# Patient Record
Sex: FEMALE | Race: WHITE | NOT HISPANIC OR LATINO | Employment: FULL TIME | URBAN - METROPOLITAN AREA
[De-identification: names, ages, dates, MRNs, and addresses within clinical notes are randomized per-mention and may not be internally consistent; named-entity substitution may affect disease eponyms.]

---

## 2017-07-18 ENCOUNTER — OFFICE VISIT - HEALTHEAST (OUTPATIENT)
Dept: FAMILY MEDICINE | Facility: CLINIC | Age: 37
End: 2017-07-18

## 2017-07-18 DIAGNOSIS — N20.1 URETERAL STONE: ICD-10-CM

## 2017-07-18 ASSESSMENT — MIFFLIN-ST. JEOR: SCORE: 1534.04

## 2017-08-09 ENCOUNTER — RECORDS - HEALTHEAST (OUTPATIENT)
Dept: ADMINISTRATIVE | Facility: OTHER | Age: 37
End: 2017-08-09

## 2017-09-05 ENCOUNTER — RECORDS - HEALTHEAST (OUTPATIENT)
Dept: ADMINISTRATIVE | Facility: OTHER | Age: 37
End: 2017-09-05

## 2017-11-06 ENCOUNTER — RECORDS - HEALTHEAST (OUTPATIENT)
Dept: ADMINISTRATIVE | Facility: OTHER | Age: 37
End: 2017-11-06

## 2018-10-03 ENCOUNTER — OFFICE VISIT - HEALTHEAST (OUTPATIENT)
Dept: FAMILY MEDICINE | Facility: CLINIC | Age: 38
End: 2018-10-03

## 2018-10-03 DIAGNOSIS — Z00.00 ROUTINE MEDICAL EXAM: ICD-10-CM

## 2018-10-03 LAB
HCG UR QL: NEGATIVE
SP GR UR STRIP: 1.01 (ref 1–1.03)

## 2018-10-03 ASSESSMENT — MIFFLIN-ST. JEOR: SCORE: 1522.71

## 2018-10-04 LAB
HPV SOURCE: NORMAL
HUMAN PAPILLOMA VIRUS 16 DNA: NEGATIVE
HUMAN PAPILLOMA VIRUS 18 DNA: NEGATIVE
HUMAN PAPILLOMA VIRUS FINAL DIAGNOSIS: NORMAL
HUMAN PAPILLOMA VIRUS OTHER HR: NEGATIVE
SPECIMEN DESCRIPTION: NORMAL

## 2018-10-11 LAB
BKR LAB AP ABNORMAL BLEEDING: YES
BKR LAB AP BIRTH CONTROL/HORMONES: NORMAL
BKR LAB AP CERVICAL APPEARANCE: NORMAL
BKR LAB AP GYN ADEQUACY: NORMAL
BKR LAB AP GYN INTERPRETATION: NORMAL
BKR LAB AP HPV REFLEX: NORMAL
BKR LAB AP LMP: NORMAL
BKR LAB AP PATIENT STATUS: NORMAL
BKR LAB AP PREVIOUS ABNORMAL: NORMAL
BKR LAB AP PREVIOUS NORMAL: NORMAL
HIGH RISK?: YES
PATH REPORT.COMMENTS IMP SPEC: NORMAL
RESULT FLAG (HE HISTORICAL CONVERSION): NORMAL

## 2020-10-13 ENCOUNTER — OFFICE VISIT (OUTPATIENT)
Dept: INTERNAL MEDICINE | Facility: CLINIC | Age: 40
End: 2020-10-13
Payer: COMMERCIAL

## 2020-10-13 ENCOUNTER — ANCILLARY PROCEDURE (OUTPATIENT)
Dept: GENERAL RADIOLOGY | Facility: CLINIC | Age: 40
End: 2020-10-13
Attending: NURSE PRACTITIONER
Payer: COMMERCIAL

## 2020-10-13 VITALS
OXYGEN SATURATION: 98 % | SYSTOLIC BLOOD PRESSURE: 130 MMHG | DIASTOLIC BLOOD PRESSURE: 80 MMHG | WEIGHT: 203.9 LBS | BODY MASS INDEX: 29.68 KG/M2 | TEMPERATURE: 98.4 F | HEART RATE: 101 BPM

## 2020-10-13 DIAGNOSIS — G89.29 CHRONIC RIGHT SHOULDER PAIN: Primary | ICD-10-CM

## 2020-10-13 DIAGNOSIS — G89.29 CHRONIC RIGHT SHOULDER PAIN: ICD-10-CM

## 2020-10-13 DIAGNOSIS — M25.511 CHRONIC RIGHT SHOULDER PAIN: Primary | ICD-10-CM

## 2020-10-13 DIAGNOSIS — K21.9 GASTROESOPHAGEAL REFLUX DISEASE WITHOUT ESOPHAGITIS: ICD-10-CM

## 2020-10-13 DIAGNOSIS — M25.511 CHRONIC RIGHT SHOULDER PAIN: ICD-10-CM

## 2020-10-13 PROCEDURE — 73030 X-RAY EXAM OF SHOULDER: CPT | Mod: RT | Performed by: RADIOLOGY

## 2020-10-13 PROCEDURE — 99204 OFFICE O/P NEW MOD 45 MIN: CPT | Performed by: NURSE PRACTITIONER

## 2020-10-13 RX ORDER — CYCLOBENZAPRINE HCL 10 MG
10 TABLET ORAL 2 TIMES DAILY PRN
Qty: 30 TABLET | Refills: 0 | Status: SHIPPED | OUTPATIENT
Start: 2020-10-13 | End: 2022-02-07

## 2020-10-13 RX ORDER — LANSOPRAZOLE 30 MG/1
30 CAPSULE, DELAYED RELEASE ORAL DAILY
Qty: 30 CAPSULE | Refills: 1 | Status: SHIPPED | OUTPATIENT
Start: 2020-10-13 | End: 2020-12-21

## 2020-10-13 NOTE — PROGRESS NOTES
Subjective     Anahi Paul is a 40 year old female who presents to clinic today for the following health issues:    HPI         Musculoskeletal problem/pain  Onset/Duration: Started six months ago on and off, it has gotten worse the last month or two.  Description  Location: Shoulder - right  Joint Swelling: no  Redness: no  Pain: YES  Warmth: no  Intensity:  mild, 3/10 when sitting   Progression of Symptoms:  same and depend on the day  Accompanying signs and symptoms:   Fevers: no  Numbness/tingling/weakness: YES  History  Trauma to the area: YES  Recent illness:  YES  Previous similar problem: YES  Previous evaluation:  no  Precipitating or alleviating factors:  Aggravating factors include: lifting, exercise, walking, and overuse  Therapies tried and outcome: heat, ice, immobilization and support wrap, no improvement      See above.  New patient to the clinic and provider. Reviewed PMH, medications, etc.    Biggest concern is right shoulder pain for 6 months and not getting any better.  No recent fall or injury. Taking Ibuprofen from time to time without relief.  Difficulty brushing hair and putting on bra. Unable to sleep on that shoulder.  No x-ray or PT in past.  Asking for Rx for something different than OTC Prilosec (it does not work well).    Review of Systems   Constitutional, HEENT, cardiovascular, pulmonary, gi and gu systems are negative, except as otherwise noted.      Objective    /80 (BP Location: Right arm, Patient Position: Sitting, Cuff Size: Adult Large)   Pulse 101   Temp 98.4  F (36.9  C) (Oral)   Wt 92.5 kg (203 lb 14.4 oz)   LMP 10/08/2020 (Exact Date)   SpO2 98%   BMI 29.68 kg/m    Body mass index is 29.68 kg/m .     Physical Exam   GENERAL: alert and no distress  RESP: lungs clear to auscultation - no rales, rhonchi or wheezes  CV: regular rate and rhythm, normal S1 S2, no S3 or S4, no murmur, click or rub, no peripheral edema and peripheral pulses strong  ABDOMEN: soft,  nontender, no hepatosplenomegaly, no masses and bowel sounds normal  MS/Shoulder: left shoulder with good ROM while right has very limited ROM in all rotator cuff muscles; no AC shoulder joint tenderness but mostly tenderness along Trapezius muscle and down mid back  SKIN: no suspicious lesions or rashes        Assessment & Plan     Chronic right shoulder pain  - XR Shoulder Right 2 Views; Future  - cyclobenzaprine (FLEXERIL) 10 MG tablet; Take 1 tablet (10 mg) by mouth 2 times daily as needed for muscle spasms; take mostly at night to help with muscle pain and sleep  - PHYSICAL THERAPY REFERRAL; Future  - if no resolve, will consider MRI and referral to Orthopedics    Gastroesophageal reflux disease without esophagitis  - in place of Omeprazole (does not work) will trial):    LANsoprazole (PREVACID) 30 MG DR capsule; Take 1 capsule (30 mg) by mouth daily        Patient Instructions   Go to radiology for shoulder x-ray    Will treat shoulder muscle pain with Flexeril 10 mg q hs as needed for sleep and pain    Suggested over the counter Salonpas patch and/or thera gel topical analgesics.    Refill on Prevacid 30 mg daily    Referral to PT    Follow up for physical in 1-2 months and for fasting labs.    Declines flu shot at this time.    Return in about 6 weeks (around 11/24/2020) for Routine preventive, with me, in person; with fasting labs.    WILMER Wilson [facility]

## 2020-10-13 NOTE — PATIENT INSTRUCTIONS
Go to radiology for shoulder x-ray    Will treat shoulder muscle pain with Flexeril 10 mg q hs as needed for sleep and pain    Suggested over the counter Salonpas patch and/or thera gel topical analgesics.    Refill on Prevacid 30 mg daily    Referral to PT    Follow up for physical in 1-2 months and for fasting labs.

## 2020-10-14 ENCOUNTER — NURSE TRIAGE (OUTPATIENT)
Dept: NURSING | Facility: CLINIC | Age: 40
End: 2020-10-14

## 2020-10-15 NOTE — TELEPHONE ENCOUNTER
Pt calling in saying she missed a call from ? Clinic    Wants to know if I can see her shoulder x-ray results    Looking in chart I see >  XR SHOULDER 2 VIEW RIGHT  10/13/2020 3:50 PM     IMPRESSION: No acute fracture or malalignment. There is normal  glenohumeral joint spacing. Mild acromioclavicular joint degenerative  Changes.    The above information given to pt   As well advised to call clinic when open tomorrow and discuss    Protocol and care advice reviewed  Caller states understanding of the recommended disposition  Advised to call back if further questions or concerns    Taco Virgen , RN / [facility]    Reason for Disposition    Caller requesting lab results    Additional Information    Lab result questions    Protocols used: PCP CALL - NO TRIAGE-A-AH, INFORMATION ONLY CALL-A-AH

## 2020-11-07 ENCOUNTER — THERAPY VISIT (OUTPATIENT)
Dept: PHYSICAL THERAPY | Facility: CLINIC | Age: 40
End: 2020-11-07
Attending: NURSE PRACTITIONER
Payer: COMMERCIAL

## 2020-11-07 DIAGNOSIS — M25.511 CHRONIC RIGHT SHOULDER PAIN: ICD-10-CM

## 2020-11-07 DIAGNOSIS — G89.29 CHRONIC RIGHT SHOULDER PAIN: ICD-10-CM

## 2020-11-07 PROCEDURE — 97110 THERAPEUTIC EXERCISES: CPT | Mod: GP | Performed by: PHYSICAL THERAPIST

## 2020-11-07 PROCEDURE — 97161 PT EVAL LOW COMPLEX 20 MIN: CPT | Mod: GP | Performed by: PHYSICAL THERAPIST

## 2020-11-07 NOTE — PROGRESS NOTES
[facility] Initial Evaluation  Subjective:  The history is provided by the patient. No  was used.   Patient Health History  Anahi Paul being seen for PT for shoulder.     Problem began: 7/1/2020.   Problem occurred: Unknown   Pain is reported as 3/10 and 4/10 on pain scale.  General health as reported by patient is good.  Pertinent medical history includes: none.     Medical allergies: other. Other medical allergies details: See med list.   Surgeries include:  Other. Other surgery history details: Oral, congenital hip, cryro,.    Current medications:  Muscle relaxants and pain medication.    Current occupation is Student, , and lead.   Primary job tasks include:  Computer work, lifting/carrying, prolonged standing, pushing/pulling and repetitive tasks.                  Therapist Generated HPI Evaluation         Type of problem:  Right shoulder.    This is a chronic condition.        Pain is described as aching and sharp and is intermittent.  Pain is worse in the P.M..  Since onset symptoms are unchanged.  Associated symptoms:  Loss of motion/stiffness and loss of strength. Symptoms are exacerbated by lifting, carrying, using arm at shoulder level, using arm behind back, lying on extremity and using arm overhead (dressing upper body)  and relieved by ice, rest and analgesics.  Special tests included:  X-ray.  Previous treatment includes chiropractic (7-8 years ago). There was significant improvement following previous treatment.  Restrictions due to condition include:  Working in normal job without restrictions.      Therapist Generated HPI Evaluation         Type of problem:  Right shoulder.    This is a chronic condition.  Condition occurred with:  Unknown cause.  Where condition occurred: for unknown reasons.  Patient reports pain:  Anterior and posterior.  Pain is described as sharp and is intermittent.  Radiates to: biceps, pectoralis muscle. Pain is  worse in the P.M..  Since onset symptoms are gradually worsening.  Associated symptoms:  Loss of motion/stiffness and loss of strength. Symptoms are exacerbated by carrying, lifting, lying on extremity, using arm behind back, using arm at shoulder level and using arm overhead (dressing)  and relieved by rest, ice and analgesics.  Special tests included:  X-ray (see report).  Previous treatment includes chiropractic (right shoulder 7 years ago). There was significant improvement following previous treatment.  Restrictions due to condition include:  Working in normal job without restrictions.  Barriers include:  None as reported by patient.                        Objective:  Standing Alignment:      Shoulder/upper extremity deviations alignment: forward shoulder posture.                                       Shoulder Evaluation:  ROM:  AROM:    Flexion:  Right:  90  Extension: Right: 40  Abduction:  Right:  98      External Rotation:  Right:  60                PROM:    Flexion:  Right: 102    Extension:  Right:  50  Abduction:  Right:  120    Internal Rotation:  Right:  70  External Rotation:  Right:  70                    Strength:    Flexion: Right: 4/5  Weak/painful     Pain:   Extension:  Right: 5/5    Pain:  Abduction:  Right: 4/5   Weak/painful    Pain:  Adduction:  Right: 5/5     Pain:  Internal Rotation:  Right: 5/5     Pain:  External Rotation:   Right:4/5   Weak/painful    Pain:              Special Tests:      Right shoulder positive for the following special tests:Impingement  Palpation:      Right shoulder tenderness present at: Biceps and Supraspinatus                                     General     ROS    Assessment/Plan:    Patient is a 40 year old female with right side shoulder complaints.    Patient has the following significant findings with corresponding treatment plan.                Diagnosis 1:  Right rotator cuff impingement  Pain -  hot/cold therapy, manual therapy, self management, education  and home program  Decreased ROM/flexibility - manual therapy, therapeutic exercise, therapeutic activity and home program  Decreased strength - therapeutic exercise, therapeutic activities and home program    Therapy Evaluation Codes:   1) History comprised of:   Personal factors that impact the plan of care:      None.    Comorbidity factors that impact the plan of care are:      Migraines/headaches, Osteoarthritis and Weakness.     Medications impacting care: Muscle relaxant and Pain.  2) Examination of Body Systems comprised of:   Body structures and functions that impact the plan of care:      Shoulder.   Activity limitations that impact the plan of care are:      Bathing, Dressing, Lifting, Sleeping and reaching.  3) Clinical presentation characteristics are:   Stable/Uncomplicated.  4) Decision-Making    Low complexity using standardized patient assessment instrument and/or measureable assessment of functional outcome.  Cumulative Therapy Evaluation is: Low complexity.    Previous and current functional limitations:  (See Goal Flow Sheet for this information)    Short term and Long term goals: (See Goal Flow Sheet for this information)     Communication ability:  Patient appears to be able to clearly communicate and understand verbal and written communication and follow directions correctly.  Treatment Explanation - The following has been discussed with the patient:   RX ordered/plan of care  Anticipated outcomes  Possible risks and side effects  This patient would benefit from PT intervention to resume normal activities.   Rehab potential is good.    Frequency:  1 X week, once daily  Duration:  for 6 weeks  Discharge Plan:  Achieve all LTG.  Independent in home treatment program.  Reach maximal therapeutic benefit.    Please refer to the daily flowsheet for treatment today, total treatment time and time spent performing 1:1 timed codes.

## 2020-11-14 ENCOUNTER — THERAPY VISIT (OUTPATIENT)
Dept: PHYSICAL THERAPY | Facility: CLINIC | Age: 40
End: 2020-11-14
Payer: COMMERCIAL

## 2020-11-14 DIAGNOSIS — G89.29 CHRONIC RIGHT SHOULDER PAIN: ICD-10-CM

## 2020-11-14 DIAGNOSIS — M25.511 CHRONIC RIGHT SHOULDER PAIN: ICD-10-CM

## 2020-11-14 PROCEDURE — 97010 HOT OR COLD PACKS THERAPY: CPT | Mod: GP | Performed by: PHYSICAL THERAPIST

## 2020-11-14 PROCEDURE — 97110 THERAPEUTIC EXERCISES: CPT | Mod: GP | Performed by: PHYSICAL THERAPIST

## 2020-11-21 ENCOUNTER — THERAPY VISIT (OUTPATIENT)
Dept: PHYSICAL THERAPY | Facility: CLINIC | Age: 40
End: 2020-11-21
Payer: COMMERCIAL

## 2020-11-21 DIAGNOSIS — G89.29 CHRONIC RIGHT SHOULDER PAIN: ICD-10-CM

## 2020-11-21 DIAGNOSIS — M25.511 CHRONIC RIGHT SHOULDER PAIN: ICD-10-CM

## 2020-11-21 PROCEDURE — 97140 MANUAL THERAPY 1/> REGIONS: CPT | Mod: GP | Performed by: PHYSICAL THERAPIST

## 2020-11-21 PROCEDURE — 97110 THERAPEUTIC EXERCISES: CPT | Mod: GP | Performed by: PHYSICAL THERAPIST

## 2020-12-04 ENCOUNTER — THERAPY VISIT (OUTPATIENT)
Dept: PHYSICAL THERAPY | Facility: CLINIC | Age: 40
End: 2020-12-04
Payer: COMMERCIAL

## 2020-12-04 DIAGNOSIS — G89.29 CHRONIC RIGHT SHOULDER PAIN: ICD-10-CM

## 2020-12-04 DIAGNOSIS — M25.511 CHRONIC RIGHT SHOULDER PAIN: ICD-10-CM

## 2020-12-04 PROCEDURE — 97112 NEUROMUSCULAR REEDUCATION: CPT | Mod: GP | Performed by: PHYSICAL THERAPY ASSISTANT

## 2020-12-04 PROCEDURE — 97140 MANUAL THERAPY 1/> REGIONS: CPT | Mod: GP | Performed by: PHYSICAL THERAPY ASSISTANT

## 2020-12-04 PROCEDURE — 97110 THERAPEUTIC EXERCISES: CPT | Mod: GP | Performed by: PHYSICAL THERAPY ASSISTANT

## 2020-12-11 ENCOUNTER — THERAPY VISIT (OUTPATIENT)
Dept: PHYSICAL THERAPY | Facility: CLINIC | Age: 40
End: 2020-12-11
Payer: COMMERCIAL

## 2020-12-11 DIAGNOSIS — G89.29 CHRONIC RIGHT SHOULDER PAIN: ICD-10-CM

## 2020-12-11 DIAGNOSIS — M25.511 CHRONIC RIGHT SHOULDER PAIN: ICD-10-CM

## 2020-12-11 PROCEDURE — 97110 THERAPEUTIC EXERCISES: CPT | Mod: GP | Performed by: PHYSICAL THERAPY ASSISTANT

## 2020-12-11 PROCEDURE — 97140 MANUAL THERAPY 1/> REGIONS: CPT | Mod: GP | Performed by: PHYSICAL THERAPY ASSISTANT

## 2020-12-17 ENCOUNTER — THERAPY VISIT (OUTPATIENT)
Dept: PHYSICAL THERAPY | Facility: CLINIC | Age: 40
End: 2020-12-17
Payer: COMMERCIAL

## 2020-12-17 DIAGNOSIS — G89.29 CHRONIC RIGHT SHOULDER PAIN: ICD-10-CM

## 2020-12-17 DIAGNOSIS — M25.511 CHRONIC RIGHT SHOULDER PAIN: ICD-10-CM

## 2020-12-17 PROCEDURE — 97110 THERAPEUTIC EXERCISES: CPT | Mod: GP | Performed by: PHYSICAL THERAPY ASSISTANT

## 2020-12-17 PROCEDURE — 97112 NEUROMUSCULAR REEDUCATION: CPT | Mod: GP | Performed by: PHYSICAL THERAPY ASSISTANT

## 2020-12-18 DIAGNOSIS — K21.9 GASTROESOPHAGEAL REFLUX DISEASE WITHOUT ESOPHAGITIS: ICD-10-CM

## 2020-12-20 ENCOUNTER — HEALTH MAINTENANCE LETTER (OUTPATIENT)
Age: 40
End: 2020-12-20

## 2020-12-21 RX ORDER — LANSOPRAZOLE 30 MG/1
30 CAPSULE, DELAYED RELEASE ORAL DAILY
Qty: 30 CAPSULE | Refills: 3 | Status: SHIPPED | OUTPATIENT
Start: 2020-12-21 | End: 2021-03-29

## 2020-12-31 ENCOUNTER — THERAPY VISIT (OUTPATIENT)
Dept: PHYSICAL THERAPY | Facility: CLINIC | Age: 40
End: 2020-12-31
Payer: COMMERCIAL

## 2020-12-31 DIAGNOSIS — M25.511 CHRONIC RIGHT SHOULDER PAIN: ICD-10-CM

## 2020-12-31 DIAGNOSIS — G89.29 CHRONIC RIGHT SHOULDER PAIN: ICD-10-CM

## 2020-12-31 PROCEDURE — 97112 NEUROMUSCULAR REEDUCATION: CPT | Mod: GP | Performed by: PHYSICAL THERAPY ASSISTANT

## 2020-12-31 PROCEDURE — 97110 THERAPEUTIC EXERCISES: CPT | Mod: GP | Performed by: PHYSICAL THERAPY ASSISTANT

## 2020-12-31 NOTE — PROGRESS NOTES
Subjective:  HPI  Physical Exam                    Objective:  System                   Shoulder Evaluation:  ROM:  AROM:    Flexion:  Right:  154    Abduction:  Right:  155                Flexion/External Rotation:  Right:  T4  Extension/Internal Rotation:  Right:  T7          Strength:    Flexion: Right: 5-/5     Pain:     Abduction:  Right: 5-/5     Pain:    Internal Rotation:  Right: 5-/5     Pain:  External Rotation:   Right:4+/5     Pain:                                                     General     ROS    Assessment/Plan:    DISCHARGE REPORT    Progress reporting period is from 11/7/20 to 12/31/20.      SUBJECTIVE  Subjective changes noted by patient:   Random pain on occasion in the right shoulder.  The pain is intermittent and is 3-4/10 and lasts for 1-4 mins. This happens 4-5 times a day.   Current pain level is  0/10    Previous pain level was:   Initial Pain level: 5/10   Changes in function:  Yes (See Goal flowsheet attached for changes in current functional level)     Adverse reaction to treatment or activity: None     OBJECTIVE  Changes noted in objective findings:  Yes, patient has a good HEP and reviewed and perform.  Patient needs to continue to progress with her strengthening exercises and correct posture.  Patient was instructed to continue her HEP for 3 more months.

## 2021-01-18 ENCOUNTER — OFFICE VISIT (OUTPATIENT)
Dept: INTERNAL MEDICINE | Facility: CLINIC | Age: 41
End: 2021-01-18
Payer: COMMERCIAL

## 2021-01-18 VITALS
HEIGHT: 70 IN | SYSTOLIC BLOOD PRESSURE: 110 MMHG | BODY MASS INDEX: 29.35 KG/M2 | OXYGEN SATURATION: 98 % | HEART RATE: 90 BPM | RESPIRATION RATE: 20 BRPM | DIASTOLIC BLOOD PRESSURE: 72 MMHG | WEIGHT: 205 LBS | TEMPERATURE: 98.4 F

## 2021-01-18 DIAGNOSIS — Z12.31 ENCOUNTER FOR SCREENING MAMMOGRAM FOR BREAST CANCER: Primary | ICD-10-CM

## 2021-01-18 DIAGNOSIS — Z11.59 ENCOUNTER FOR HEPATITIS C SCREENING TEST FOR LOW RISK PATIENT: ICD-10-CM

## 2021-01-18 DIAGNOSIS — Z13.6 ENCOUNTER FOR SCREENING FOR CARDIOVASCULAR DISORDERS: ICD-10-CM

## 2021-01-18 DIAGNOSIS — Z00.00 ROUTINE HISTORY AND PHYSICAL EXAMINATION OF ADULT: ICD-10-CM

## 2021-01-18 DIAGNOSIS — Z83.3 FAMILY HISTORY OF DIABETES MELLITUS: ICD-10-CM

## 2021-01-18 LAB
ALBUMIN SERPL-MCNC: 3.4 G/DL (ref 3.4–5)
ALP SERPL-CCNC: 56 U/L (ref 40–150)
ALT SERPL W P-5'-P-CCNC: 19 U/L (ref 0–50)
ANION GAP SERPL CALCULATED.3IONS-SCNC: 3 MMOL/L (ref 3–14)
AST SERPL W P-5'-P-CCNC: 16 U/L (ref 0–45)
BILIRUB SERPL-MCNC: 0.3 MG/DL (ref 0.2–1.3)
BUN SERPL-MCNC: 10 MG/DL (ref 7–30)
CALCIUM SERPL-MCNC: 9.1 MG/DL (ref 8.5–10.1)
CHLORIDE SERPL-SCNC: 107 MMOL/L (ref 94–109)
CHOLEST SERPL-MCNC: 168 MG/DL
CO2 SERPL-SCNC: 27 MMOL/L (ref 20–32)
CREAT SERPL-MCNC: 0.68 MG/DL (ref 0.52–1.04)
GFR SERPL CREATININE-BSD FRML MDRD: >90 ML/MIN/{1.73_M2}
GLUCOSE SERPL-MCNC: 95 MG/DL (ref 70–99)
HDLC SERPL-MCNC: 54 MG/DL
LDLC SERPL CALC-MCNC: 87 MG/DL
NONHDLC SERPL-MCNC: 114 MG/DL
POTASSIUM SERPL-SCNC: 4.4 MMOL/L (ref 3.4–5.3)
PROT SERPL-MCNC: 7.6 G/DL (ref 6.8–8.8)
SODIUM SERPL-SCNC: 137 MMOL/L (ref 133–144)
TRIGL SERPL-MCNC: 134 MG/DL

## 2021-01-18 PROCEDURE — 99396 PREV VISIT EST AGE 40-64: CPT | Performed by: NURSE PRACTITIONER

## 2021-01-18 PROCEDURE — 80061 LIPID PANEL: CPT | Performed by: NURSE PRACTITIONER

## 2021-01-18 PROCEDURE — 80053 COMPREHEN METABOLIC PANEL: CPT | Performed by: NURSE PRACTITIONER

## 2021-01-18 PROCEDURE — 36415 COLL VENOUS BLD VENIPUNCTURE: CPT | Performed by: NURSE PRACTITIONER

## 2021-01-18 PROCEDURE — 86803 HEPATITIS C AB TEST: CPT | Performed by: NURSE PRACTITIONER

## 2021-01-18 ASSESSMENT — ENCOUNTER SYMPTOMS
CONSTIPATION: 0
NAUSEA: 0
CHILLS: 0
FREQUENCY: 0
SHORTNESS OF BREATH: 0
NERVOUS/ANXIOUS: 0
DIZZINESS: 0
HEADACHES: 1
DYSURIA: 0
DIARRHEA: 0
ARTHRALGIAS: 0
HEMATOCHEZIA: 0
JOINT SWELLING: 0
PALPITATIONS: 0
EYE PAIN: 0
COUGH: 0
MYALGIAS: 0
WEAKNESS: 0
ABDOMINAL PAIN: 0
HEMATURIA: 0
SORE THROAT: 0
PARESTHESIAS: 1
HEARTBURN: 0
FEVER: 0

## 2021-01-18 ASSESSMENT — MIFFLIN-ST. JEOR: SCORE: 1662.18
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
## 2021-01-18 NOTE — NURSING NOTE
"Chief Complaint   Patient presents with     Physical     fasting     initial /72   Pulse 90   Temp 98.4  F (36.9  C) (Oral)   Resp 20   Ht 1.765 m (5' 9.5\")   Wt 93 kg (205 lb)   LMP 12/25/2020 (Exact Date)   SpO2 98%   Breastfeeding No   BMI 29.84 kg/m   Estimated body mass index is 29.84 kg/m  as calculated from the following:    Height as of this encounter: 1.765 m (5' 9.5\").    Weight as of this encounter: 93 kg (205 lb)..  bp completed using cuff size large  BRUNO CLEVELAND LPN  "

## 2021-01-18 NOTE — PATIENT INSTRUCTIONS
Go to [address] for labs    Referral for mammogram    After at least 2 weeks of Covid shot, make a nurse visit to get the flu shot

## 2021-01-19 LAB — HCV AB SERPL QL IA: NONREACTIVE

## 2021-02-05 PROBLEM — G89.29 CHRONIC RIGHT SHOULDER PAIN: Status: RESOLVED | Noted: 2020-11-07 | Resolved: 2021-02-05

## 2021-02-05 PROBLEM — M25.511 CHRONIC RIGHT SHOULDER PAIN: Status: RESOLVED | Noted: 2020-11-07 | Resolved: 2021-02-05

## 2021-02-05 NOTE — PROGRESS NOTES
Subjective:  HPI  Physical Exam                    Objective:  System    Physical Exam    General     ROS    Assessment/Plan:    ASSESSMENT/PLAN  Updated problem list and treatment plan: Diagnosis 1:  Right shoulder pain  Pain -  hot/cold therapy, self management, education and home program  Decreased ROM/flexibility - therapeutic exercise, therapeutic activity and home program  Decreased strength - therapeutic exercise, therapeutic activities and home program  Progress toward STG/LTGs have been made:  Yes,   Assessment of Progress: The patient's condition is improving.  Self Management Plans:  Patient has been instructed in a home treatment program.  I have re-evaluated this patient and find that the nature, scope, duration and intensity of the therapy is appropriate for the medical condition of the patient.  Anahi continues to require the following intervention to meet STG and LT's:  PT intervention is no longer required to meet STG/LTG.    Recommendations:  This patient is ready to be discharged from therapy and continue their home treatment program.    Please refer to the daily flowsheet for treatment today, total treatment time and time spent performing 1:1 timed codes.

## 2021-02-08 ENCOUNTER — HOSPITAL ENCOUNTER (OUTPATIENT)
Dept: MAMMOGRAPHY | Facility: CLINIC | Age: 41
Discharge: HOME | End: 2021-02-08
Attending: NURSE PRACTITIONER | Admitting: NURSE PRACTITIONER
Payer: COMMERCIAL

## 2021-02-08 DIAGNOSIS — Z12.31 ENCOUNTER FOR SCREENING MAMMOGRAM FOR BREAST CANCER: ICD-10-CM

## 2021-02-08 PROCEDURE — 77063 BREAST TOMOSYNTHESIS BI: CPT

## 2021-03-27 DIAGNOSIS — K21.9 GASTROESOPHAGEAL REFLUX DISEASE WITHOUT ESOPHAGITIS: ICD-10-CM

## 2021-03-29 RX ORDER — LANSOPRAZOLE 30 MG/1
CAPSULE, DELAYED RELEASE ORAL
Qty: 90 CAPSULE | Refills: 2 | Status: SHIPPED | OUTPATIENT
Start: 2021-03-29 | End: 2022-01-10

## 2021-03-29 NOTE — TELEPHONE ENCOUNTER
Pending Prescriptions:                       Disp   Refills    LANsoprazole (PREVACID) 30 MG DR capsule *90 cap*2            Sig: TAKE 1 CAPSULE BY MOUTH EVERY DAY    Prescription approved per [facility] Refill Protocol.

## 2021-05-31 VITALS — WEIGHT: 175 LBS | BODY MASS INDEX: 25.05 KG/M2 | HEIGHT: 70 IN

## 2021-06-02 VITALS — HEIGHT: 69 IN | BODY MASS INDEX: 26.07 KG/M2 | WEIGHT: 176 LBS

## 2021-06-11 NOTE — PROGRESS NOTES
Assessment/Plan:       1. Ureteral stone  Plan follow-up with urology as already scheduled.  UA below does not show any changes when compared to previous urinalysis done in the hospital.  Continues have a trace amount of blood however there was large amount of blood at her last UA.  I did give her a refill of her Flomax for 1 more week and anticipate that she will pass the stone within this week.  I encouraged her to continue to strain her urine and it keep the stone for sampling.  Pain management discussed the patient as well as continued encouragement to increase her water intake and eating a balanced diet.  Plan follow-up if symptoms are not improving or worsening significantly in the next several weeks otherwise follow-up as needed.  - tamsulosin (FLOMAX) 0.4 mg Cp24; Take 1 capsule (0.4 mg total) by mouth daily.  Dispense: 7 capsule; Refill: 0  - Urinalysis-UC if Indicated        Subjective:      Anahi Paul is a 36 y.o. female who presents for hospital follow up for kidney stone.  She was admitted overnight to [facility] for evaluation of significant CVA tenderness and positive ureteral kidney stone on the left side.  She was given fluid boluses as well as started on IV antibiotics to treat a possible infection.  Upon discharge she was given Flomax to take on a daily basis to help assist with passing the kidney stone.  She is straining her urine with each void however has not seen a kidney stone passed yet.  She has a follow-up appoint with urology scheduled for 8/2/17.  She reports that overall her pain management has been going well.  She has not utilized the Percocet that was prescribed.  She has been utilizing Tylenol and ibuprofen as needed as well as drinking plenty of water.  She will occasionally have sharp stabbing pains in her left low back however these quickly resolved.  She denies any specific chest pain, shortness of breath, heart palpitations, nausea, vomiting, or diarrhea.  She does  report that she has a significant history for constipation which she will utilize stool softeners for.  She does find that she is more constipated now since having the kidney stone than she typically is.  She denies any rectal bleeding or hemorrhoids at this time.      CT scan results as below.  IMPRESSION:   CONCLUSION:  1.  There is a punctate left ureteral calculus at the level of the midportion of L3 resulting in moderate hydronephrosis and mild perinephric stranding. That is the extent of the stone burden.    The following portions of the patient's history were reviewed and updated as appropriate: allergies, current medications, past family history, past medical history, past social history, past surgical history and problem list.    History reviewed. No pertinent past medical history.  Past Surgical History:   Procedure Laterality Date     GYNECOLOGIC CRYOSURGERY       HIP SURGERY      Congenital hip dysplasia     WISDOM TOOTH EXTRACTION       Social History     Social History     Marital status: Single     Spouse name: N/A     Number of children: N/A     Years of education: N/A     Occupational History     Not on file.     Social History Main Topics     Smoking status: Never Smoker     Smokeless tobacco: Never Used     Alcohol use No     Drug use: No     Sexual activity: Not Currently     Partners: Female, Male     Other Topics Concern     Not on file     Social History Narrative     Current Outpatient Prescriptions   Medication Sig     biotin 5,000 mcg TbDL Take 1 tablet by mouth daily.     lansoprazole (PREVACID) 15 MG capsule Take 15 mg by mouth daily.     oxyCODONE-acetaminophen (PERCOCET) 5-325 mg per tablet Take 1-2 tablets by mouth every 6 (six) hours as needed for pain.     phenylephrine (SUDAFED PE) 10 MG Tab Take 10 mg by mouth daily as needed.     senna-docusate (PERICOLACE) 8.6-50 mg tablet Take 1 tablet by mouth 2 (two) times a day.     tamsulosin (FLOMAX) 0.4 mg Cp24 Take 1 capsule (0.4 mg  "total) by mouth daily.     Family History   Problem Relation Age of Onset     Diabetes Mother      Hypertension Mother      Hyperlipidemia Mother      Arthritis Father      Depression Maternal Grandmother      Heart disease Maternal Grandmother      Hypertension Maternal Grandmother      Hyperlipidemia Maternal Grandmother      Depression Maternal Grandfather      Heart disease Maternal Grandfather      Hypertension Maternal Grandfather      Hyperlipidemia Maternal Grandfather      Cancer Paternal Grandfather      Breast cancer Neg Hx      Colon cancer Neg Hx      Stroke Neg Hx        Review of Systems   Pertinent items are noted in HPI.      Objective:      /80 (Patient Site: Right Arm, Patient Position: Sitting, Cuff Size: Adult Regular)  Pulse 80  Resp 16  Ht 5' 10\" (1.778 m)  Wt 175 lb (79.4 kg)  BMI 25.11 kg/m2    General appearance: alert, appears stated age and cooperative  Head: Normocephalic, without obvious abnormality, atraumatic  Back: Mild left-sided CVA tenderness is present.   Lungs: clear to auscultation bilaterally  Heart: regular rate and rhythm, S1, S2 normal, no murmur, click, rub or gallop  Neurologic: Grossly normal     Results for orders placed or performed in visit on 07/18/17   Urinalysis-UC if Indicated   Result Value Ref Range    Color, UA Yellow Colorless, Yellow, Straw, Light Yellow    Clarity, UA Clear Clear    Glucose, UA Negative Negative    Bilirubin, UA Negative Negative    Ketones, UA Negative Negative    Specific Gravity, UA 1.010 1.005 - 1.030    Blood, UA Trace (!) Negative    pH, UA 7.0 5.0 - 8.0    Protein, UA Negative Negative mg/dL    Urobilinogen, UA 0.2 E.U./dL 0.2 E.U./dL, 1.0 E.U./dL    Nitrite, UA Negative Negative    Leukocytes, UA Negative Negative    Bacteria, UA Few (!) None Seen hpf    RBC, UA 0-2 None Seen, 0-2 hpf    WBC, UA 0-5 None Seen, 0-5 hpf    Squam Epithel, UA 0-5 None Seen, 0-5 lpf       "

## 2021-06-20 NOTE — PROGRESS NOTES
Assessment:     1. Routine medical exam  Gynecologic Cytology (PAP Smear)    Pregnancy (Beta-hCG, Qual), Urine        Plan:      I recommended she eat a healthy diet and exercise on a regular basis.  We discussed weight loss goals given her family history of type 2 diabetes in her mother.  She is going to work on more regular exercise.  Pap smear and HPV testing obtained today.  We discussed occasional missed menses.  UPT today.  She will return for follow-up and further testing if she continues to miss 2 more menses despite negative UPT's.      Subjective:     Anahi Paul is a 37 y.o. female who presents for an annual exam.  Reports to be otherwise healthy without any chronic medical conditions.  She has had no change in her past medical history or family history.  Her one question today as she is recently missed one menses.  She took a pregnancy test at home in early September and it was negative.  She is sexually active with one new male partner and states it would be okay if she became pregnant.  She denies any other pregnancy symptoms of nausea or breast tenderness.  She reports previously she always had regular periods and denies any previous missed periods.  She has had approximately 5-15 pounds of weight gain over the past couple of years since her foot injury but reports she is trying to get back into regular exercise and healthy diet.  She is declining any contraception today.  She also reports she had an abnormal Pap smear showing precancerous cells of the proximal me 5-6 years ago requiring cryotherapy.  She had multiple normal Pap smears in the interim most recently 2 years ago.    The patient reports that there is not domestic violence in her life.     Healthy Habits:   Regular Exercise: Yes  Sunscreen Use: Yes  Healthy Diet: Yes  Dental Visits Regularly: Yes  Sexually active: Yes  Colonoscopy: N/A  Prevention of Osteoporosis: Yes  Last Dexa: N/A      There is no immunization history on file for this  patient.  Immunization status: up to date and documented.    Gynecologic History  Patient's last menstrual period was 08/08/2018.  Contraception: none  Last Pap: 2 years ago. Results were: normal per pt report      OB History   No data available       Current Outpatient Prescriptions   Medication Sig Dispense Refill     biotin 5,000 mcg TbDL Take 1 tablet by mouth daily.       lansoprazole (PREVACID) 15 MG capsule Take 15 mg by mouth daily.       phenylephrine (SUDAFED PE) 10 MG Tab Take 10 mg by mouth daily as needed.       No current facility-administered medications for this visit.      No past medical history on file.  Past Surgical History:   Procedure Laterality Date     GYNECOLOGIC CRYOSURGERY       HIP SURGERY      Congenital hip dysplasia     WISDOM TOOTH EXTRACTION       Dilaudid [hydromorphone]; Amoxicillin; Bacitracin-polymyxin b; Hydrocortisone; and Other drug allergy (see comments)  Family History   Problem Relation Age of Onset     Diabetes Mother      Hypertension Mother      Hyperlipidemia Mother      Arthritis Father      Depression Maternal Grandmother      Heart disease Maternal Grandmother      Hypertension Maternal Grandmother      Hyperlipidemia Maternal Grandmother      Depression Maternal Grandfather      Heart disease Maternal Grandfather      Hypertension Maternal Grandfather      Hyperlipidemia Maternal Grandfather      Cancer Paternal Grandfather      Breast cancer Neg Hx      Colon cancer Neg Hx      Stroke Neg Hx      Social History     Social History     Marital status: Single     Spouse name: N/A     Number of children: N/A     Years of education: N/A     Occupational History     Not on file.     Social History Main Topics     Smoking status: Never Smoker     Smokeless tobacco: Never Used     Alcohol use No     Drug use: No     Sexual activity: Yes     Partners: Female, Male     Other Topics Concern     Not on file     Social History Narrative       Review of Systems  General:   "Negative except as noted above  Eyes: Negative except as noted above  Ears/Nose/Throat: Negative except as noted above  Cardiovascular: Negative except as noted above  Respiratory:  Negative except as noted above  Gastrointestinal:  Negative except as noted above  Musculoskeletal:  Negative except as noted above  Skin: Negative except as noted above  Neurologic: Negative except as noted above  Psychiatric: Negative except as noted above  Endocrine: Negative except as noted above  Heme/Lymphatic: Negative except as noted above   Allergic/Immunologic: Negative except as noted above      Objective:      /82 (Patient Site: Left Arm, Patient Position: Sitting)  Pulse 72  Resp 16  Ht 5' 9\" (1.753 m)  Wt 176 lb (79.8 kg)  LMP 08/08/2018  BMI 25.99 kg/m2    Physical Exam:  General Appearance: Alert, cooperative, no distress.  Head: Normocephalic, without obvious abnormality, atraumatic  Eyes: PERRL, conjunctiva/corneas clear, EOM's intact  Ears: Normal TM's and external ear canals, both ears  Nose: Nares normal, septum midline,mucosa normal, no drainage  Throat: Lips, mucosa, and tongue normal  Neck: Supple, symmetrical, trachea midline, no adenopathy;  thyroid: not enlarged, symmetric, no tenderness/mass/nodules  Back: Symmetric, no curvature, ROM normal, no CVA tenderness  Lungs: Clear to auscultation bilaterally, respirations unlabored  Breasts: No breast masses, tenderness, asymmetry, or nipple discharge.  Heart: Regular rate and rhythm, S1 and S2 normal, no murmur, rub, or gallop, Abdomen: Soft, non-tender, bowel sounds active all four quadrants,  no masses, no organomegaly  : Normal external genitalia.  Speculum exam reveals a normal cervix with no abnormal discharge seen from cervical os.  Normal appearing vaginal mucosa.  Bimanual exam reveals a freely mobile uterus in the midline without any adnexal masses or tenderness.  Pap smear and HPV testing obtained today.  Extremities: Extremities normal, " atraumatic, no cyanosis or edema  Skin: Skin color, texture, turgor normal, no rashes or lesions  Lymph nodes: Cervical, supraclavicular, and axillary nodes normal  Neurologic: Normal  Psych: Normal affect.  Does not appear anxious or depressed. '    UPT= negative

## 2021-12-19 ENCOUNTER — HOSPITAL ENCOUNTER (EMERGENCY)
Facility: CLINIC | Age: 41
Discharge: HOME | End: 2021-12-19
Attending: EMERGENCY MEDICINE | Admitting: EMERGENCY MEDICINE
Payer: COMMERCIAL

## 2021-12-19 ENCOUNTER — APPOINTMENT (OUTPATIENT)
Dept: CT IMAGING | Facility: CLINIC | Age: 41
End: 2021-12-19
Attending: EMERGENCY MEDICINE
Payer: COMMERCIAL

## 2021-12-19 VITALS
OXYGEN SATURATION: 97 % | RESPIRATION RATE: 16 BRPM | DIASTOLIC BLOOD PRESSURE: 81 MMHG | TEMPERATURE: 98.4 F | WEIGHT: 210 LBS | HEART RATE: 72 BPM | BODY MASS INDEX: 30.57 KG/M2 | SYSTOLIC BLOOD PRESSURE: 115 MMHG

## 2021-12-19 DIAGNOSIS — R07.89 CHEST WALL PAIN: ICD-10-CM

## 2021-12-19 LAB
ANION GAP SERPL CALCULATED.3IONS-SCNC: 8 MMOL/L (ref 3–14)
BASOPHILS # BLD AUTO: 0.1 10E3/UL (ref 0–0.2)
BASOPHILS NFR BLD AUTO: 1 %
BUN SERPL-MCNC: 9 MG/DL (ref 7–30)
CALCIUM SERPL-MCNC: 9.3 MG/DL (ref 8.5–10.1)
CHLORIDE BLD-SCNC: 105 MMOL/L (ref 94–109)
CO2 SERPL-SCNC: 26 MMOL/L (ref 20–32)
CREAT SERPL-MCNC: 0.65 MG/DL (ref 0.52–1.04)
D DIMER PPP FEU-MCNC: 0.51 UG/ML FEU (ref 0–0.5)
EOSINOPHIL # BLD AUTO: 0.3 10E3/UL (ref 0–0.7)
EOSINOPHIL NFR BLD AUTO: 4 %
ERYTHROCYTE [DISTWIDTH] IN BLOOD BY AUTOMATED COUNT: 13.3 % (ref 10–15)
GFR SERPL CREATININE-BSD FRML MDRD: >90 ML/MIN/1.73M2
GLUCOSE BLD-MCNC: 103 MG/DL (ref 70–99)
HCG SERPL QL: NEGATIVE
HCT VFR BLD AUTO: 44.8 % (ref 35–47)
HGB BLD-MCNC: 14.4 G/DL (ref 11.7–15.7)
HOLD SPECIMEN: NORMAL
IMM GRANULOCYTES # BLD: 0 10E3/UL
IMM GRANULOCYTES NFR BLD: 0 %
LYMPHOCYTES # BLD AUTO: 2.9 10E3/UL (ref 0.8–5.3)
LYMPHOCYTES NFR BLD AUTO: 30 %
MCH RBC QN AUTO: 29.3 PG (ref 26.5–33)
MCHC RBC AUTO-ENTMCNC: 32.1 G/DL (ref 31.5–36.5)
MCV RBC AUTO: 91 FL (ref 78–100)
MONOCYTES # BLD AUTO: 0.5 10E3/UL (ref 0–1.3)
MONOCYTES NFR BLD AUTO: 5 %
NEUTROPHILS # BLD AUTO: 5.7 10E3/UL (ref 1.6–8.3)
NEUTROPHILS NFR BLD AUTO: 60 %
NRBC # BLD AUTO: 0 10E3/UL
NRBC BLD AUTO-RTO: 0 /100
PLATELET # BLD AUTO: 360 10E3/UL (ref 150–450)
POTASSIUM BLD-SCNC: 3.7 MMOL/L (ref 3.4–5.3)
RBC # BLD AUTO: 4.92 10E6/UL (ref 3.8–5.2)
SODIUM SERPL-SCNC: 139 MMOL/L (ref 133–144)
TROPONIN I SERPL HS-MCNC: 4 NG/L
WBC # BLD AUTO: 9.6 10E3/UL (ref 4–11)

## 2021-12-19 PROCEDURE — 84703 CHORIONIC GONADOTROPIN ASSAY: CPT | Performed by: EMERGENCY MEDICINE

## 2021-12-19 PROCEDURE — 85025 COMPLETE CBC W/AUTO DIFF WBC: CPT | Performed by: EMERGENCY MEDICINE

## 2021-12-19 PROCEDURE — 99285 EMERGENCY DEPT VISIT HI MDM: CPT | Mod: 25

## 2021-12-19 PROCEDURE — 85379 FIBRIN DEGRADATION QUANT: CPT | Performed by: EMERGENCY MEDICINE

## 2021-12-19 PROCEDURE — 250N000013 HC RX MED GY IP 250 OP 250 PS 637: Performed by: EMERGENCY MEDICINE

## 2021-12-19 PROCEDURE — 84484 ASSAY OF TROPONIN QUANT: CPT | Performed by: EMERGENCY MEDICINE

## 2021-12-19 PROCEDURE — 80048 BASIC METABOLIC PNL TOTAL CA: CPT | Performed by: EMERGENCY MEDICINE

## 2021-12-19 PROCEDURE — 93005 ELECTROCARDIOGRAM TRACING: CPT

## 2021-12-19 PROCEDURE — 250N000011 HC RX IP 250 OP 636: Performed by: EMERGENCY MEDICINE

## 2021-12-19 PROCEDURE — 71275 CT ANGIOGRAPHY CHEST: CPT

## 2021-12-19 PROCEDURE — 36415 COLL VENOUS BLD VENIPUNCTURE: CPT | Performed by: EMERGENCY MEDICINE

## 2021-12-19 RX ORDER — ACETAMINOPHEN 500 MG
1000 TABLET ORAL ONCE
Status: COMPLETED | OUTPATIENT
Start: 2021-12-19 | End: 2021-12-19

## 2021-12-19 RX ORDER — IOPAMIDOL 755 MG/ML
70 INJECTION, SOLUTION INTRAVASCULAR ONCE
Status: COMPLETED | OUTPATIENT
Start: 2021-12-19 | End: 2021-12-19

## 2021-12-19 RX ADMIN — IOPAMIDOL 70 ML: 755 INJECTION, SOLUTION INTRAVENOUS at 10:11

## 2021-12-19 RX ADMIN — ACETAMINOPHEN 1000 MG: 500 TABLET, FILM COATED ORAL at 09:10

## 2021-12-19 ASSESSMENT — ENCOUNTER SYMPTOMS
NAUSEA: 0
DIARRHEA: 0
VOMITING: 0

## 2021-12-19 NOTE — ED TRIAGE NOTES
A&O x4, ABCs intact. Pt presents with pain under left breast that radiates into the back, SOB and cough that she has had since 0430 this morning. Pt was seen at urgent care and sent to ED. COVID test is pending. Pt reports being vaccinated for COVID.

## 2021-12-19 NOTE — ED PROVIDER NOTES
History   Chief Complaint:  Chest Pain       The history is provided by the patient.      Anahi Hannah is a 41 year old female who presents with left sided chest pain. Symptoms began yesterday and she took a COVID-19 test yesterday, but results are pending. Earlier today, she was seen at Urgent Care. She denies nausea, vomiting, and diarrhea. Symptoms are exacerbated by coughing, breathing, and laughing. Pain radiates to her left arm. She has not had her period in 8 months, but she typically does not have regular periods.  Denies known heart or lungs problems or history of blood clots. She works at a memory care unit.      Review of Systems   Cardiovascular: Positive for chest pain.   Gastrointestinal: Negative for diarrhea, nausea and vomiting.   All other systems reviewed and are negative.        Allergies:  Fluconazole  Hydromorphone  Amoxicillin  Bacitracin-Polymyxin B  Cephalosporins  Hydrocortisone  Neomycin Sulfate  Pcn [Penicillins]  Polymyxin B  Polymyxin B Sulfate  Dilaudid Cough  Neomycin    Medications:  Biotin   Lansoprazole  Phenylephrine     Past Medical History:     Crushing injury of ankle and foot  Egg Yolk allergy   Gastroesophageal reflux disease  without esophagitis  Pneumonia of right lower lung due to infectious organism   Kidney stone   Depression   Cancer  Chronic kidney disease  Migraines     Past Surgical History:    Cervical biopsy   Hip surgery   [place] teeth extraction   [place] ST guide wire  Gynecological cryosurgery     Family History:    Mother: diabetes mellitus, heart disease, hypertension, hyperlipidemia   Father: arthritis     Social History:  Escorted to ER by spouse.   PCP: Louann Gurrola  She works at a memory care unit.    Physical Exam     Patient Vitals for the past 24 hrs:   BP Temp Pulse Resp SpO2 Weight   12/19/21 1114 -- -- -- 16 -- --   12/19/21 1000 115/81 -- 72 10 97 % --   12/19/21 0950 126/75 -- 85 12 97 % --   12/19/21 0940 -- -- 78 8 98 % --   12/19/21  0930 128/72 -- 94 20 92 % --   12/19/21 0920 129/64 -- -- -- 97 % --   12/19/21 0910 -- -- -- -- 100 % --   12/19/21 0900 (!) 144/104 -- 90 -- 99 % --   12/19/21 0851 (!) 141/102 98.4  F (36.9  C) 90 24 98 % 95.3 kg (210 lb)       Physical Exam    HENT:  mmm, no rhinorrhea  Eyes: periorbital tissues and sclera normal   Neck: supple, no abnormal swelling  Lungs:  CTAB,  no resp distress, TTP L anterior lower chestwall  CV: rrr, no m/r/g, ppi  Abd: soft, nontender, nondistended, no rebound/masses/guarding/hsm  Ext: no peripheral edema  Skin: warm, dry, well perfused, no rashes/bruising/lesions on exposed skin  Neuro: alert, MAEE, no gross motor or sensory deficits, gait stable  Psych: Normal mood, normal affect      Emergency Department Course   ECG:  ECG taken at 0925, ECG read at 0931  Normal sinus rhythm  Normal ECG  Rate 80 bpm. ND interval 164 ms. QRS duration 72 ms. QT/QTc 392/452 ms. P-R-T axes 5 -17 12.      Imaging:  CT Chest Pulmonary Embolism w Contrast   Final Result   IMPRESSION:   1.  No acute abnormality demonstrated in the chest. Specifically, there is no evidence of pulmonary embolism.           Report per radiology    Laboratory:  Labs Ordered and Resulted from Time of ED Arrival to Time of ED Departure   D DIMER QUANTITATIVE - Abnormal       Result Value    D-Dimer Quantitative 0.51 (*)    BASIC METABOLIC PANEL - Abnormal    Sodium 139      Potassium 3.7      Chloride 105      Carbon Dioxide (CO2) 26      Anion Gap 8      Urea Nitrogen 9      Creatinine 0.65      Calcium 9.3      Glucose 103 (*)     GFR Estimate >90     TROPONIN I - Normal    Troponin I High Sensitivity 4     HCG QUALITATIVE PREGNANCY - Normal    hCG Serum Qualitative Negative     CBC WITH PLATELETS AND DIFFERENTIAL    WBC Count 9.6      RBC Count 4.92      Hemoglobin 14.4      Hematocrit 44.8      MCV 91      MCH 29.3      MCHC 32.1      RDW 13.3      Platelet Count 360      % Neutrophils 60      % Lymphocytes 30      % Monocytes 5       % Eosinophils 4      % Basophils 1      % Immature Granulocytes 0      NRBCs per 100 WBC 0      Absolute Neutrophils 5.7      Absolute Lymphocytes 2.9      Absolute Monocytes 0.5      Absolute Eosinophils 0.3      Absolute Basophils 0.1      Absolute Immature Granulocytes 0.0      Absolute NRBCs 0.0          Emergency Department Course:  Reviewed:  I reviewed nursing notes, vitals, past medical history and Care Everywhere    Assessments:  0900 I obtained history and examined the patient as noted above.   1103 I rechecked the patient and explained findings.     Interventions:  Medications   acetaminophen (TYLENOL) tablet 1,000 mg (1,000 mg Oral Given 21 0910)   iopamidol (ISOVUE-370) solution 70 mL (70 mLs Intravenous Given 21 1011)   sodium chloride (PF) 0.9% PF flush 70 mL (70 mLs Intravenous Given 21 1012)       Disposition:  The patient was discharged to home.     Impression & Plan         Medical Decision Makin-year-old female here with pleuritic anterior left chest pain work-up here shows no acute cardiopulmonary etiology.  ECG without evidence of ischemia, CT negative for PE pneumothorax lobar pneumonia or other concerning etiology.  Troponin not elevated and in combination with ECG and presenting symptoms I think it is quite unlikely he has an occlusive coronary process.  We discussed what is known and unknown this may well be musculoskeletal or pleurisy and treatment is supportive.  She is comfortable agree with the plan return with new or worsening symptoms.      Diagnosis:    ICD-10-CM    1. Chest wall pain  R07.89        Scribe Disclosure:  I, Rajni Augustin, am serving as a scribe at 8:54 AM on 2021 to document services personally performed by Jerzy Day MD based on my observations and the provider's statements to me.            Jerzy Day MD  21 [id number]

## 2021-12-20 LAB
ATRIAL RATE - MUSE: 80 BPM
DIASTOLIC BLOOD PRESSURE - MUSE: NORMAL MMHG
INTERPRETATION ECG - MUSE: NORMAL
P AXIS - MUSE: 5 DEGREES
PR INTERVAL - MUSE: 164 MS
QRS DURATION - MUSE: 72 MS
QT - MUSE: 392 MS
QTC - MUSE: 452 MS
R AXIS - MUSE: -17 DEGREES
SYSTOLIC BLOOD PRESSURE - MUSE: NORMAL MMHG
T AXIS - MUSE: 12 DEGREES
VENTRICULAR RATE- MUSE: 80 BPM

## 2022-01-10 DIAGNOSIS — K21.9 GASTROESOPHAGEAL REFLUX DISEASE WITHOUT ESOPHAGITIS: ICD-10-CM

## 2022-01-10 RX ORDER — LANSOPRAZOLE 30 MG/1
CAPSULE, DELAYED RELEASE ORAL
Qty: 90 CAPSULE | Refills: 0 | Status: SHIPPED | OUTPATIENT
Start: 2022-01-10 | End: 2022-04-01

## 2022-01-11 NOTE — TELEPHONE ENCOUNTER
Prescription approved per [facility] Refill Protocol.  Jordana ALMARAZ RN, BSN

## 2022-01-17 ASSESSMENT — ENCOUNTER SYMPTOMS
DIARRHEA: 0
PARESTHESIAS: 0
FEVER: 0
HEMATOCHEZIA: 0
HEADACHES: 1
DYSURIA: 0
HEARTBURN: 1
PALPITATIONS: 0
CHILLS: 0
SORE THROAT: 0
HEMATURIA: 0
DIZZINESS: 0
WEAKNESS: 0
FREQUENCY: 0
BREAST MASS: 0
NAUSEA: 0
JOINT SWELLING: 0
ARTHRALGIAS: 1
EYE PAIN: 0
ABDOMINAL PAIN: 0
SHORTNESS OF BREATH: 0
NERVOUS/ANXIOUS: 0
COUGH: 0
MYALGIAS: 0
CONSTIPATION: 0

## 2022-01-19 ENCOUNTER — OFFICE VISIT (OUTPATIENT)
Dept: INTERNAL MEDICINE | Facility: CLINIC | Age: 42
End: 2022-01-19
Payer: COMMERCIAL

## 2022-01-19 VITALS
TEMPERATURE: 97.2 F | BODY MASS INDEX: 31.1 KG/M2 | HEART RATE: 101 BPM | SYSTOLIC BLOOD PRESSURE: 122 MMHG | DIASTOLIC BLOOD PRESSURE: 80 MMHG | RESPIRATION RATE: 14 BRPM | OXYGEN SATURATION: 97 % | HEIGHT: 69 IN | WEIGHT: 210 LBS

## 2022-01-19 DIAGNOSIS — Z00.00 HEALTHCARE MAINTENANCE: Primary | ICD-10-CM

## 2022-01-19 DIAGNOSIS — D25.9 UTERINE LEIOMYOMA, UNSPECIFIED LOCATION: ICD-10-CM

## 2022-01-19 LAB
CHOLEST SERPL-MCNC: 188 MG/DL
FASTING STATUS PATIENT QL REPORTED: YES
FASTING STATUS PATIENT QL REPORTED: YES
GLUCOSE BLD-MCNC: 100 MG/DL (ref 70–99)
HDLC SERPL-MCNC: 48 MG/DL
LDLC SERPL CALC-MCNC: 106 MG/DL
NONHDLC SERPL-MCNC: 140 MG/DL
TRIGL SERPL-MCNC: 168 MG/DL

## 2022-01-19 PROCEDURE — 36415 COLL VENOUS BLD VENIPUNCTURE: CPT | Performed by: NURSE PRACTITIONER

## 2022-01-19 PROCEDURE — 80061 LIPID PANEL: CPT | Performed by: NURSE PRACTITIONER

## 2022-01-19 PROCEDURE — 99396 PREV VISIT EST AGE 40-64: CPT | Performed by: NURSE PRACTITIONER

## 2022-01-19 PROCEDURE — 82947 ASSAY GLUCOSE BLOOD QUANT: CPT | Performed by: NURSE PRACTITIONER

## 2022-01-19 RX ORDER — AZITHROMYCIN 250 MG/1
TABLET, FILM COATED ORAL
COMMUNITY
Start: 2022-01-17 | End: 2022-02-07

## 2022-01-19 ASSESSMENT — ENCOUNTER SYMPTOMS
FREQUENCY: 0
NERVOUS/ANXIOUS: 0
HEARTBURN: 1
BREAST MASS: 0
HEMATURIA: 0
ARTHRALGIAS: 1
EYE PAIN: 0
DIARRHEA: 0
FEVER: 0
SHORTNESS OF BREATH: 0
HEMATOCHEZIA: 0
NAUSEA: 0
COUGH: 0
CHILLS: 0
PALPITATIONS: 0
PARESTHESIAS: 0
DYSURIA: 0
DIZZINESS: 0
SORE THROAT: 0
CONSTIPATION: 0
JOINT SWELLING: 0
WEAKNESS: 0
ABDOMINAL PAIN: 0
MYALGIAS: 0
HEADACHES: 1

## 2022-01-19 ASSESSMENT — MIFFLIN-ST. JEOR: SCORE: 1671.93

## 2022-01-19 NOTE — PROGRESS NOTES
SUBJECTIVE:   CC: Anahi Hannah is an 41 year old woman who presents for preventive health visit.       Patient has been advised of split billing requirements and indicates understanding: Yes  Healthy Habits:     Getting at least 3 servings of Calcium per day:  Yes    Bi-annual eye exam:  Yes    Dental care twice a year:  Yes    Sleep apnea or symptoms of sleep apnea:  Excessive snoring    Diet:  Regular (no restrictions)    Frequency of exercise:  None    Taking medications regularly:  Yes    Medication side effects:  Lightheadedness and Other    PHQ-2 Total Score: 0    Additional concerns today:  Yes              H/o uterine fibroids, no f/u due to insurance coverage  Amenorrhea x 1 year     Today's PHQ-2 Score:   PHQ-2 ( 1999 Pfizer) 1/17/2022   Q1: Little interest or pleasure in doing things 0   Q2: Feeling down, depressed or hopeless 0   PHQ-2 Score 0   PHQ-2 Total Score (12-17 Years)- Positive if 3 or more points; Administer PHQ-A if positive -   Q1: Little interest or pleasure in doing things Not at all   Q2: Feeling down, depressed or hopeless Not at all   PHQ-2 Score 0       Abuse: Current or Past (Physical, Sexual or Emotional) - No  Do you feel safe in your environment? Yes    Have you ever done Advance Care Planning? (For example, a Health Directive, POLST, or a discussion with a medical provider or your loved ones about your wishes): No, advance care planning information given to patient to review.  Patient plans to discuss their wishes with loved ones or provider.      Social History     Tobacco Use     Smoking status: Never Smoker     Smokeless tobacco: Never Used   Substance Use Topics     Alcohol use: No     If you drink alcohol do you typically have >3 drinks per day or >7 drinks per week? No    Alcohol Use 1/17/2022   Prescreen: >3 drinks/day or >7 drinks/week? No       Reviewed orders with patient.  Reviewed health maintenance and updated orders accordingly - Yes  BP Readings from Last 3  Encounters:   01/19/22 122/80   12/19/21 115/81   01/18/21 110/72    Wt Readings from Last 3 Encounters:   01/19/22 95.3 kg (210 lb)   12/19/21 95.3 kg (210 lb)   01/18/21 93 kg (205 lb)                  Patient Active Problem List   Diagnosis     Gastroesophageal reflux disease without esophagitis     Past Surgical History:   Procedure Laterality Date     GYNECOLOGIC CRYOSURGERY       HIP SURGERY      Congenital hip dysplasia     WISDOM ST GUIDEWIRE       WISDOM TOOTH EXTRACTION         Social History     Tobacco Use     Smoking status: Never Smoker     Smokeless tobacco: Never Used   Substance Use Topics     Alcohol use: No     Family History   Problem Relation Age of Onset     Diabetes Mother      Heart Disease Mother      Hypertension Mother      Hyperlipidemia Mother      Arthritis Father      Depression Maternal Grandmother      Heart Disease Maternal Grandmother      Hypertension Maternal Grandmother      Hyperlipidemia Maternal Grandmother      Depression Maternal Grandfather      Heart Disease Maternal Grandfather      Hypertension Maternal Grandfather      Hyperlipidemia Maternal Grandfather      Cancer Paternal Grandfather      Breast Cancer No family hx of      Colon Cancer No family hx of      Cerebrovascular Disease No family hx of          Current Outpatient Medications   Medication Sig Dispense Refill     aspirin-acetaminophen-caffeine (EXCEDRIN MIGRAINE) 250-250-65 MG per tablet Take 1 tablet by mouth every 6 hours as needed for headaches 80 tablet      azithromycin (ZITHROMAX) 250 MG tablet        Biotin (BIOTIN FORTE) 3 MG TABS        LANsoprazole (PREVACID) 30 MG DR capsule TAKE 1 CAPSULE BY MOUTH EVERY DAY 90 capsule 0     phenylephrine HCl 10 MG TABS  84 tablet      cyclobenzaprine (FLEXERIL) 10 MG tablet Take 1 tablet (10 mg) by mouth 2 times daily as needed for muscle spasms 30 tablet 0       Breast Cancer Screening:    FHS-7:   Breast CA Risk Assessment (FHS-7) 1/15/2022 1/17/2022   Did any  of your first-degree relatives have breast or ovarian cancer? No No   Did any of your relatives have bilateral breast cancer? Unknown Unknown   Did any man in your family have breast cancer? No No   Did any woman in your family have breast and ovarian cancer? Yes Yes   Did any woman in your family have breast cancer before age 50 y? No No   Do you have 2 or more relatives with breast and/or ovarian cancer? Unknown Unknown   Do you have 2 or more relatives with breast and/or bowel cancer? Yes Yes       Mammogram Screening - Offered annual screening and updated Health Maintenance for [facility] plan based on risk factor consideration    Pertinent mammograms are reviewed under the imaging tab.    History of abnormal Pap smear: NO - age 30- 65 PAP every 3 years recommended  PAP / HPV Latest Ref Rng & Units 10/3/2018   PAP - Negative for squamous intraepithelial lesion or malignancy  Electronically signed by Kim Baum CT (ASCP) on 10/11/2018 at  2:48 PM     HPV16 NEG Negative   HPV18 NEG Negative   HRHPV NEG Negative     Reviewed and updated as needed this visit by clinical staff  Tobacco  Allergies  Meds   Med Hx  Surg Hx  Fam Hx  Soc Hx       Reviewed and updated as needed this visit by Provider                   Review of Systems   Constitutional: Negative for chills and fever.   HENT: Negative for congestion, ear pain, hearing loss and sore throat.    Eyes: Negative for pain and visual disturbance.   Respiratory: Negative for cough and shortness of breath.    Cardiovascular: Positive for peripheral edema. Negative for chest pain and palpitations.   Gastrointestinal: Positive for heartburn. Negative for abdominal pain, constipation, diarrhea, hematochezia and nausea.   Breasts:  Positive for tenderness. Negative for breast mass and discharge.   Genitourinary: Positive for pelvic pain and vaginal discharge. Negative for dysuria, frequency, genital sores, hematuria, urgency and vaginal bleeding.  "  Musculoskeletal: Positive for arthralgias. Negative for joint swelling and myalgias.   Skin: Negative for rash.   Neurological: Positive for headaches. Negative for dizziness, weakness and paresthesias.   Psychiatric/Behavioral: Negative for mood changes. The patient is not nervous/anxious.           OBJECTIVE:   /80   Pulse 101   Temp 97.2  F (36.2  C) (Tympanic)   Resp 14   Ht 1.753 m (5' 9\")   Wt 95.3 kg (210 lb)   LMP 03/08/2021 (Exact Date)   SpO2 97%   Breastfeeding No   BMI 31.01 kg/m    Physical Exam  GENERAL: alert, no distress and obese  EYES: Eyes grossly normal to inspection, PERRL and conjunctivae and sclerae normal  NECK: no adenopathy, no asymmetry, masses, or scars and thyroid normal to palpation  RESP: lungs clear to auscultation - no rales, rhonchi or wheezes  CV: regular rate and rhythm, normal S1 S2, no S3 or S4, no murmur, click or rub, no peripheral edema and peripheral pulses strong  ABDOMEN: soft, nontender, no hepatosplenomegaly, no masses and bowel sounds normal  SKIN: multiple flesh collored round papules upper back, dark brown oval sub keratosis L upper back  PSYCH: mentation appears normal and anxious        ASSESSMENT/PLAN:       ICD-10-CM    1. Healthcare maintenance  Z00.00    2. Uterine leiomyoma, unspecified location  D25.9 Ob/Gyn Referral       Patient has been advised of split billing requirements and indicates understanding: Yes    COUNSELING:  Reviewed preventive health counseling, as reflected in patient instructions    Estimated body mass index is 31.01 kg/m  as calculated from the following:    Height as of this encounter: 1.753 m (5' 9\").    Weight as of this encounter: 95.3 kg (210 lb).    Weight management plan: Discussed healthy diet and exercise guidelines    She reports that she has never smoked. She has never used smokeless tobacco.      Counseling Resources:  ATP IV Guidelines  Pooled Cohorts Equation Calculator  Breast Cancer Risk " Calculator  BRCA-Related Cancer Risk Assessment: FHS-7 Tool  FRAX Risk Assessment  ICSI Preventive Guidelines  Dietary Guidelines for Americans, 2010  USDA's MyPlate  ASA Prophylaxis  Lung CA Screening    Misti Rodas NP  [facility]

## 2022-02-07 ENCOUNTER — OFFICE VISIT (OUTPATIENT)
Dept: OBGYN | Facility: CLINIC | Age: 42
End: 2022-02-07
Payer: COMMERCIAL

## 2022-02-07 VITALS
SYSTOLIC BLOOD PRESSURE: 118 MMHG | DIASTOLIC BLOOD PRESSURE: 70 MMHG | BODY MASS INDEX: 31.55 KG/M2 | WEIGHT: 213 LBS | HEIGHT: 69 IN

## 2022-02-07 DIAGNOSIS — N97.9 FEMALE INFERTILITY: Primary | ICD-10-CM

## 2022-02-07 DIAGNOSIS — D25.9 UTERINE LEIOMYOMA, UNSPECIFIED LOCATION: ICD-10-CM

## 2022-02-07 LAB
ESTRADIOL SERPL-MCNC: 474 PG/ML
FSH SERPL-ACNC: 1.4 IU/L
PROLACTIN SERPL-MCNC: 16 UG/L (ref 3–27)
SHBG SERPL-SCNC: 175 NMOL/L (ref 30–135)

## 2022-02-07 PROCEDURE — 82670 ASSAY OF TOTAL ESTRADIOL: CPT | Performed by: OBSTETRICS & GYNECOLOGY

## 2022-02-07 PROCEDURE — 84403 ASSAY OF TOTAL TESTOSTERONE: CPT | Performed by: OBSTETRICS & GYNECOLOGY

## 2022-02-07 PROCEDURE — 84146 ASSAY OF PROLACTIN: CPT | Performed by: OBSTETRICS & GYNECOLOGY

## 2022-02-07 PROCEDURE — 83001 ASSAY OF GONADOTROPIN (FSH): CPT | Performed by: OBSTETRICS & GYNECOLOGY

## 2022-02-07 PROCEDURE — 36415 COLL VENOUS BLD VENIPUNCTURE: CPT | Performed by: OBSTETRICS & GYNECOLOGY

## 2022-02-07 PROCEDURE — 84270 ASSAY OF SEX HORMONE GLOBUL: CPT | Performed by: OBSTETRICS & GYNECOLOGY

## 2022-02-07 PROCEDURE — 82627 DEHYDROEPIANDROSTERONE: CPT | Performed by: OBSTETRICS & GYNECOLOGY

## 2022-02-07 PROCEDURE — 84443 ASSAY THYROID STIM HORMONE: CPT | Performed by: OBSTETRICS & GYNECOLOGY

## 2022-02-07 PROCEDURE — 99204 OFFICE O/P NEW MOD 45 MIN: CPT | Performed by: OBSTETRICS & GYNECOLOGY

## 2022-02-07 PROCEDURE — 83520 IMMUNOASSAY QUANT NOS NONAB: CPT | Mod: 90 | Performed by: OBSTETRICS & GYNECOLOGY

## 2022-02-07 PROCEDURE — 99000 SPECIMEN HANDLING OFFICE-LAB: CPT | Performed by: OBSTETRICS & GYNECOLOGY

## 2022-02-07 ASSESSMENT — MIFFLIN-ST. JEOR: SCORE: 1685.54

## 2022-02-07 NOTE — PROGRESS NOTES
"  SUBJECTIVE:                                                   CC:  Patient presents with:  Fertility: trying to conceive 2 yrs, no period in a year      HPI:  Anahi Hannah is a 41 year old  who presents for primary infertility of 2 years duration.    She got  7 months ago.  They had been having unprotected sex for about 2 years total.  She did have a work up for infertility two years ago, was found to have fibroids, one blocked tube (left side blocked, right side patent), and now hasn't had a period since 2021.      Periods are irregular - she went through a time where she didn't have a cycle for 6  Months, then had regular monthly periods for awhile.  LMP was 2021.  She doesn't have hot flashes per se, but she is generally much warmer than before (didn't even wear a coat today and the temp is 3 degrees).  +crown thinning.  Doesn't remember that she ever had any labs diagnosing PCOS or menopause.      is Khurram Hannah,  73 [birth year] 74.  No children.  Has never done a SA.      She is noted to have left tube blocked, probably by a fibroid.   No recent uterus imaging done.     On a women's one a day vitamin.     Gyn History:  Patient's last menstrual period was 2021 (approximate).       Using none for contraception.    Last 3 Pap and HPV Results:   PAP / HPV Latest Ref Rng & Units 10/3/2018   PAP - Negative for squamous intraepithelial lesion or malignancy  Electronically signed by Kim Baum CT (ASCP) on 10/11/2018 at  2:48 PM     HPV16 NEG Negative   HPV18 NEG Negative   HRHPV NEG Negative       PMH, PSH, Soc Hx, Fam Hx, Meds, and allergies reviewed in Epic.    OBJECTIVE:     /70 (BP Location: Left arm, Patient Position: Chair, Cuff Size: Adult Large)   Ht 1.753 m (5' 9\")   Wt 96.6 kg (213 lb)   LMP 2021 (Approximate)   Breastfeeding No   BMI 31.45 kg/m      Gen: Healthy appearing female, no acute distress, " comfortable  Skin: +crown thinning, no acne noted  Psych: mentation appears normal and affect bright, tearful and appropriate to the situation    Test Results:    Pelvic US 10/2/19:  COMPARISON:  None.     TECHNIQUE:  Transabdominal and transvaginal imaging was performed.     FINDINGS:     Uterus: Measures 13.7 x 9.8 x 11.0 cm. Marked heterogeneity with a 9 cm left mid uterine fibroid with additional smaller fibroids suspected.     Endometrium: Not well-visualized. What was felt to reflect the endometrial stripe at time of imaging measures 0.9 cm in thickness although static images are somewhat equivocal.       Right Ovary: Measures 2.3 x 1.1 x 2.1 cm and appears unremarkable.     Right Ovary Blood Flow: Present.     Left Ovary: Measures 6.1 x 3.9 x 5.1 cm and contains a 4.7 cm hypoechoic lesion with sonographic appearance most compatible with hemorrhagic cyst.     Left Ovary Blood Flow: Present.     Free Fluid: no significant free fluid.     IMPRESSION:   1. Enlarged leiomyomatous uterus. Largest fibroid measures at least 9 cm.     2. Endometrial stripe is not well-visualized. What was felt to be the endometrial stripe at time of scanning measures 0.9 cm in thickness. No definite intracavitary fibroids.     3. 4.7 cm left ovarian hemorrhagic cyst.    HSG 1/9/2020  FINDINGS: Prior ultrasound demonstrated an enlarged myomatous uterus with distortion and limited visualization of the endometrial stripe.     Filling of the uterine cavity was observed fluoroscopically. The uterine cavity showed irregularity on early partially filled images but smoothed out with increased distention. There is no clear evidence of an intracavitary fibroid. The right fallopian tube filled and was normal in caliber. There is spillage into the peritoneal cavity from the right adnexa. There is filling of the left uterine cornua, but no filled portion of the left fallopian tube was demonstrated.       IMPRESSION:       1. The right fallopian  tube appears patent.   2. No filling of the left fallopian tube was demonstrated.   3. The initial irregularity of the uterine cavity is likely related to known underlying fibroids. No clear evidence of an intracavitary fibroid with this modality.      ASSESSMENT/PLAN:                                                      1. Female infertility.  History of fibroids, blocked left fallopian tube, and concern for menopause.   Extensive discussion of physiology of pregnancy and different factors for infertility.  She is high risk of ovulation factor, high risk male factor, high risk tubal factor, moderate risk uterine factor.  The work up for each of these was outlined in detail, including the possibilities to do step-wise testing starting with more likely factors, moving to the more invasive testing that is lower yield and possibly not covered by insurance.  Discussed that infertility probably will need to be treated with IVF and possibly even IVF with a donor egg, and I did discuss referral to infertility specialist.  She elects for the below testing today, will do pelvic US and SA and follow-up as needed.    - US Pelvic Transabdominal and Transvaginal; Future  - Follicle stimulating hormone  - Mullerian Hormone Antibody  - TSH with free T4 reflex  - DHEA sulfate  - Estradiol  - Prolactin  - Testosterone Bioavailable  - semen analysis for her partner    Autumn Boyd MD, MPH  Obstetrics and Gynecology      Total time spent was 45 minutes; this includes pre-work time, intra-service time, and post-work time including time spent on documentation which occurred on the date of service.

## 2022-02-07 NOTE — NURSING NOTE
"Chief Complaint   Patient presents with     Fertility     trying to conceive 2 yrs, no period in a year       Initial /70 (BP Location: Left arm, Patient Position: Chair, Cuff Size: Adult Large)   Ht 1.753 m (5' 9\")   Wt 96.6 kg (213 lb)   LMP 2021 (Approximate)   Breastfeeding No   BMI 31.45 kg/m   Estimated body mass index is 31.45 kg/m  as calculated from the following:    Height as of this encounter: 1.753 m (5' 9\").    Weight as of this encounter: 96.6 kg (213 lb).  BP completed using cuff size: large    Questioned patient about current smoking habits.  Pt. has never smoked.          The following HM Due: NONE    Abigail Mcbride CMA    "

## 2022-02-08 LAB
DHEA-S SERPL-MCNC: 28 UG/DL (ref 35–430)
TSH SERPL DL<=0.005 MIU/L-ACNC: 3.48 MU/L (ref 0.4–4)

## 2022-02-09 LAB
TESTOST FREE SERPL-MCNC: 0.19 NG/DL
TESTOST SERPL-MCNC: 37 NG/DL (ref 8–60)

## 2022-02-10 ENCOUNTER — HOSPITAL ENCOUNTER (OUTPATIENT)
Dept: MAMMOGRAPHY | Facility: CLINIC | Age: 42
Discharge: HOME | End: 2022-02-10
Attending: NURSE PRACTITIONER | Admitting: NURSE PRACTITIONER
Payer: COMMERCIAL

## 2022-02-10 DIAGNOSIS — Z12.31 VISIT FOR SCREENING MAMMOGRAM: ICD-10-CM

## 2022-02-10 LAB — MIS SERPL-MCNC: <0.003 NG/ML

## 2022-02-10 PROCEDURE — 77067 SCR MAMMO BI INCL CAD: CPT

## 2022-02-11 ENCOUNTER — ANCILLARY PROCEDURE (OUTPATIENT)
Dept: ULTRASOUND IMAGING | Facility: CLINIC | Age: 42
End: 2022-02-11
Attending: OBSTETRICS & GYNECOLOGY
Payer: COMMERCIAL

## 2022-02-11 DIAGNOSIS — N97.9 FEMALE INFERTILITY: ICD-10-CM

## 2022-02-11 PROCEDURE — 76830 TRANSVAGINAL US NON-OB: CPT | Performed by: OBSTETRICS & GYNECOLOGY

## 2022-02-11 PROCEDURE — 76856 US EXAM PELVIC COMPLETE: CPT | Performed by: OBSTETRICS & GYNECOLOGY

## 2022-03-22 ENCOUNTER — OFFICE VISIT (OUTPATIENT)
Dept: OBGYN | Facility: CLINIC | Age: 42
End: 2022-03-22
Payer: COMMERCIAL

## 2022-03-22 VITALS
HEIGHT: 69 IN | SYSTOLIC BLOOD PRESSURE: 118 MMHG | WEIGHT: 210 LBS | BODY MASS INDEX: 31.1 KG/M2 | DIASTOLIC BLOOD PRESSURE: 78 MMHG

## 2022-03-22 DIAGNOSIS — R93.89 THICKENED ENDOMETRIUM: Primary | ICD-10-CM

## 2022-03-22 PROCEDURE — 88305 TISSUE EXAM BY PATHOLOGIST: CPT | Performed by: PATHOLOGY

## 2022-03-22 PROCEDURE — 58100 BIOPSY OF UTERUS LINING: CPT | Performed by: OBSTETRICS & GYNECOLOGY

## 2022-03-22 NOTE — PROGRESS NOTES
Endometrial biopsy procedure note  3/22/2022     INDICATIONS:                                                    Is a pregnancy test required: No. Just did a UPT and was negative this AM.  Was a consent obtained?  Yes    Having endometrial biopsy for thickened endometrium    PROCEDURE;                                                      A speculum was placed in the vagina and cervix prepped with betadine. A tenaculum was attached to the cervix. A small plastic 5 mm Pipelle syringe curette was inserted into the cervical canal. The uterus was sounded to 7.5 cm's. A vigorous four quadrant biopsy was performed, removing amount moderate of tissue. The speculum was removed. This tissue was placed in Formalin and sent to pathology.    The patient tolerated the procedure  fairly well and she reported there was  cramping.      POST PROCEDURE;                                                      There  was no cramping at the time of discharge. She  tolerated the procedure well. There were no complications. Patient was discharged in stable condition.    Patient advised to call the clinic if severe pelvic pain, fever or heavy bleeding.    Autumn Boyd MD      1. Thickened endometrium  - ENDOMETRIAL BIOPSY W/O CERVICAL DILATION   - surg path    Autumn Boyd MD, MPH  [facility]

## 2022-03-22 NOTE — NURSING NOTE
"Chief Complaint   Patient presents with     EMB       Initial /78 (BP Location: Left arm, Patient Position: Chair, Cuff Size: Adult Regular)   Ht 1.753 m (5' 9\")   Wt 95.3 kg (210 lb)   LMP 2021 (Exact Date)   Breastfeeding No   BMI 31.01 kg/m   Estimated body mass index is 31.01 kg/m  as calculated from the following:    Height as of this encounter: 1.753 m (5' 9\").    Weight as of this encounter: 95.3 kg (210 lb).  BP completed using cuff size: regular    Questioned patient about current smoking habits.  Pt. has never smoked.          The following HM Due: NONE    Abigail Mcbride CMA    "

## 2022-03-25 ENCOUNTER — DOCUMENTATION ONLY (OUTPATIENT)
Dept: OTHER | Facility: CLINIC | Age: 42
End: 2022-03-25
Payer: COMMERCIAL

## 2022-03-25 LAB
PATH REPORT.COMMENTS IMP SPEC: NORMAL
PATH REPORT.COMMENTS IMP SPEC: NORMAL
PATH REPORT.FINAL DX SPEC: NORMAL
PATH REPORT.GROSS SPEC: NORMAL
PATH REPORT.MICROSCOPIC SPEC OTHER STN: NORMAL
PATH REPORT.RELEVANT HX SPEC: NORMAL
PHOTO IMAGE: NORMAL

## 2022-03-31 DIAGNOSIS — K21.9 GASTROESOPHAGEAL REFLUX DISEASE WITHOUT ESOPHAGITIS: ICD-10-CM

## 2022-04-01 NOTE — TELEPHONE ENCOUNTER
Lansoprazole 30 mg  Routing refill request to provider for review/approval because:  Routed to last primary provider to see patient. Previous prescribed by Katlyn Gurrola.

## 2022-04-04 ENCOUNTER — DOCUMENTATION ONLY (OUTPATIENT)
Dept: OTHER | Facility: CLINIC | Age: 42
End: 2022-04-04
Payer: COMMERCIAL

## 2022-04-04 RX ORDER — LANSOPRAZOLE 30 MG/1
CAPSULE, DELAYED RELEASE ORAL
Qty: 90 CAPSULE | Refills: 2 | Status: SHIPPED | OUTPATIENT
Start: 2022-04-04 | End: 2022-12-26

## 2022-04-18 ENCOUNTER — TELEPHONE (OUTPATIENT)
Dept: OBGYN | Facility: CLINIC | Age: 42
End: 2022-04-18
Payer: COMMERCIAL

## 2022-04-18 NOTE — TELEPHONE ENCOUNTER
Patient's spouse came by to drop his results from [facility] to our office. I handed these to Dr. Boyd herself to take a look at.

## 2022-04-21 ENCOUNTER — MYC MEDICAL ADVICE (OUTPATIENT)
Dept: OBGYN | Facility: CLINIC | Age: 42
End: 2022-04-21
Payer: COMMERCIAL

## 2022-07-13 ENCOUNTER — TELEPHONE (OUTPATIENT)
Dept: INTERNAL MEDICINE | Facility: CLINIC | Age: 42
End: 2022-07-13

## 2022-07-13 NOTE — TELEPHONE ENCOUNTER
Onesimo, from [facility], calling in to discuss medication Lansoprazole 30 mg. Basically patient was prescribed this back in 04/04/22 and tried getting a refill of this with her new insurance but medication is not covered. Patient will need another medication alternative that will be covered.     Case Reference#:   [id number]     Onesimo's Direct Line:   [phone]    [facility] Customer Line:   [phone]

## 2022-08-20 ENCOUNTER — E-VISIT (OUTPATIENT)
Dept: URGENT CARE | Facility: CLINIC | Age: 42
End: 2022-08-20
Payer: COMMERCIAL

## 2022-08-20 DIAGNOSIS — N39.0 ACUTE UTI (URINARY TRACT INFECTION): Primary | ICD-10-CM

## 2022-08-20 PROCEDURE — 99421 OL DIG E/M SVC 5-10 MIN: CPT | Performed by: NURSE PRACTITIONER

## 2022-08-20 RX ORDER — NITROFURANTOIN 25; 75 MG/1; MG/1
100 CAPSULE ORAL 2 TIMES DAILY
Qty: 10 CAPSULE | Refills: 0 | Status: SHIPPED | OUTPATIENT
Start: 2022-08-20 | End: 2022-08-25

## 2022-08-20 NOTE — PATIENT INSTRUCTIONS
Dear Anahi Hannah    After reviewing your responses, I've been able to diagnose you with a urinary tract infection, which is a common infection of the bladder with bacteria.  This is not a sexually transmitted infection, though urinating immediately after intercourse can help prevent infections.  Drinking lots of fluids is also helpful to clear your current infection and prevent the next one.      I have sent a prescription for antibiotics to your pharmacy to treat this infection.    It is important that you take all of your prescribed medication even if your symptoms are improving after a few doses.  Taking all of your medicine helps prevent the symptoms from returning.     If your symptoms worsen, you develop pain in your back or stomach, develop fevers, or are not improving in 5 days, please contact your primary care provider for an appointment or visit any of our convenient Walk-in or Urgent Care Centers to be seen, which can be found on our website here.    Thanks again for choosing us as your health care partner,    BING Cabrera CNP    Urinary Tract Infections in Women  Urinary tract infections (UTIs) are most often caused by bacteria. These bacteria enter the urinary tract. The bacteria may come from inside the body. Or they may travel from the skin outside the rectum or vagina into the urethra. Female anatomy makes it easy for bacteria from the bowel to enter a woman s urinary tract, which is the most common source of UTI. This means women develop UTIs more often than men. Pain in or around the urinary tract is a common UTI symptom. But the only way to know for sure if you have a UTI for the healthcare provider to test your urine. The two tests that may be done are the urinalysis and urine culture.     Types of UTIs    Cystitis. A bladder infection (cystitis) is the most common UTI in women. You may have urgent or frequent need to pee. You may also have pain, burning when you pee, and  bloody urine.    Urethritis. This is an inflamed urethra, which is the tube that carries urine from the bladder to outside the body. You may have lower stomach or back pain. You may also have urgent or frequent need to pee.    Pyelonephritis. This is a kidney infection. If not treated, it can be serious and damage your kidneys. In severe cases, you may need to stay in the hospital. You may have a fever and lower back pain.    Medicines to treat a UTI  Most UTIs are treated with antibiotics. These kill the bacteria. The length of time you need to take them depends on the type of infection. It may be as short as 3 days. If you have repeated UTIs, you may need a low-dose antibiotic for several months. Take antibiotics exactly as directed. Don t stop taking them until all of the medicine is gone. If you stop taking the antibiotic too soon, the infection may not go away. You may also develop a resistance to the antibiotic. This can make it much harder to treat.   Lifestyle changes to treat and prevent UTIs   The lifestyle changes below will help get rid of your UTI. They may also help prevent future UTIs.     Drink plenty of fluids. This includes water, juice, or other caffeine-free drinks. Fluids help flush bacteria out of your body.    Empty your bladder. Always empty your bladder when you feel the urge to pee. And always pee before going to sleep. Urine that stays in your bladder can lead to infection. Try to pee before and after sex as well.    Practice good personal hygiene. Wipe yourself from front to back after using the toilet. This helps keep bacteria from getting into the urethra.    Use condoms during sex. These help prevent UTIs caused by sexually transmitted bacteria. Also don't use spermicides during sex. These can increase the risk for UTIs. Choose other forms of birth control instead. For women who tend to get UTIs after sex, a low-dose of a preventive antibiotic may be used. Be sure to discuss this  option with your healthcare provider.    Follow up with your healthcare provider as directed. He or she may test to make sure the infection has cleared. If needed, more treatment may be started.  [employer] last reviewed this educational content on 7/1/2019 2000-2021 [employer]. All rights reserved. This information is not intended as a substitute for professional medical care. Always follow your healthcare professional's instructions.

## 2022-08-24 ENCOUNTER — OFFICE VISIT (OUTPATIENT)
Dept: URGENT CARE | Facility: URGENT CARE | Age: 42
End: 2022-08-24
Payer: COMMERCIAL

## 2022-08-24 VITALS
TEMPERATURE: 96.4 F | DIASTOLIC BLOOD PRESSURE: 83 MMHG | HEART RATE: 75 BPM | OXYGEN SATURATION: 98 % | SYSTOLIC BLOOD PRESSURE: 137 MMHG

## 2022-08-24 DIAGNOSIS — B37.31 CANDIDIASIS OF VAGINA: Primary | ICD-10-CM

## 2022-08-24 DIAGNOSIS — N89.8 VAGINAL ITCHING: ICD-10-CM

## 2022-08-24 DIAGNOSIS — R30.0 DYSURIA: ICD-10-CM

## 2022-08-24 LAB
ALBUMIN UR-MCNC: NEGATIVE MG/DL
APPEARANCE UR: CLEAR
BILIRUB UR QL STRIP: NEGATIVE
CLUE CELLS: ABNORMAL
COLOR UR AUTO: YELLOW
GLUCOSE UR STRIP-MCNC: NEGATIVE MG/DL
HGB UR QL STRIP: ABNORMAL
KETONES UR STRIP-MCNC: NEGATIVE MG/DL
LEUKOCYTE ESTERASE UR QL STRIP: ABNORMAL
NITRATE UR QL: NEGATIVE
PH UR STRIP: 5.5 [PH] (ref 5–7)
RBC #/AREA URNS AUTO: ABNORMAL /HPF
SP GR UR STRIP: <=1.005 (ref 1–1.03)
SQUAMOUS #/AREA URNS AUTO: ABNORMAL /LPF
TRICHOMONAS, WET PREP: ABNORMAL
UROBILINOGEN UR STRIP-ACNC: 0.2 E.U./DL
WBC #/AREA URNS AUTO: ABNORMAL /HPF
WBC'S/HIGH POWER FIELD, WET PREP: ABNORMAL
YEAST, WET PREP: PRESENT

## 2022-08-24 PROCEDURE — 87086 URINE CULTURE/COLONY COUNT: CPT | Performed by: PHYSICIAN ASSISTANT

## 2022-08-24 PROCEDURE — 81001 URINALYSIS AUTO W/SCOPE: CPT | Performed by: PHYSICIAN ASSISTANT

## 2022-08-24 PROCEDURE — 87210 SMEAR WET MOUNT SALINE/INK: CPT | Performed by: PHYSICIAN ASSISTANT

## 2022-08-24 PROCEDURE — 99213 OFFICE O/P EST LOW 20 MIN: CPT | Performed by: PHYSICIAN ASSISTANT

## 2022-08-24 RX ORDER — TERCONAZOLE 0.4 %
1 CREAM WITH APPLICATOR VAGINAL AT BEDTIME
Qty: 1 G | Refills: 0 | Status: SHIPPED | OUTPATIENT
Start: 2022-08-24 | End: 2022-08-31

## 2022-08-24 NOTE — PROGRESS NOTES
Patient presents with:  Urgent Care: Vaginal pain, itchy, and burning which started a week ago.      (B37.3) Candidiasis of vagina  (primary encounter diagnosis)  Comment:   Plan: terconazole (TERAZOL 7) 0.4 % vaginal cream            (N89.8) Vaginal itching  Comment:   Plan: Wet prep - Clinic Collect, UA macro with reflex        to Microscopic and Culture - Clinc Collect,         Urine Microscopic                  (R30.0) Dysuria  Comment:   Plan: Urine Culture Aerobic Bacterial - lab collect                        SUBJECTIVE:  Anahi Hannah is a 41 year old female who presents with vaginal discharge and itching.    Onset of symptoms 1 week(s) ago, gradually worsening since.     Pain:none.     Vaginal bleeding: No      Vaginal symptoms: vulvar itching  No LMP recorded. (Menstrual status: Irregular Periods).    Sexually active: yes,   Predisposing factors: recent antibiotic for UTI      Past Medical History:   Diagnosis Date     Crushing injury of ankle and foot      Gastroesophageal reflux disease without esophagitis      History of colposcopy with cervical biopsy     pos high risk HPV     Pneumonia of right lower lobe due to infectious organism      Tension headache      Current Outpatient Medications   Medication Sig Dispense Refill     Biotin 3 MG TABS        LANsoprazole (PREVACID) 30 MG DR capsule TAKE 1 CAPSULE BY MOUTH EVERY DAY 90 capsule 2     Multiple Vitamin (MULTIVITAMIN ADULT PO)        nitroFURantoin macrocrystal-monohydrate (MACROBID) 100 MG capsule Take 1 capsule (100 mg) by mouth 2 times daily for 5 days 10 capsule 0     phenylephrine HCl 10 MG TABS  84 tablet      terconazole (TERAZOL 7) 0.4 % vaginal cream Place 1 applicator vaginally At Bedtime for 7 days 1 g 0     aspirin-acetaminophen-caffeine (EXCEDRIN MIGRAINE) 250-250-65 MG per tablet Take 1 tablet by mouth every 6 hours as needed for headaches (Patient not taking: Reported on 2/7/2022) 80 tablet      Social History     Socioeconomic  History     Marital status:      Spouse name: Not on file     Number of children: Not on file     Years of education: Not on file     Highest education level: Not on file   Occupational History     Employer: [employer],[id number]     Employer: [employer]   Tobacco Use     Smoking status: Never Smoker     Smokeless tobacco: Never Used   Vaping Use     Vaping Use: Never used   Substance and Sexual Activity     Alcohol use: No     Drug use: No     Sexual activity: Yes     Partners: Male   Other Topics Concern     Parent/sibling w/ CABG, MI or angioplasty before 65F 55M? Not Asked   Social History Narrative     Not on file     Social Determinants of Health     Financial Resource Strain: Not on file   Food Insecurity: Not on file   Transportation Needs: Not on file   Physical Activity: Not on file   Stress: Not on file   Social Connections: Not on file   Intimate Partner Violence: Not on file   Housing Stability: Not on file       ROS:   CONSTITUTIONAL:NEGATIVE for fever, chills, change in weight  INTEGUMENTARY/SKIN: NEGATIVE for worrisome rashes, moles or lesions  : as per HPI    OBJECTIVE:  /83 (BP Location: Right arm, Patient Position: Sitting, Cuff Size: Adult Regular)   Pulse 75   Temp (!) 96.4  F (35.8  C) (Tympanic)   SpO2 98%   : external genitalia normal.  White discharge  GENERAL APPEARANCE: healthy, alert and no distress  BACK: No CVA tenderness

## 2022-08-25 LAB — BACTERIA UR CULT: NORMAL

## 2022-08-30 ENCOUNTER — TRANSFERRED RECORDS (OUTPATIENT)
Dept: HEALTH INFORMATION MANAGEMENT | Facility: CLINIC | Age: 42
End: 2022-08-30

## 2022-09-01 ENCOUNTER — E-VISIT (OUTPATIENT)
Dept: URGENT CARE | Facility: CLINIC | Age: 42
End: 2022-09-01
Payer: COMMERCIAL

## 2022-09-01 DIAGNOSIS — T78.40XA ALLERGIC REACTION, INITIAL ENCOUNTER: Primary | ICD-10-CM

## 2022-09-01 PROCEDURE — 99207 PR NON-BILLABLE SERV PER CHARTING: CPT | Performed by: PHYSICIAN ASSISTANT

## 2022-09-01 NOTE — PATIENT INSTRUCTIONS
Dear Anahi Hannah,    We are sorry you are not feeling well. Based on the responses you provided, it is recommended that you be seen in-person in urgent care so we can better evaluate your symptoms. Please click here to find the nearest urgent care location to you.   You will not be charged for this Visit. Thank you for trusting us with your care.    Fannie Maldonado PA-C

## 2022-09-02 ENCOUNTER — OFFICE VISIT (OUTPATIENT)
Dept: URGENT CARE | Facility: URGENT CARE | Age: 42
End: 2022-09-02
Payer: COMMERCIAL

## 2022-09-02 VITALS
SYSTOLIC BLOOD PRESSURE: 134 MMHG | OXYGEN SATURATION: 96 % | HEART RATE: 97 BPM | TEMPERATURE: 97.4 F | DIASTOLIC BLOOD PRESSURE: 84 MMHG

## 2022-09-02 DIAGNOSIS — R21 RASH AND NONSPECIFIC SKIN ERUPTION: Primary | ICD-10-CM

## 2022-09-02 DIAGNOSIS — F33.0 MILD RECURRENT MAJOR DEPRESSION (H): ICD-10-CM

## 2022-09-02 PROCEDURE — 99213 OFFICE O/P EST LOW 20 MIN: CPT | Performed by: FAMILY MEDICINE

## 2022-09-02 RX ORDER — CETIRIZINE HYDROCHLORIDE 10 MG/1
20 TABLET ORAL DAILY
Qty: 10 TABLET | Refills: 0 | Status: SHIPPED | OUTPATIENT
Start: 2022-09-02 | End: 2022-09-07

## 2022-09-02 RX ORDER — TRIAMCINOLONE ACETONIDE 1 MG/G
CREAM TOPICAL 2 TIMES DAILY
Qty: 15 G | Refills: 0 | Status: SHIPPED | OUTPATIENT
Start: 2022-09-02 | End: 2022-09-09

## 2022-09-02 ASSESSMENT — ENCOUNTER SYMPTOMS: FEVER: 0

## 2022-09-02 NOTE — PROGRESS NOTES
Assessment & Plan     Rash and nonspecific skin eruption  Likely urticaria, has some features of dermatographia based on her cell phone's picture.  Recommend starting Zyrtec 20 mg daily x5 days.  No evidence of angioedema.  Symptoms not improved, can also use topical Kenalog for pruritus, advised to avoid the face axilla and groin.  Referral to dermatology if not better.  Patient will message through PCP.  - cetirizine (ZYRTEC) 10 MG tablet  Dispense: 10 tablet; Refill: 0  - triamcinolone (KENALOG) 0.1 % external cream  Dispense: 15 g; Refill: 0    Mild recurrent major depression (H)  Recommended follow-up with PCP    Return in about 1 week (around 9/9/2022) for If symptoms do not improve or gets worse..    Rocco Aggarwal MD  [facility] [place] is a 41 year old, presenting for the following health issues:  Urgent Care (Allergic reaction/hives itchy hands which started Tuesday 8/30/22. Pt was taking Benadryl but has not improved the hives.)      HPI     41-year-old female presents to urgent care with concerns of a very itchy rash, has had this in the past.  She is quite sensitive to a lot of medications, has not seen an allergist or dermatologist.  No new exposures, insect bites, change of laundry detergents or soaps.  No travel.  Most recent rash started approximately 3 days ago, taking some Benadryl as she knows this will get worse.  No lip or tongue enlargement difficulty breathing or swallowing.    Review of Systems   Constitutional: Negative for fever.   Skin: Positive for rash.            Objective    /84 (BP Location: Right arm, Patient Position: Sitting, Cuff Size: Adult Regular)   Pulse 97   Temp 97.4  F (36.3  C) (Tympanic)   SpO2 96%   There is no height or weight on file to calculate BMI.  Physical Exam  Vitals reviewed.   Constitutional:       Appearance: She is not ill-appearing.   Cardiovascular:      Rate and Rhythm: Normal rate and regular rhythm.    Pulmonary:      Effort: Pulmonary effort is normal.      Breath sounds: Normal breath sounds.   Skin:     Comments: Erythematous wheals on the left antecubital fossa

## 2022-10-12 ENCOUNTER — LAB (OUTPATIENT)
Dept: LAB | Facility: CLINIC | Age: 42
End: 2022-10-12
Payer: COMMERCIAL

## 2022-10-12 DIAGNOSIS — D25.9 LEIOMYOMA OF UTERUS: Primary | ICD-10-CM

## 2022-10-12 LAB
BASOPHILS # BLD AUTO: 0 10E3/UL (ref 0–0.2)
BASOPHILS NFR BLD AUTO: 0 %
EOSINOPHIL # BLD AUTO: 0.3 10E3/UL (ref 0–0.7)
EOSINOPHIL NFR BLD AUTO: 3 %
ERYTHROCYTE [DISTWIDTH] IN BLOOD BY AUTOMATED COUNT: 13.3 % (ref 10–15)
HCT VFR BLD AUTO: 39.8 % (ref 35–47)
HGB BLD-MCNC: 13.1 G/DL (ref 11.7–15.7)
IMM GRANULOCYTES # BLD: 0 10E3/UL
IMM GRANULOCYTES NFR BLD: 0 %
LYMPHOCYTES # BLD AUTO: 2.8 10E3/UL (ref 0.8–5.3)
LYMPHOCYTES NFR BLD AUTO: 31 %
MCH RBC QN AUTO: 29.4 PG (ref 26.5–33)
MCHC RBC AUTO-ENTMCNC: 32.9 G/DL (ref 31.5–36.5)
MCV RBC AUTO: 89 FL (ref 78–100)
MONOCYTES # BLD AUTO: 0.5 10E3/UL (ref 0–1.3)
MONOCYTES NFR BLD AUTO: 5 %
NEUTROPHILS # BLD AUTO: 5.2 10E3/UL (ref 1.6–8.3)
NEUTROPHILS NFR BLD AUTO: 60 %
PLATELET # BLD AUTO: 336 10E3/UL (ref 150–450)
RBC # BLD AUTO: 4.46 10E6/UL (ref 3.8–5.2)
WBC # BLD AUTO: 8.8 10E3/UL (ref 4–11)

## 2022-10-12 PROCEDURE — 85025 COMPLETE CBC W/AUTO DIFF WBC: CPT

## 2022-10-12 PROCEDURE — 36415 COLL VENOUS BLD VENIPUNCTURE: CPT

## 2022-12-26 DIAGNOSIS — K21.9 GASTROESOPHAGEAL REFLUX DISEASE WITHOUT ESOPHAGITIS: ICD-10-CM

## 2022-12-29 RX ORDER — LANSOPRAZOLE 30 MG/1
30 CAPSULE, DELAYED RELEASE ORAL DAILY
Qty: 90 CAPSULE | Refills: 0 | Status: SHIPPED | OUTPATIENT
Start: 2022-12-29 | End: 2023-01-05

## 2023-01-05 RX ORDER — LANSOPRAZOLE 30 MG/1
30 CAPSULE, DELAYED RELEASE ORAL DAILY
Qty: 90 CAPSULE | Refills: 0 | Status: SHIPPED | OUTPATIENT
Start: 2023-01-05 | End: 2023-02-02

## 2023-02-02 ENCOUNTER — OFFICE VISIT (OUTPATIENT)
Dept: INTERNAL MEDICINE | Facility: CLINIC | Age: 43
End: 2023-02-02
Payer: COMMERCIAL

## 2023-02-02 VITALS
BODY MASS INDEX: 30.32 KG/M2 | TEMPERATURE: 97 F | DIASTOLIC BLOOD PRESSURE: 84 MMHG | OXYGEN SATURATION: 98 % | HEIGHT: 70 IN | WEIGHT: 211.8 LBS | SYSTOLIC BLOOD PRESSURE: 124 MMHG | RESPIRATION RATE: 15 BRPM | HEART RATE: 110 BPM

## 2023-02-02 DIAGNOSIS — M54.50 CHRONIC RIGHT-SIDED LOW BACK PAIN WITHOUT SCIATICA: ICD-10-CM

## 2023-02-02 DIAGNOSIS — K21.9 GASTROESOPHAGEAL REFLUX DISEASE WITHOUT ESOPHAGITIS: ICD-10-CM

## 2023-02-02 DIAGNOSIS — Z00.00 HEALTHCARE MAINTENANCE: Primary | ICD-10-CM

## 2023-02-02 DIAGNOSIS — Z11.4 SCREENING FOR HIV (HUMAN IMMUNODEFICIENCY VIRUS): ICD-10-CM

## 2023-02-02 DIAGNOSIS — G89.29 CHRONIC RIGHT-SIDED LOW BACK PAIN WITHOUT SCIATICA: ICD-10-CM

## 2023-02-02 LAB
ALBUMIN SERPL BCG-MCNC: 4.5 G/DL (ref 3.5–5.2)
ALP SERPL-CCNC: 72 U/L (ref 35–104)
ALT SERPL W P-5'-P-CCNC: 21 U/L (ref 10–35)
ANION GAP SERPL CALCULATED.3IONS-SCNC: 12 MMOL/L (ref 7–15)
AST SERPL W P-5'-P-CCNC: 21 U/L (ref 10–35)
BILIRUB SERPL-MCNC: 0.3 MG/DL
BUN SERPL-MCNC: 12.2 MG/DL (ref 6–20)
CALCIUM SERPL-MCNC: 10.2 MG/DL (ref 8.6–10)
CHLORIDE SERPL-SCNC: 104 MMOL/L (ref 98–107)
CHOLEST SERPL-MCNC: 212 MG/DL
CREAT SERPL-MCNC: 0.68 MG/DL (ref 0.51–0.95)
DEPRECATED HCO3 PLAS-SCNC: 27 MMOL/L (ref 22–29)
ERYTHROCYTE [DISTWIDTH] IN BLOOD BY AUTOMATED COUNT: 13.2 % (ref 10–15)
GFR SERPL CREATININE-BSD FRML MDRD: >90 ML/MIN/1.73M2
GLUCOSE SERPL-MCNC: 101 MG/DL (ref 70–99)
HCT VFR BLD AUTO: 43.3 % (ref 35–47)
HDLC SERPL-MCNC: 51 MG/DL
HGB BLD-MCNC: 14 G/DL (ref 11.7–15.7)
LDLC SERPL CALC-MCNC: 125 MG/DL
MCH RBC QN AUTO: 28.8 PG (ref 26.5–33)
MCHC RBC AUTO-ENTMCNC: 32.3 G/DL (ref 31.5–36.5)
MCV RBC AUTO: 89 FL (ref 78–100)
NONHDLC SERPL-MCNC: 161 MG/DL
PLATELET # BLD AUTO: 344 10E3/UL (ref 150–450)
POTASSIUM SERPL-SCNC: 5.1 MMOL/L (ref 3.4–5.3)
PROT SERPL-MCNC: 7.9 G/DL (ref 6.4–8.3)
RBC # BLD AUTO: 4.86 10E6/UL (ref 3.8–5.2)
SODIUM SERPL-SCNC: 143 MMOL/L (ref 136–145)
TRIGL SERPL-MCNC: 180 MG/DL
TSH SERPL DL<=0.005 MIU/L-ACNC: 3.77 UIU/ML (ref 0.3–4.2)
WBC # BLD AUTO: 7.6 10E3/UL (ref 4–11)

## 2023-02-02 PROCEDURE — 36415 COLL VENOUS BLD VENIPUNCTURE: CPT | Performed by: NURSE PRACTITIONER

## 2023-02-02 PROCEDURE — 99396 PREV VISIT EST AGE 40-64: CPT | Performed by: NURSE PRACTITIONER

## 2023-02-02 PROCEDURE — 80053 COMPREHEN METABOLIC PANEL: CPT | Performed by: NURSE PRACTITIONER

## 2023-02-02 PROCEDURE — 80061 LIPID PANEL: CPT | Performed by: NURSE PRACTITIONER

## 2023-02-02 PROCEDURE — 84443 ASSAY THYROID STIM HORMONE: CPT | Performed by: NURSE PRACTITIONER

## 2023-02-02 PROCEDURE — 85027 COMPLETE CBC AUTOMATED: CPT | Performed by: NURSE PRACTITIONER

## 2023-02-02 PROCEDURE — 87389 HIV-1 AG W/HIV-1&-2 AB AG IA: CPT | Performed by: NURSE PRACTITIONER

## 2023-02-02 RX ORDER — LANSOPRAZOLE 30 MG/1
30 CAPSULE, DELAYED RELEASE ORAL DAILY
Qty: 90 CAPSULE | Refills: 0 | Status: SHIPPED | OUTPATIENT
Start: 2023-02-02 | End: 2023-10-23

## 2023-02-02 ASSESSMENT — ENCOUNTER SYMPTOMS
ARTHRALGIAS: 1
MYALGIAS: 0
HEMATURIA: 0
HEADACHES: 1
CONSTIPATION: 1
NERVOUS/ANXIOUS: 0
FEVER: 0
DIZZINESS: 0
COUGH: 0
HEARTBURN: 1
BREAST MASS: 0
WEAKNESS: 0
NAUSEA: 0
SHORTNESS OF BREATH: 0
DYSURIA: 0
FREQUENCY: 0
ABDOMINAL PAIN: 0
HEMATOCHEZIA: 0
EYE PAIN: 0
PARESTHESIAS: 0
SORE THROAT: 0
DIARRHEA: 0
JOINT SWELLING: 0
PALPITATIONS: 0
CHILLS: 0

## 2023-02-02 ASSESSMENT — PAIN SCALES - GENERAL: PAINLEVEL: NO PAIN (0)

## 2023-02-02 ASSESSMENT — PATIENT HEALTH QUESTIONNAIRE - PHQ9
SUM OF ALL RESPONSES TO PHQ QUESTIONS 1-9: 3
SUM OF ALL RESPONSES TO PHQ QUESTIONS 1-9: 3
10. IF YOU CHECKED OFF ANY PROBLEMS, HOW DIFFICULT HAVE THESE PROBLEMS MADE IT FOR YOU TO DO YOUR WORK, TAKE CARE OF THINGS AT HOME, OR GET ALONG WITH OTHER PEOPLE: NOT DIFFICULT AT ALL

## 2023-02-02 NOTE — PROGRESS NOTES
SUBJECTIVE:   CC: Anahi is an 42 year old who presents for preventive health visit.   Patient has been advised of split billing requirements and indicates understanding: Yes  Healthy Habits:     Getting at least 3 servings of Calcium per day:  Yes    Bi-annual eye exam:  Yes    Dental care twice a year:  Yes    Sleep apnea or symptoms of sleep apnea:  None    Diet:  Regular (no restrictions)    Frequency of exercise:  None    Taking medications regularly:  Yes    Medication side effects:  Not applicable    PHQ-2 Total Score: 0    Additional concerns today:  Yes          5 y h/o R low back pain, requesting PT referral    Today's PHQ-2 Score:   PHQ-2 ( 1999 [employer]) 2/2/2023   Q1: Little interest or pleasure in doing things 0   Q2: Feeling down, depressed or hopeless 0   PHQ-2 Score 0   PHQ-2 Total Score (12-17 Years)- Positive if 3 or more points; Administer PHQ-A if positive -   Q1: Little interest or pleasure in doing things Not at all   Q2: Feeling down, depressed or hopeless Not at all   PHQ-2 Score 0           Social History     Tobacco Use     Smoking status: Never     Smokeless tobacco: Never   Substance Use Topics     Alcohol use: No     If you drink alcohol do you typically have >3 drinks per day or >7 drinks per week? No    Alcohol Use 2/2/2023   Prescreen: >3 drinks/day or >7 drinks/week? No   Prescreen: >3 drinks/day or >7 drinks/week? -       Reviewed orders with patient.  Reviewed health maintenance and updated orders accordingly - Yes  BP Readings from Last 3 Encounters:   02/02/23 124/84   09/02/22 134/84   08/24/22 137/83    Wt Readings from Last 3 Encounters:   02/02/23 96.1 kg (211 lb 12.8 oz)   03/22/22 95.3 kg (210 lb)   02/07/22 96.6 kg (213 lb)                  Patient Active Problem List   Diagnosis     Gastroesophageal reflux disease without esophagitis     Female infertility     Uterine leiomyoma, unspecified location     Mild recurrent major depression (H)     Past Surgical History:    Procedure Laterality Date     GYNECOLOGIC CRYOSURGERY       HIP SURGERY      Congenital hip dysplasia     WISDOM ST GUIDEWIRE       WISDOM TOOTH EXTRACTION         Social History     Tobacco Use     Smoking status: Never     Smokeless tobacco: Never   Substance Use Topics     Alcohol use: No     Family History   Problem Relation Age of Onset     Diabetes Mother      Heart Disease Mother      Hypertension Mother      Hyperlipidemia Mother      Arthritis Father      Depression Maternal Grandmother      Heart Disease Maternal Grandmother      Hypertension Maternal Grandmother      Hyperlipidemia Maternal Grandmother      Depression Maternal Grandfather      Heart Disease Maternal Grandfather      Hypertension Maternal Grandfather      Hyperlipidemia Maternal Grandfather      Cancer Paternal Grandfather      Breast Cancer No family hx of      Colon Cancer No family hx of      Cerebrovascular Disease No family hx of      Ovarian Cancer No family hx of          Current Outpatient Medications   Medication Sig Dispense Refill     Biotin 3 MG TABS        LANsoprazole (PREVACID) 30 MG DR capsule Take 1 capsule (30 mg) by mouth daily 90 capsule 0     Multiple Vitamin (MULTIVITAMIN ADULT PO)        phenylephrine HCl 10 MG TABS  84 tablet      aspirin-acetaminophen-caffeine (EXCEDRIN MIGRAINE) 250-250-65 MG per tablet Take 1 tablet by mouth every 6 hours as needed for headaches (Patient not taking: Reported on 2/2/2023) 80 tablet        Breast Cancer Screening:    Breast CA Risk Assessment (FHS-7) 1/15/2022 1/17/2022 2/10/2022   Do you have a family history of breast, colon, or ovarian cancer? Yes Yes No / Unknown         Mammogram Screening - Offered annual screening and updated Health Maintenance for mutual plan based on risk factor consideration    Pertinent mammograms are reviewed under the imaging tab.    History of abnormal Pap smear: NO - age 30-65 PAP every 5 years with negative HPV co-testing recommended  PAP / HPV  "Latest Ref Rng & Units 10/3/2018   PAP - Negative for squamous intraepithelial lesion or malignancy  Electronically signed by Kim Baum CT (ASCP) on 10/11/2018 at  2:48 PM     HPV16 NEG Negative   HPV18 NEG Negative   HRHPV NEG Negative     Reviewed and updated as needed this visit by clinical staff   Tobacco  Allergies  Meds              Reviewed and updated as needed this visit by Provider                     Review of Systems   Constitutional: Negative for chills and fever.   HENT: Negative for congestion, ear pain, hearing loss and sore throat.    Eyes: Positive for visual disturbance. Negative for pain.   Respiratory: Negative for cough and shortness of breath.    Cardiovascular: Negative for chest pain, palpitations and peripheral edema.   Gastrointestinal: Positive for constipation and heartburn. Negative for abdominal pain, diarrhea, hematochezia and nausea.   Breasts:  Negative for tenderness, breast mass and discharge.   Genitourinary: Negative for dysuria, frequency, genital sores, hematuria, pelvic pain, urgency, vaginal bleeding and vaginal discharge.   Musculoskeletal: Positive for arthralgias. Negative for joint swelling and myalgias.   Skin: Negative for rash.   Neurological: Positive for headaches. Negative for dizziness, weakness and paresthesias.   Psychiatric/Behavioral: Negative for mood changes. The patient is not nervous/anxious.           OBJECTIVE:   /84 (BP Location: Right arm, Patient Position: Sitting, Cuff Size: Adult Regular)   Pulse 110   Temp 97  F (36.1  C) (Tympanic)   Resp 15   Ht 1.765 m (5' 9.5\")   Wt 96.1 kg (211 lb 12.8 oz)   LMP  (LMP Unknown)   SpO2 98%   BMI 30.83 kg/m    Physical Exam  GENERAL: alert, no distress and obese  EYES: Eyes grossly normal to inspection, PERRL and conjunctivae and sclerae normal  NECK: no adenopathy, no asymmetry, masses, or scars and thyroid normal to palpation  RESP: lungs clear to auscultation - no rales, rhonchi or " "wheezes  CV: regular rate and rhythm, normal S1 S2, no S3 or S4, no murmur, click or rub, no peripheral edema and peripheral pulses strong  ABDOMEN: soft, nontender, no hepatosplenomegaly, no masses and bowel sounds normal  MS: no gross musculoskeletal defects noted, no edema  NEURO: Normal strength and tone, mentation intact and speech normal  PSYCH: mentation appears normal, affect normal/bright        ASSESSMENT/PLAN:       ICD-10-CM    1. Healthcare maintenance  Z00.00 CBC with platelets     Comprehensive metabolic panel (BMP + Alb, Alk Phos, ALT, AST, Total. Bili, TP)     Lipid panel reflex to direct LDL Fasting     TSH with free T4 reflex     CBC with platelets     Comprehensive metabolic panel (BMP + Alb, Alk Phos, ALT, AST, Total. Bili, TP)     Lipid panel reflex to direct LDL Fasting     TSH with free T4 reflex      2. Gastroesophageal reflux disease without esophagitis  K21.9 LANsoprazole (PREVACID) 30 MG DR capsule      3. Screening for HIV (human immunodeficiency virus)  Z11.4 HIV Antigen Antibody Combo     HIV Antigen Antibody Combo      4. Chronic right-sided low back pain without sciatica  M54.50 Physical Therapy Referral    G89.29           Patient has been advised of split billing requirements and indicates understanding: Yes      COUNSELING:  Reviewed preventive health counseling, as reflected in patient instructions      BMI:   Estimated body mass index is 30.83 kg/m  as calculated from the following:    Height as of this encounter: 1.765 m (5' 9.5\").    Weight as of this encounter: 96.1 kg (211 lb 12.8 oz).   Weight management plan: Discussed healthy diet and exercise guidelines      She reports that she has never smoked. She has never used smokeless tobacco.      Misti Rodas NP  [facility]  Answers for HPI/ROS submitted by the patient on 2/2/2023  If you checked off any problems, how difficult have these problems made it for you to do your work, take care of " things at home, or get along with other people?: Not difficult at all  PHQ9 TOTAL SCORE: 3

## 2023-02-03 LAB — HIV 1+2 AB+HIV1 P24 AG SERPL QL IA: NONREACTIVE

## 2023-02-07 ENCOUNTER — THERAPY VISIT (OUTPATIENT)
Dept: PHYSICAL THERAPY | Facility: CLINIC | Age: 43
End: 2023-02-07
Attending: NURSE PRACTITIONER
Payer: COMMERCIAL

## 2023-02-07 DIAGNOSIS — M53.3 SACROILIAC JOINT PAIN: ICD-10-CM

## 2023-02-07 DIAGNOSIS — M54.50 CHRONIC RIGHT-SIDED LOW BACK PAIN WITHOUT SCIATICA: ICD-10-CM

## 2023-02-07 DIAGNOSIS — G89.29 CHRONIC RIGHT-SIDED LOW BACK PAIN WITHOUT SCIATICA: ICD-10-CM

## 2023-02-07 PROCEDURE — 97110 THERAPEUTIC EXERCISES: CPT | Mod: GP | Performed by: PHYSICAL THERAPIST

## 2023-02-07 PROCEDURE — 97161 PT EVAL LOW COMPLEX 20 MIN: CPT | Mod: GP | Performed by: PHYSICAL THERAPIST

## 2023-02-07 PROCEDURE — 97530 THERAPEUTIC ACTIVITIES: CPT | Mod: GP | Performed by: PHYSICAL THERAPIST

## 2023-02-07 NOTE — PROGRESS NOTES
Physical Therapy Initial Evaluation  Subjective:  The history is provided by the patient.   Therapist Generated HPI Evaluation  Problem details: Pt reports 5 year history of LBP, hoping to get this taken care of prior to trial of IVF this summer. Localized LBP on R side, near SIJ area. Pt generally denies radiation into leg. Generally irritating factors are lifting, vacuuming, bending, twisting.    Pain ebbs and flows depending on the day.   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   Of note, pt with congential hip dysplasia (had surgery as a child). Also is getting uterine fibroid removed 2/20/23 and has activity restriction for 6 wks afterward..         Type of problem:  Lumbar and sacroiliac.    This is a chronic condition.  Condition occurred with:  Insidious onset.    Patient reports pain:  SI joint right.  Pain is described as aching, sharp and stabbing and is intermittent.  Pain radiates to:  No radiation.   Since onset symptoms are unchanged.  Symptoms are exacerbated by bending, lifting and twisting  and relieved by bracing/immobilizing, analgesics, heat, ice and rest (has used braces in the past ).    Previous treatment includes chiropractic. There was moderate (in past, not recently ) improvement following previous treatment.  Restrictions due to condition include:  Working in normal job without restrictions.  Barriers include:  Lives alone.    Patient Health History  Anahi Hannah being seen for low back pain .     Problem began: 2/2/2023 (MD order).      Pain is reported as 2/10 on pain scale.  General health as reported by patient is good.  Pertinent medical history includes: cancer, menopausal, migraines/headaches and overweight. Other medical history details: fibroid tumor planning for removal 2/20/23.   Red flags:  Severe headaches.     Surgeries include:  Cancer surgery and orthopedic surgery. Other surgery history details: cervix, congenital  hip .        Current occupation is .   Primary job tasks include:  Computer work, prolonged sitting and repetitive tasks.                                    Objective:  System         Lumbar/SI Evaluation  ROM:    AROM Lumbar:   Flexion:            WFL, pain as returns to standing  Ext:                    Pain at R side    Side Bend:        Left:  WFL    Right:  Painful   Rotation:           Left:  WFL    Right:  WFL  Side Glide:        Left:     Right:           Lumbar Myotomes:    T12-L3 (Hip Flex):  Left: 4+    Right: 4-  L2-4 (Quads):  Left:  5-    Right:  5-  L4 (Ankle DF):  Left:  5    Right:  5              Lumbar Palpation:      Tenderness present at Right: PSIS and Gluteus Medius  Functional Tests:  Core strength and proprioception lumbar: trendelenburg with SLS.                                              Hip Evaluation  Hip PROM:  Hip PROM:  Left Hip:    Normal  Right Hip:  Normal (some pain with mobility )                          Hip Strength:          Adduction:  Right: 4-/5   +  Pain:                               General     ROS    Assessment/Plan:    Patient is a 42 year old female with sacral complaints.    Patient has the following significant findings with corresponding treatment plan.                Diagnosis 1:  Low back pain   Pain -  self management, education and home program  Decreased strength - therapeutic exercise, therapeutic activities and home program  Impaired muscle performance - neuro re-education and home program  Decreased function - therapeutic activities and home program  Impaired posture - neuro re-education and therapeutic activities    Cumulative Therapy Evaluation is: Low complexity.    Previous and current functional limitations:  (See Goal Flow Sheet for this information)    Short term and Long term goals: (See Goal Flow Sheet for this information)     Communication ability:  Patient appears to be able to clearly communicate and understand verbal and written  communication and follow directions correctly.  Treatment Explanation - The following has been discussed with the patient:   RX ordered/plan of care  Anticipated outcomes  Possible risks and side effects  This patient would benefit from PT intervention to resume normal activities.   Rehab potential is good.    Frequency:  1 X week, once daily  Duration:  for 8 weeks  Discharge Plan:  Achieve all LTG.  Independent in home treatment program.  Reach maximal therapeutic benefit.    Please refer to the daily flowsheet for treatment today, total treatment time and time spent performing 1:1 timed codes.

## 2023-02-14 ENCOUNTER — THERAPY VISIT (OUTPATIENT)
Dept: PHYSICAL THERAPY | Facility: CLINIC | Age: 43
End: 2023-02-14
Attending: NURSE PRACTITIONER
Payer: COMMERCIAL

## 2023-02-14 DIAGNOSIS — M53.3 SACROILIAC JOINT PAIN: Primary | ICD-10-CM

## 2023-02-14 PROCEDURE — 97110 THERAPEUTIC EXERCISES: CPT | Mod: GP | Performed by: PHYSICAL THERAPIST

## 2023-03-03 ENCOUNTER — HOSPITAL ENCOUNTER (OUTPATIENT)
Dept: MAMMOGRAPHY | Facility: CLINIC | Age: 43
Discharge: HOME | End: 2023-03-03
Admitting: OBSTETRICS & GYNECOLOGY
Payer: COMMERCIAL

## 2023-03-03 DIAGNOSIS — Z12.31 VISIT FOR SCREENING MAMMOGRAM: ICD-10-CM

## 2023-03-03 PROCEDURE — 77067 SCR MAMMO BI INCL CAD: CPT

## 2023-04-05 ENCOUNTER — THERAPY VISIT (OUTPATIENT)
Dept: PHYSICAL THERAPY | Facility: CLINIC | Age: 43
End: 2023-04-05
Payer: COMMERCIAL

## 2023-04-05 DIAGNOSIS — M53.3 SACROILIAC JOINT PAIN: Primary | ICD-10-CM

## 2023-04-05 PROCEDURE — 97110 THERAPEUTIC EXERCISES: CPT | Mod: GP | Performed by: PHYSICAL THERAPIST

## 2023-05-12 PROBLEM — M53.3 SACROILIAC JOINT PAIN: Status: RESOLVED | Noted: 2023-02-07 | Resolved: 2023-05-12

## 2023-05-12 NOTE — PROGRESS NOTES
DISCHARGE SUMMARY    Anahi Hannah was seen 3 times for evaluation and treatment.  Patient did not return for further treatment and current status is unknown.  Due to short treatment duration, no objective or functional changes were made.  Please see goal flow sheet from episode noted date below and initial evaluation for further information.  Patient is discharged from therapy and therapy episode is resolved as of 05/12/23.      Linked Episodes   Type: Episode: Status: Noted: Resolved: Last update: Updated by:   PHYSICAL THERAPY R LBP 2/7/23 Active 2/7/2023 4/5/2023  7:49 AM Polly Ornelas, PT      Comments:

## 2023-05-15 ENCOUNTER — THERAPY VISIT (OUTPATIENT)
Dept: PHYSICAL THERAPY | Facility: CLINIC | Age: 43
End: 2023-05-15
Payer: COMMERCIAL

## 2023-05-15 DIAGNOSIS — M53.3 SACROILIAC JOINT PAIN: Primary | ICD-10-CM

## 2023-05-15 DIAGNOSIS — G89.29 CHRONIC RIGHT-SIDED LOW BACK PAIN WITHOUT SCIATICA: ICD-10-CM

## 2023-05-15 DIAGNOSIS — M54.50 CHRONIC RIGHT-SIDED LOW BACK PAIN WITHOUT SCIATICA: ICD-10-CM

## 2023-05-15 PROCEDURE — 97110 THERAPEUTIC EXERCISES: CPT | Mod: GP | Performed by: PHYSICAL THERAPIST

## 2023-05-15 NOTE — PROGRESS NOTES
PROGRESS  REPORT    Progress reporting period is from eval to 5/15/23.       SUBJECTIVE  Subjective changes noted by patient:  .  Subjective: Pt returns to therapy in c/o left LBP.  Pt report having intermittent sx's for the past 5 years.  She is unable to discern what positions increase her sx's today.  She feels like HEP is helpful.     Current pain level is  Current Pain level: 2/10.     Previous pain level was   Initial Pain level: 2/10.   Changes in function:  Yes (See Goal flowsheet attached for changes in current functional level)  Adverse reaction to treatment or activity: None    OBJECTIVE  Changes noted in objective findings:  Yes,   Objective: LROM: flexion to toes with out sx's , Ext 100%, R and L SBing 100% wihtout sx's     ASSESSMENT/PLAN  Updated problem list and treatment plan: Diagnosis 1:  LBP  Pain -  self management, education, directional preference exercise and home program  Decreased strength - therapeutic exercise and therapeutic activities  Impaired muscle performance - neuro re-education  STG/LTGs have been met or progress has been made towards goals:  Yes (See Goal flow sheet completed today.)  Assessment of Progress: The patient's condition has potential to improve.  Self Management Plans:  Patient has been instructed in a home treatment program.  Patient is independent in a home treatment program.  I have re-evaluated this patient and find that the nature, scope, duration and intensity of the therapy is appropriate for the medical condition of the patient.      Recommendations:      Please refer to the daily flowsheet for treatment today, total treatment time and time spent performing 1:1 timed codes.

## 2023-06-12 ENCOUNTER — OFFICE VISIT (OUTPATIENT)
Dept: OBGYN | Facility: CLINIC | Age: 43
End: 2023-06-12
Payer: COMMERCIAL

## 2023-06-12 VITALS
DIASTOLIC BLOOD PRESSURE: 70 MMHG | HEIGHT: 60 IN | BODY MASS INDEX: 41.23 KG/M2 | SYSTOLIC BLOOD PRESSURE: 110 MMHG | WEIGHT: 210 LBS

## 2023-06-12 DIAGNOSIS — N95.2 ATROPHIC VAGINITIS: ICD-10-CM

## 2023-06-12 DIAGNOSIS — Z12.4 SCREENING FOR CERVICAL CANCER: Primary | ICD-10-CM

## 2023-06-12 DIAGNOSIS — M62.89 HIGH-TONE PELVIC FLOOR DYSFUNCTION IN FEMALE: ICD-10-CM

## 2023-06-12 PROCEDURE — G0145 SCR C/V CYTO,THINLAYER,RESCR: HCPCS | Performed by: OBSTETRICS & GYNECOLOGY

## 2023-06-12 PROCEDURE — 99213 OFFICE O/P EST LOW 20 MIN: CPT | Mod: 25 | Performed by: OBSTETRICS & GYNECOLOGY

## 2023-06-12 PROCEDURE — 87624 HPV HI-RISK TYP POOLED RSLT: CPT | Performed by: OBSTETRICS & GYNECOLOGY

## 2023-06-12 PROCEDURE — 99396 PREV VISIT EST AGE 40-64: CPT | Performed by: OBSTETRICS & GYNECOLOGY

## 2023-06-12 RX ORDER — ESTRADIOL 0.1 MG/G
1 CREAM VAGINAL
Qty: 42.5 G | Refills: 1 | Status: SHIPPED | OUTPATIENT
Start: 2023-06-12 | End: 2024-07-11

## 2023-06-12 NOTE — PROGRESS NOTES
Anahi is a 42 year old  female who presents for annual exam.     Menses . Postmenopausal     She is currently considering pregnancy. IVF  Besides routine health maintenance,  she would like to discuss menopause and Painful IC.  Currently undergoing the IVF process, she is looking into it with donor eggs.  She has not had menstrual cycle in several years.  Cos Cob is painful, they have not been able to have penetrative vaginal intercourse for about 8 months.  She describes it as a inside the body feeling not necessarily immediately upon penetration, but she does have a difficult time describing the feeling.  They do use a lot of lubrication with intercourse denies abnormal discharge or other vaginal dryness or symptoms.  GYNECOLOGIC HISTORY:  Anahi is sexually active with 1 male partner(s) and is currently in monogamous relationship with .    History sexually transmitted infections:No STD history  STI testing offered?  Declined  History of abnormal Pap smear: NO - age 30-65 PAP every 5 years with negative HPV co-testing recommended  Family history of breast CA: No  Family history of uterine/ovarian CA: No      HEALTH MAINTENANCE:  Mammo: 3/2023 Negaative   History of abnormal Mammo: No.  TSH:   TSH   Date Value Ref Range Status   2023 3.77 0.30 - 4.20 uIU/mL Final   2022 3.48 0.40 - 4.00 mU/L Final      Pap; 2018 Nil, Neg HPV  PHQ2:       2023    11:54 AM 2023     7:46 AM   PHQ-2 (  [employer])   Q1: Little interest or pleasure in doing things 0 0   Q2: Feeling down, depressed or hopeless 0 0   PHQ-2 Score 0 0   Q1: Little interest or pleasure in doing things  Not at all   Q2: Feeling down, depressed or hopeless  Not at all   PHQ-2 Score  0         HISTORY:  OB History    Para Term  AB Living   0 0 0 0 0 0   SAB IAB Ectopic Multiple Live Births   0 0 0 0 0     Past Medical History:   Diagnosis Date     Crushing injury of ankle and foot      Gastroesophageal reflux  disease without esophagitis      History of colposcopy with cervical biopsy     pos high risk HPV     Pneumonia of right lower lobe due to infectious organism      Tension headache      Past Surgical History:   Procedure Laterality Date     GYNECOLOGIC CRYOSURGERY       HIP SURGERY      Congenital hip dysplasia     WISDOM ST GUIDEWIRE       WISDOM TOOTH EXTRACTION       Family History   Problem Relation Age of Onset     Diabetes Mother      Heart Disease Mother      Hypertension Mother      Hyperlipidemia Mother      Arthritis Father      Depression Maternal Grandmother      Heart Disease Maternal Grandmother      Hypertension Maternal Grandmother      Hyperlipidemia Maternal Grandmother      Depression Maternal Grandfather      Heart Disease Maternal Grandfather      Hypertension Maternal Grandfather      Hyperlipidemia Maternal Grandfather      Cancer Paternal Grandfather      Breast Cancer No family hx of      Colon Cancer No family hx of      Cerebrovascular Disease No family hx of      Ovarian Cancer No family hx of      Social History     Socioeconomic History     Marital status:    Occupational History     Employer: [employer],[address]     Employer: [employer]   Tobacco Use     Smoking status: Never     Smokeless tobacco: Never   Vaping Use     Vaping status: Never Used   Substance and Sexual Activity     Alcohol use: No     Drug use: No     Sexual activity: Yes     Partners: Male       Current Outpatient Medications:      Biotin 3 MG TABS, , Disp: , Rfl:      LANsoprazole (PREVACID) 30 MG DR capsule, Take 1 capsule (30 mg) by mouth daily, Disp: 90 capsule, Rfl: 0     Multiple Vitamin (MULTIVITAMIN ADULT PO), , Disp: , Rfl:      phenylephrine HCl 10 MG TABS, , Disp: 84 tablet, Rfl:      aspirin-acetaminophen-caffeine (EXCEDRIN MIGRAINE) 250-250-65 MG per tablet, Take 1 tablet by mouth every 6 hours as needed for headaches (Patient not taking: Reported on 2/2/2023), Disp: 80 tablet, Rfl:       Allergies   Allergen Reactions     Fluconazole Hives     Hydromorphone Other (See Comments)     Other reaction(s): Hallucinations  Auditory Hallucinations       Egg Yolk Diarrhea     Amoxicillin      Bacitracin-Polymyxin B Hives     Cephalosporins      Pt thinks she might be  allergic to cephalosporins- but not certain?     Hydrocortisone Unknown     Other reaction(s): Edema     Neomycin Sulfate      Pcn [Penicillins]      Polymyxin B      Polymyxin B Sulfate      Dilaudid Cough Anxiety, Dizziness, Nausea and Vomiting and Visual Disturbance       Past medical, surgical, social and family history were reviewed and updated in EPIC.    EXAM:  /70 (BP Location: Right arm, Patient Position: Chair, Cuff Size: Adult Large)   Ht 1.524 m (5')   Wt 95.3 kg (210 lb)   LMP  (LMP Unknown)   Breastfeeding No   BMI 41.01 kg/m     BMI: Body mass index is 41.01 kg/m .  Constitutional: healthy, alert and no distress  Breast: No nodularity, asymmetry or nipple discharge bilaterally.  Gastrointestinal: Abdomen soft, non-tender, non-distended. No masses, organomegaly.  :  Vulva:  No external lesions, normal female hair distribution, no inguinal adenopathy.    Urethra:  Midline, non-tender, well supported, no discharge  Vagina:  Moist, pink, no abnormal discharge, no lesions  Uterus:  Normal size , non-tender, freely mobile  Pelvic floor muscles increased in tone but not overly painful to palpation  Urethra nontender  Mild CMT/feels pressure or discomfort when palpating the cervix  I was not able to replicate her pain which ocurs during intercourse  +atrophic skin of vagina, no ecchymoses, ulcers, or excoriatoins noted  Ovaries:  No masses appreciated, non-tender, mobile  Rectal Exam: deferred  Musculoskeletal: extremities normal  Skin: no suspicious lesions or rashes  Psychiatric: Affect appropriate, cooperative,mentation appears normal/anxious.     COUNSELING:   Reviewed preventive health counseling, as reflected in  patient instructions       Contraception       Family planning   reports that she has never smoked. She has never used smokeless tobacco.    Body mass index is 41.01 kg/m .  Weight management plan: Patient referred to endocrine and/or weight management specialty Patient was referred to their PCP to discuss a diet and exercise plan.  FRAX Risk Assessment    ASSESSMENT:  42 year old female with satisfactory annual exam    1. Screening for cervical cancer  - Pap imaged thin layer screen with HPV - recommended age 30 - 65  - HPV Hold (Lab Only)    2. High-tone pelvic floor dysfunction in female  Reviewed etiologies of dyspareunia.  No specific pain elicited today on exam but she does exhibit high tone pelvic floor muscles.  Recommend pelvic floor PT, this will also be helpful for pregnancy.   - Physical Therapy Referral; Future    3. Atrophic vaginitis  Reviewed estrace cream as another way of alleviating dyspareunia in postmenopausal patients, in case this is contributing.  When she is undergoing IVF and using hormones can likely stop this medication.   - estradiol (ESTRACE) 0.1 MG/GM vaginal cream; Place 1 g vaginally twice a week Can use daily for the first two weeks  Dispense: 42.5 g; Refill: 1     In addition to normal pap/pelvic/breast exam, an additional time was spent discussing dyspareunia, high tone pelvic floor dysfunction, preconception discussion, infertility review, and atrophic vaginitis.   Autumn Boyd MD

## 2023-06-12 NOTE — NURSING NOTE
Chief Complaint   Patient presents with     Gyn Exam     Menopausae and painful IC       Initial /70 (BP Location: Right arm, Patient Position: Chair, Cuff Size: Adult Large)   Ht 1.524 m (5')   Wt 95.3 kg (210 lb)   LMP  (LMP Unknown)   Breastfeeding No   BMI 41.01 kg/m   Estimated body mass index is 41.01 kg/m  as calculated from the following:    Height as of this encounter: 1.524 m (5').    Weight as of this encounter: 95.3 kg (210 lb).  BP completed using cuff size: large    Questioned patient about current smoking habits.  Pt. has never smoked.          The following HM Due: pap smear    Abigail Mcbride CMA

## 2023-06-14 LAB
BKR LAB AP GYN ADEQUACY: NORMAL
BKR LAB AP GYN INTERPRETATION: NORMAL
BKR LAB AP HPV REFLEX: NORMAL
BKR LAB AP PREVIOUS ABNORMAL: NORMAL
PATH REPORT.COMMENTS IMP SPEC: NORMAL
PATH REPORT.COMMENTS IMP SPEC: NORMAL
PATH REPORT.RELEVANT HX SPEC: NORMAL

## 2023-06-16 LAB
HUMAN PAPILLOMA VIRUS 16 DNA: NEGATIVE
HUMAN PAPILLOMA VIRUS 18 DNA: NEGATIVE
HUMAN PAPILLOMA VIRUS FINAL DIAGNOSIS: NORMAL
HUMAN PAPILLOMA VIRUS OTHER HR: NEGATIVE

## 2023-08-02 ENCOUNTER — THERAPY VISIT (OUTPATIENT)
Dept: PHYSICAL THERAPY | Facility: CLINIC | Age: 43
End: 2023-08-02
Attending: OBSTETRICS & GYNECOLOGY
Payer: COMMERCIAL

## 2023-08-02 DIAGNOSIS — M62.89 HIGH-TONE PELVIC FLOOR DYSFUNCTION IN FEMALE: ICD-10-CM

## 2023-08-02 DIAGNOSIS — M99.05 PELVIC SOMATIC DYSFUNCTION: Primary | ICD-10-CM

## 2023-08-02 DIAGNOSIS — N94.10 DYSPAREUNIA IN FEMALE: ICD-10-CM

## 2023-08-02 PROCEDURE — 97161 PT EVAL LOW COMPLEX 20 MIN: CPT | Mod: GP | Performed by: PHYSICAL THERAPIST

## 2023-08-02 PROCEDURE — 97530 THERAPEUTIC ACTIVITIES: CPT | Mod: GP | Performed by: PHYSICAL THERAPIST

## 2023-08-02 PROCEDURE — 97110 THERAPEUTIC EXERCISES: CPT | Mod: 59 | Performed by: PHYSICAL THERAPIST

## 2023-08-02 NOTE — PROGRESS NOTES
PHYSICAL THERAPY EVALUATION  Type of Visit: Evaluation    See electronic medical record for Abuse and Falls Screening details.    Subjective       Presenting condition or subjective complaint: Referral by obgyn - premature menopause - weak pelvic muscles due to large fibroid.  Last 2 years, experiencing pain with intercourse.  Has ceased activity for last 8-9 months due to pain.  Pt has not had her period for last 4 years so early menopause.  Pt diagnosed with large fibroid size of a small watermelon early this year and felt a lot of pressure and heaviness in her pelvic region.   Had myomectomy on 2/20/2023.  Currently also going to try IVF and is on synthoid.  Date of onset: 06/12/23 (MD order date)    Relevant medical history: Depression; Menopause; Overweight   Dates & types of surgery: 2/20/23 myomectomy for large fibroid    Prior diagnostic imaging/testing results: MRI     Prior therapy history for the same diagnosis, illness or injury: No      Prior Level of Function  Transfers:   Ambulation:   ADL:   IADL:     Living Environment  Social support: With a significant other or spouse   Type of home: House   Stairs to enter the home: No       Ramp: No   Stairs inside the home: Yes 1 Is there a railing: No   Help at home: None  Equipment owned:       Employment: Yes   Hobbies/Interests: Art, movies, music, pilates, animals    Patient goals for therapy: Have intercourse with my  without intense pain    Pain assessment:  with intercourse 7/10 pain     Objective      PELVIC EVALUATION  ADDITIONAL HISTORY:  Sex assigned at birth: Female  Gender identity: Female    Pronouns: She/Her Hers      Bladder History:  Feels bladder filling: Yes - does feel an urge to void  Triggers for feeling of inability to wait to go to the bathroom: No    How long can you wait to urinate: Probably too long. I can hold it awhile.  Gets up at night to urinate: No    Can stop the flow of urine when urinating:  Yes  Volume of urine usually released: Average   Other issues:    Number of bladder infections in last 12 months:    Fluid intake per day: 40-60, trying to 100      Medications taken for bladder: No     Activities causing urine leak: Cough    Amount of urine typically leaked: Very little  Pads used to help with leaking: No        Bowel History:  Frequency of bowel movement: Usually once a day.  Does feel that she may have IBS, has not been officially diagnosed.    Consistency of stool: Soft-formed    Ignores the urge to defecate: No  Other bowel issues:    Length of time spent trying to have a bowel movement:      Sexual Function History:  Sexual orientation: Bisexual    Sexually active: Yes  Lubrication used: Yes Yes  Pelvic pain: Deep penetration (rectal or vaginal); Pelvic exams    Pain or difficulty with orgasms/erection/ejaculation: Yes    State of menopause: Post-menopause (I am done with menopause)  Hormone medications: Yes Synthiod but will be taking progesterone and estrogen soon for ivf    Are you currently pregnant: No, Number of previous pregnancies: None, Number of deliveries: None, Have you been diagnosed with pelvic prolapse or abdominal separation: No, Do you get regular exercise: No, Have you tried pelvic floor strengthening exercises for 4 weeks: No, Do you have any history of trauma that is relevant to your care that you d like to share: No    Discussed reason for referral regarding pelvic health needs and external/internal pelvic floor muscle examination with patient/guardian.  Opportunity provided to ask questions and verbal consent for assessment and intervention was given.    PAIN:   POSTURE: WNL  LUMBAR SCREEN: AROM WNL  HIP SCREEN:  Strength: WNL , decreased flexibility in piriformis and adductors and hamstrings bilaterally  Functional Strength Testing: Single Leg Squat: Good technique/no significant findings    PELVIC/SI SCREEN:     PAIN PROVOCATION TEST:   PELVIS/SI SPECIAL TESTS:    BREATHING SYMMETRY: WNL    PELVIC EXAM  External Visual Inspection:  At rest: Elevated perineal body  With voluntary pelvic floor contraction: Present  Relaxation of PFM: Partial/delayed relaxation    Integumentary:   Introitus:     External Digital Palpation per Perineum:   Ischiocavernosis: Unremarkable  Bulbo cavernosis: Unremarkable  Transverse perineal: Unremarkable  Levator ani: Tenderness, 7/10 internal palpation bilaterally  Perineal body: Unremarkable    Scar:   Location/Type:   Mobility:     Internal Digital Palpation:  Per Vagina:  Tenderness  Digital Muscle Performance: P (Power): 3/5  Relaxation Post-Contraction: Partial/delayed relaxation    Per Rectum:        Pelvic Organ Prolapse:       ABDOMINAL ASSESSMENT  Diastasis Rectus Abdominis (EDUIN):  EDUIN presence: No    Abdominal Activation/Strength:     Scar:   Location/Type:   Mobility:     Fascial Tension/Restriction/Tone:     BIOFEEDBACK:  Position:   Surface Electrodes:     Abdominals:     Perianals:       DERMATOMES:   DTR S:     Assessment & Plan   CLINICAL IMPRESSIONS  Medical Diagnosis: high tone pelvic floor    Treatment Diagnosis: pelvic floor dysfunction/dyspareunia   Impression/Assessment: Patient is a 42 year old female with dyspareunia/pelvic floor dysfunction complaints.  The following significant findings have been identified: Pain, Decreased ROM/flexibility, and Impaired muscle performance. These impairments interfere with their ability to perform self care tasks and recreational activities as compared to previous level of function.     Clinical Decision Making (Complexity):  Clinical Presentation: Stable/Uncomplicated  Clinical Presentation Rationale: based on medical and personal factors listed in PT evaluation  Clinical Decision Making (Complexity): Low complexity    PLAN OF CARE  Treatment Interventions:  Modalities: Biofeedback  Interventions: Manual Therapy, Neuromuscular Re-education, Therapeutic Activity, Therapeutic Exercise,  Self-Care/Home Management    Long Term Goals     PT Goal 1  Goal Identifier: pain  Goal Description: Pt will report decrease pain with intercourse from 7/10 to 1/10 pain  Rationale: to maximize safety and independence with self cares  Goal Progress: current pain 7/10, has not had intercourse for 8 months  Target Date: 10/01/23      Frequency of Treatment: 1 x a week  Duration of Treatment: 3 weeks tapering to 2 times a month x 2 months    Recommended Referrals to Other Professionals: Physical Therapy  Education Assessment:        Risks and benefits of evaluation/treatment have been explained.   Patient/Family/caregiver agrees with Plan of Care.     Evaluation Time:     PT Eval, Low Complexity Minutes (60233): 20       Signing Clinician: Henri Horn PT

## 2023-08-03 ENCOUNTER — TRANSFERRED RECORDS (OUTPATIENT)
Dept: HEALTH INFORMATION MANAGEMENT | Facility: CLINIC | Age: 43
End: 2023-08-03

## 2023-08-18 ENCOUNTER — THERAPY VISIT (OUTPATIENT)
Dept: PHYSICAL THERAPY | Facility: CLINIC | Age: 43
End: 2023-08-18
Attending: OBSTETRICS & GYNECOLOGY
Payer: COMMERCIAL

## 2023-08-18 DIAGNOSIS — M99.05 PELVIC SOMATIC DYSFUNCTION: Primary | ICD-10-CM

## 2023-08-18 DIAGNOSIS — N94.10 DYSPAREUNIA IN FEMALE: ICD-10-CM

## 2023-08-18 PROCEDURE — 97140 MANUAL THERAPY 1/> REGIONS: CPT | Mod: GP | Performed by: PHYSICAL THERAPIST

## 2023-08-18 PROCEDURE — 97530 THERAPEUTIC ACTIVITIES: CPT | Mod: GP | Performed by: PHYSICAL THERAPIST

## 2023-08-29 ENCOUNTER — THERAPY VISIT (OUTPATIENT)
Dept: PHYSICAL THERAPY | Facility: CLINIC | Age: 43
End: 2023-08-29
Payer: COMMERCIAL

## 2023-08-29 DIAGNOSIS — M99.05 PELVIC SOMATIC DYSFUNCTION: Primary | ICD-10-CM

## 2023-08-29 DIAGNOSIS — N94.10 DYSPAREUNIA IN FEMALE: ICD-10-CM

## 2023-08-29 PROCEDURE — 97140 MANUAL THERAPY 1/> REGIONS: CPT | Mod: GP | Performed by: PHYSICAL THERAPIST

## 2023-08-29 PROCEDURE — 97530 THERAPEUTIC ACTIVITIES: CPT | Mod: GP | Performed by: PHYSICAL THERAPIST

## 2023-09-06 ENCOUNTER — THERAPY VISIT (OUTPATIENT)
Dept: PHYSICAL THERAPY | Facility: CLINIC | Age: 43
End: 2023-09-06
Payer: COMMERCIAL

## 2023-09-06 DIAGNOSIS — M99.05 PELVIC SOMATIC DYSFUNCTION: Primary | ICD-10-CM

## 2023-09-06 DIAGNOSIS — N94.10 DYSPAREUNIA IN FEMALE: ICD-10-CM

## 2023-09-06 PROCEDURE — 97530 THERAPEUTIC ACTIVITIES: CPT | Mod: GP | Performed by: PHYSICAL THERAPIST

## 2023-09-06 NOTE — PROGRESS NOTES
"    DISCHARGE  Reason for Discharge: Patient has met all goals.    Equipment Issued: NA    Discharge Plan: Patient to continue home program.   09/06/23 0500   Appointment Info   Signing clinician's name / credentials Henri Horn PT   Total/Authorized Visits 6 ET   Visits Used 4   Medical Diagnosis high tone pelvic floor   PT Tx Diagnosis pelvic floor dysfunction/dyspareunia   Other pertinent information \"Car-E\"   Quick Adds Pelvic Consent   Progress Note/Certification   Onset of illness/injury or Date of Surgery 06/12/23  (MD order date)   Therapy Frequency 1 x a week   Predicted Duration 3 weeks tapering to 2 times a month x 2 months   Progress Note Completed Date 09/06/23   PT Goal 1   Goal Identifier pain   Goal Description Pt will report decrease pain with intercourse from 7/10 to 1/10 pain   Rationale to maximize safety and independence with self cares   Goal Progress pain 0.5/10 with intercourse   Target Date 10/01/23   Date Met 09/06/23   Subjective Report   Subjective Report Pt reports still feeling mild discomfort with sexual activity, she reports min pain in missionary position with her partner on top but not to the point that had to stop.  Pt reporting 85-90% improvement.  Reports that her and her  did go over the sensate focus handout.  Going through IVF.   Objective Measures   Objective Measures Objective Measure 1   Objective Measure 1   Objective Measure pt expresses understanding of self management and use therawand.   Treatment Interventions (PT)   Interventions Therapeutic Procedure/Exercise;Neuromuscular Re-education;Therapeutic Activity;Manual Therapy   Therapeutic Procedure/Exercise   Ther Proc 1 DP breathing in sitting and supine for PFM awareness and downtraining   PTRx Ther Proc 1 Double Knee to Chest   PTRx Ther Proc 1 - Details HEP   PTRx Ther Proc 2 Pretzel Stretch   PTRx Ther Proc 2 - Details HEP   PTRx Ther Proc 3 All 4s Stretch   PTRx Ther Proc 3 - Details HEP   PTRx Ther Proc " 4 Pelvic Floor Muscle Strengthening Basic   PTRx Ther Proc 4 - Details HEP   Therapeutic Activity   Therapeutic Activities: dynamic activities to improve functional performance minutes (41201) 20   Ther Act 1 - Details verbal rev of use of therawand, OK to do 2 days a week for trigger point relese   Ther Act 2 - Details rev indirect factors that may increase stress and how to manage to increase calm in nervous system   PTRx Ther Act 1 reviewed sensate focus   Comments   Pelvic Health Informed Consent Statement Discussed with patient/guardian reason for referral regarding pelvic health needs and external/internal pelvic floor muscle examination.  Opportunity provided to ask questions and verbal consent for assessment and intervention was given.   Total Session Time   Timed Code Treatment Minutes 20   Total Treatment Time (sum of timed and untimed services) 20         Referring Provider:  Autumn Boyd

## 2023-10-22 ASSESSMENT — PATIENT HEALTH QUESTIONNAIRE - PHQ9
10. IF YOU CHECKED OFF ANY PROBLEMS, HOW DIFFICULT HAVE THESE PROBLEMS MADE IT FOR YOU TO DO YOUR WORK, TAKE CARE OF THINGS AT HOME, OR GET ALONG WITH OTHER PEOPLE: NOT DIFFICULT AT ALL
SUM OF ALL RESPONSES TO PHQ QUESTIONS 1-9: 2
SUM OF ALL RESPONSES TO PHQ QUESTIONS 1-9: 2

## 2023-10-23 ENCOUNTER — OFFICE VISIT (OUTPATIENT)
Dept: FAMILY MEDICINE | Facility: CLINIC | Age: 43
End: 2023-10-23
Payer: COMMERCIAL

## 2023-10-23 ENCOUNTER — LAB (OUTPATIENT)
Dept: FAMILY MEDICINE | Facility: CLINIC | Age: 43
End: 2023-10-23

## 2023-10-23 VITALS
WEIGHT: 213.6 LBS | BODY MASS INDEX: 30.58 KG/M2 | DIASTOLIC BLOOD PRESSURE: 84 MMHG | TEMPERATURE: 97.9 F | SYSTOLIC BLOOD PRESSURE: 114 MMHG | OXYGEN SATURATION: 98 % | HEART RATE: 82 BPM | HEIGHT: 70 IN | RESPIRATION RATE: 16 BRPM

## 2023-10-23 DIAGNOSIS — M79.675 PAIN OF TOE OF LEFT FOOT: ICD-10-CM

## 2023-10-23 DIAGNOSIS — Z23 HIGH PRIORITY FOR 2019-NCOV VACCINE: ICD-10-CM

## 2023-10-23 DIAGNOSIS — Z12.11 SCREEN FOR COLON CANCER: ICD-10-CM

## 2023-10-23 DIAGNOSIS — K21.9 GASTROESOPHAGEAL REFLUX DISEASE WITHOUT ESOPHAGITIS: ICD-10-CM

## 2023-10-23 DIAGNOSIS — Z12.11 SCREEN FOR COLON CANCER: Primary | ICD-10-CM

## 2023-10-23 PROBLEM — F33.0 MILD RECURRENT MAJOR DEPRESSION (H): Status: RESOLVED | Noted: 2022-09-02 | Resolved: 2023-10-23

## 2023-10-23 PROCEDURE — 99213 OFFICE O/P EST LOW 20 MIN: CPT | Performed by: NURSE PRACTITIONER

## 2023-10-23 PROCEDURE — 91320 SARSCV2 VAC 30MCG TRS-SUC IM: CPT | Performed by: NURSE PRACTITIONER

## 2023-10-23 PROCEDURE — 90480 ADMN SARSCOV2 VAC 1/ONLY CMP: CPT | Performed by: NURSE PRACTITIONER

## 2023-10-23 RX ORDER — LANSOPRAZOLE 30 MG/1
30 CAPSULE, DELAYED RELEASE ORAL DAILY
Qty: 90 CAPSULE | Refills: 0 | Status: SHIPPED | OUTPATIENT
Start: 2023-10-23 | End: 2023-10-23

## 2023-10-23 RX ORDER — LANSOPRAZOLE 30 MG/1
30 CAPSULE, DELAYED RELEASE ORAL DAILY
Qty: 90 CAPSULE | Refills: 1 | Status: SHIPPED | OUTPATIENT
Start: 2023-10-23 | End: 2023-10-26

## 2023-10-23 RX ORDER — LEVOTHYROXINE SODIUM 25 UG/1
25 TABLET ORAL DAILY
COMMUNITY
Start: 2023-07-13 | End: 2024-07-27

## 2023-10-23 ASSESSMENT — PAIN SCALES - GENERAL: PAINLEVEL: NO PAIN (0)

## 2023-10-23 NOTE — PROGRESS NOTES
Assessment & Plan     Screen for colon cancer  Prefers stool test  - COLOGUARD([employer]); Future    Gastroesophageal reflux disease without esophagitis  Continue on prevacid; refills sent today.  Reduce known triggers.     High priority for 2019-nCoV vaccine  administered  - COVID-19 12+ (2023-24) (P[employer]ZER)    Pain of toe of left foot  Refer   - Orthopedic  Referral; Future              BING Morales CNP  [facility] ZOË Christian is a 43 year old, presenting for the following health issues:  Gastrophageal Reflux, Referral (Podiatrist ), and Imm/Inj (COVID-19 VACCINE)        10/23/2023     7:25 AM   Additional Questions   Roomed by Lisa Magill, CMA   Accompanied by self         10/23/2023     7:25 AM   Patient Reported Additional Medications   Patient reports taking the following new medications NONE       History of Present Illness       Reason for visit:  Med refill, podiatrist referral?    She eats 0-1 servings of fruits and vegetables daily.She consumes 0 sweetened beverage(s) daily.She exercises with enough effort to increase her heart rate 10 to 19 minutes per day.  She exercises with enough effort to increase her heart rate 3 or less days per week.   She is taking medications regularly.         GERD/Heartburn  Onset/Duration: ongoing   Description: pressure feeling and sometimes burning sensation and also a cough sometimes   Intensity: mild  Progression of Symptoms: improving  Accompanying Signs & Symptoms:  Does it feel like food gets stuck or trouble swallowing: No  Nausea: YES- sometimes   Vomiting (bloody?): No  Abdominal Pain: No  Black-Tarry stools: No  Bloody stools: No  History:  Previous similar episodes: YES  Previous ulcers: No  Precipitating factors:   Caffeine use: YES-decreased  Alcohol use: No  NSAID/Aspirin use: No  Tobacco use: No  Worse with fatty foods, spicy foods, caffeinated drinks, and peppermint.  Alleviating factors: prevacid  "  Therapies tried and outcome:             Lifestyle changes: YES-decreased caffeine             Medications: Prevacid and medication helpful  Taking prevacid every day.  Can miss one day, but if misses more than one day symptoms are back.    Has never had an endoscopy.   Strong family history of GERD.  Knows dietary triggers, but still has symptoms if she is not taking medication regularly.      PODIATRIST REFERRAL:  L second toe pain, chronic.  Saw podiatry 5 years ago told she had a neuroma and given a steroid injection with resolution of symptoms.   Now same pain has recurred.         2/2/2023     7:44 AM 10/22/2023    10:20 AM   PHQ   PHQ-9 Total Score 3 2   Q9: Thoughts of better off dead/self-harm past 2 weeks Not at all Not at all           Review of Systems   Constitutional, HEENT, cardiovascular, pulmonary, gi and gu systems are negative, except as otherwise noted.      Objective    /84 (BP Location: Right arm, Patient Position: Sitting, Cuff Size: Adult Regular)   Pulse 82   Temp 97.9  F (36.6  C) (Oral)   Resp 16   Ht 1.784 m (5' 10.25\")   Wt 96.9 kg (213 lb 9.6 oz)   LMP  (LMP Unknown)   SpO2 98%   BMI 30.43 kg/m    Body mass index is 30.43 kg/m .  Physical Exam   GENERAL: healthy, alert and no distress  NECK: no adenopathy, no asymmetry, masses, or scars and thyroid normal to palpation  RESP: lungs clear to auscultation - no rales, rhonchi or wheezes  CV: regular rate and rhythm, normal S1 S2, no S3 or S4, no murmur, click or rub  MS: no gross musculoskeletal defects noted, no edema  SKIN: no suspicious lesions or rashes    No results found for this or any previous visit (from the past 24 hour(s)).                  "

## 2023-10-25 DIAGNOSIS — K21.9 GASTROESOPHAGEAL REFLUX DISEASE WITHOUT ESOPHAGITIS: ICD-10-CM

## 2023-10-26 RX ORDER — LANSOPRAZOLE 30 MG/1
30 CAPSULE, DELAYED RELEASE ORAL DAILY
Qty: 90 CAPSULE | Refills: 1 | Status: SHIPPED | OUTPATIENT
Start: 2023-10-26

## 2023-10-26 NOTE — TELEPHONE ENCOUNTER
Received call from [employer].    Received prescription on 10/23/23 for lansoprazole 7:02, 7:03 it was cancelled.  Another was sent at 7:04, so [employer] discontinued that as well since the 7:02 prescription was cancelled.  Second prescription sent had refill available.    Resent prescription to [facility] pharmacy.    Vandana Ruiz RN, BSN  [facility]

## 2023-10-30 ENCOUNTER — OFFICE VISIT (OUTPATIENT)
Dept: PODIATRY | Facility: CLINIC | Age: 43
End: 2023-10-30
Attending: NURSE PRACTITIONER
Payer: COMMERCIAL

## 2023-10-30 VITALS
WEIGHT: 213 LBS | SYSTOLIC BLOOD PRESSURE: 118 MMHG | HEIGHT: 70 IN | DIASTOLIC BLOOD PRESSURE: 84 MMHG | BODY MASS INDEX: 30.49 KG/M2

## 2023-10-30 DIAGNOSIS — M79.675 PAIN OF TOE OF LEFT FOOT: ICD-10-CM

## 2023-10-30 DIAGNOSIS — L60.0 ONYCHOCRYPTOSIS: Primary | ICD-10-CM

## 2023-10-30 PROCEDURE — 11719 TRIM NAIL(S) ANY NUMBER: CPT | Performed by: PODIATRIST

## 2023-10-30 PROCEDURE — 99203 OFFICE O/P NEW LOW 30 MIN: CPT | Mod: 25 | Performed by: PODIATRIST

## 2023-10-30 NOTE — PROGRESS NOTES
"Foot & Ankle Surgery  October 30, 2023    CC: \"Sosa/hammertoe pain\"    I was asked to see Anahi Hannah regarding the chief complaint by:  MINOR SMART CNP    HPI:  Pt is a 43 year old female who presents with above complaint.  15-year history of left second toe pain.  She states she previously had an injection 5 years ago for a second interspace neuroma.  She also has issues with the left second hammertoe.  She states multiple family members have had hammertoe issues and hammertoe surgery.  She states the main pain is \"the tip of the toe\" left second toe.  She saw a different provider and previously asked if the nail could be removed and told that it would come back.  This is worse with socks and shoes in a slipper.    ROS:   Pos for CC.  The patient denies current nausea, vomiting, chills, fevers, belly pain, calf pain, chest pain or SOB.  Complete remainder of ROS is otherwise neg.    VITALS:    Vitals:    10/30/23 0826   Weight: 96.6 kg (213 lb)   Height: 1.778 m (5' 10\")       PMH:    Past Medical History:   Diagnosis Date    Crushing injury of ankle and foot     Gastroesophageal reflux disease without esophagitis     History of colposcopy with cervical biopsy     pos high risk HPV    Pneumonia of right lower lobe due to infectious organism     Tension headache        SXHX:    Past Surgical History:   Procedure Laterality Date    GYNECOLOGIC CRYOSURGERY      HIP SURGERY      Congenital hip dysplasia    WISDOM ST GUIDEWIRE      WISDOM TOOTH EXTRACTION          MEDS:    Current Outpatient Medications   Medication    aspirin-acetaminophen-caffeine (EXCEDRIN MIGRAINE) 250-250-65 MG per tablet    Biotin 3 MG TABS    Cholecalciferol 10 MCG (400 UNIT) CAPS    estradiol (ESTRACE) 0.1 MG/GM vaginal cream    LANsoprazole (PREVACID) 30 MG DR capsule    levothyroxine (SYNTHROID/LEVOTHROID) 25 MCG tablet    Multiple Vitamin (MULTIVITAMIN ADULT PO)    phenylephrine HCl 10 MG TABS    VITAMIN E PO     No current " facility-administered medications for this visit.       ALL:     Allergies   Allergen Reactions    Fluconazole Hives    Hydromorphone Other (See Comments)     Other reaction(s): Hallucinations  Auditory Hallucinations      Onion GI Disturbance, Itching, Palpitations, Rash and Shortness Of Breath    Egg Yolk Diarrhea    Amoxicillin     Bacitracin-Polymyxin B Hives    Cephalosporins      Pt thinks she might be  allergic to cephalosporins- but not certain?    Hydrocortisone Unknown     Other reaction(s): Edema    Pcn [Penicillins]     Polymyxin B     Polymyxin B Sulfate     Dilaudid Cough Anxiety, Dizziness, Nausea and Vomiting and Visual Disturbance    Garlic GI Disturbance, Itching and Rash    Neomycin Sulfate Anxiety, Hives, Itching, Palpitations and Rash    Rosemary Oil Anxiety, GI Disturbance, Hives, Itching and Rash       FMH:    Family History   Problem Relation Age of Onset    Diabetes Mother     Heart Disease Mother     Hypertension Mother     Hyperlipidemia Mother     Arthritis Father     Depression Maternal Grandmother     Heart Disease Maternal Grandmother     Hypertension Maternal Grandmother     Hyperlipidemia Maternal Grandmother     Depression Maternal Grandfather     Heart Disease Maternal Grandfather     Hypertension Maternal Grandfather     Hyperlipidemia Maternal Grandfather     Cancer Paternal Grandfather     Breast Cancer No family hx of     Colon Cancer No family hx of     Cerebrovascular Disease No family hx of     Ovarian Cancer No family hx of        SocHx:    Social History     Socioeconomic History    Marital status:      Spouse name: Not on file    Number of children: Not on file    Years of education: Not on file    Highest education level: Not on file   Occupational History     Employer: [employer]     Employer: [employer]   Tobacco Use    Smoking status: Never    Smokeless tobacco: Never   Vaping Use    Vaping Use: Never used   Substance and Sexual Activity     Alcohol use: No    Drug use: No    Sexual activity: Yes     Partners: Male   Other Topics Concern    Parent/sibling w/ CABG, MI or angioplasty before 65F 55M? Not Asked   Social History Narrative    Not on file     Social Determinants of Health     Financial Resource Strain: Low Risk  (10/22/2023)    Financial Resource Strain     Within the past 12 months, have you or your family members you live with been unable to get utilities (heat, electricity) when it was really needed?: No   Food Insecurity: Low Risk  (10/22/2023)    Food Insecurity     Within the past 12 months, did you worry that your food would run out before you got money to buy more?: No     Within the past 12 months, did the food you bought just not last and you didn t have money to get more?: No   Transportation Needs: Low Risk  (10/22/2023)    Transportation Needs     Within the past 12 months, has lack of transportation kept you from medical appointments, getting your medicines, non-medical meetings or appointments, work, or from getting things that you need?: No   Physical Activity: Not on file   Stress: Not on file   Social Connections: Not on file   Interpersonal Safety: Low Risk  (10/23/2023)    Interpersonal Safety     Do you feel physically and emotionally safe where you currently live?: Yes     Within the past 12 months, have you been hit, slapped, kicked or otherwise physically hurt by someone?: No     Within the past 12 months, have you been humiliated or emotionally abused in other ways by your partner or ex-partner?: No   Housing Stability: Low Risk  (10/22/2023)    Housing Stability     Do you have housing? : Yes     Are you worried about losing your housing?: No           EXAMINATION:  Gen:   No apparent distress  Neuro:   A&Ox3, no deficits  Psych:    Answering questions appropriately for age and situation with normal affect  Head:    NCAT  Eye:    Visual scanning without deficit  Ear:    Response to auditory stimuli wnl  Lung:     Non-labored breathing on RA noted  Abd:    NTND per patient report  Lymph:    Neg for pitting/non-pitting edema BLE  Vasc:    Pulses palpable, CFT minimally delayed  Neuro:    Light touch sensation intact to all sensory nerve distributions without paresthesias  Derm:    The main area of pain is at the medial left second toe, where there is onychocryptosis with low-grade paronychia.  No signs of infection are seen  MSK:    Left second hammertoe, reducible at the MPJ, PIPJ and DIPJ.  Calf:    Neg for redness, swelling or tenderness      Assessment:  43 year old female with painful left second ingrown nail, medial edge, in setting of hammertoe      Medical Decision Making/Plan:  Discussed etiologies, anatomy and options  1.  Painful left second ingrown nail, medial edge, in setting of hammertoe  -I personally reviewed and interpreted the patient's lower extremity history pertinent to today's visit, including imaging/labs, in preparation for initiating a treatment program.  -The main area of pain per patient report, and based on the examination, involves the medial ingrown nail.  A slant back was performed today, the leading edge  and removed.  If this helps with symptoms, and she is able to do this herself, no further follow-up or intervention will be needed.  -Soak in warm soapy water/Epsom salts, antibiotic ointment/Band-Aid and ibuprofen as needed  -I encouraged her to make a 30-minute appointment in 2 weeks.  She is okay to cancel if the slant back helps provide relief.  If not, we would consider a partial permanent removal.  -With respect to the hammertoe, our hammertoe handout was dispensed.  Encouraged her to utilize comfortable accommodative shoes and we discussed padding options.  Consider flexor tenotomy    Follow up:  1-2 weeks for nail reassessment or sooner with acute issues      Patient's medical history was reviewed today      Adam Campoverde DPM [facility] FACFAOM  Podiatric Foot & Ankle  Surgeon  [facility]  [phone]    Disclaimer: This note consists of symbols derived from keyboarding, dictation and/or voice recognition software. As a result, there may be errors in the script that have gone undetected. Please consider this when interpreting information found in this chart.

## 2023-10-30 NOTE — LETTER
"    10/30/2023         RE: Anahi Hannah  [address] [facility] [address]        Dear Colleague,    Thank you for referring your patient, Anahi Hannah, to the [facility]. Please see a copy of my visit note below.    Foot & Ankle Surgery  October 30, 2023    CC: \"Sosa/hammertoe pain\"    I was asked to see Anahi Hannah regarding the chief complaint by:  MINOR SMART CNP    HPI:  Pt is a 43 year old female who presents with above complaint.  15-year history of left second toe pain.  She states she previously had an injection 5 years ago for a second interspace neuroma.  She also has issues with the left second hammertoe.  She states multiple family members have had hammertoe issues and hammertoe surgery.  She states the main pain is \"the tip of the toe\" left second toe.  She saw a different provider and previously asked if the nail could be removed and told that it would come back.  This is worse with socks and shoes in a slipper.    ROS:   Pos for CC.  The patient denies current nausea, vomiting, chills, fevers, belly pain, calf pain, chest pain or SOB.  Complete remainder of ROS is otherwise neg.    VITALS:    Vitals:    10/30/23 0826   Weight: 96.6 kg (213 lb)   Height: 1.778 m (5' 10\")       PMH:    Past Medical History:   Diagnosis Date     Crushing injury of ankle and foot      Gastroesophageal reflux disease without esophagitis      History of colposcopy with cervical biopsy     pos high risk HPV     Pneumonia of right lower lobe due to infectious organism      Tension headache        SXHX:    Past Surgical History:   Procedure Laterality Date     GYNECOLOGIC CRYOSURGERY       HIP SURGERY      Congenital hip dysplasia     WISDOM ST GUIDEWIRE       WISDOM TOOTH EXTRACTION          MEDS:    Current Outpatient Medications   Medication     aspirin-acetaminophen-caffeine (EXCEDRIN MIGRAINE) 250-250-65 MG per tablet     Biotin 3 MG TABS     " Cholecalciferol 10 MCG (400 UNIT) CAPS     estradiol (ESTRACE) 0.1 MG/GM vaginal cream     LANsoprazole (PREVACID) 30 MG DR capsule     levothyroxine (SYNTHROID/LEVOTHROID) 25 MCG tablet     Multiple Vitamin (MULTIVITAMIN ADULT PO)     phenylephrine HCl 10 MG TABS     VITAMIN E PO     No current facility-administered medications for this visit.       ALL:     Allergies   Allergen Reactions     Fluconazole Hives     Hydromorphone Other (See Comments)     Other reaction(s): Hallucinations  Auditory Hallucinations       Onion GI Disturbance, Itching, Palpitations, Rash and Shortness Of Breath     Egg Yolk Diarrhea     Amoxicillin      Bacitracin-Polymyxin B Hives     Cephalosporins      Pt thinks she might be  allergic to cephalosporins- but not certain?     Hydrocortisone Unknown     Other reaction(s): Edema     Pcn [Penicillins]      Polymyxin B      Polymyxin B Sulfate      Dilaudid Cough Anxiety, Dizziness, Nausea and Vomiting and Visual Disturbance     Garlic GI Disturbance, Itching and Rash     Neomycin Sulfate Anxiety, Hives, Itching, Palpitations and Rash     Rosemary Oil Anxiety, GI Disturbance, Hives, Itching and Rash       FMH:    Family History   Problem Relation Age of Onset     Diabetes Mother      Heart Disease Mother      Hypertension Mother      Hyperlipidemia Mother      Arthritis Father      Depression Maternal Grandmother      Heart Disease Maternal Grandmother      Hypertension Maternal Grandmother      Hyperlipidemia Maternal Grandmother      Depression Maternal Grandfather      Heart Disease Maternal Grandfather      Hypertension Maternal Grandfather      Hyperlipidemia Maternal Grandfather      Cancer Paternal Grandfather      Breast Cancer No family hx of      Colon Cancer No family hx of      Cerebrovascular Disease No family hx of      Ovarian Cancer No family hx of        SocHx:    Social History     Socioeconomic History     Marital status:      Spouse name: Not on file     Number of  children: Not on file     Years of education: Not on file     Highest education level: Not on file   Occupational History     Employer: [employer]     Employer: [employer]   Tobacco Use     Smoking status: Never     Smokeless tobacco: Never   Vaping Use     Vaping Use: Never used   Substance and Sexual Activity     Alcohol use: No     Drug use: No     Sexual activity: Yes     Partners: Male   Other Topics Concern     Parent/sibling w/ CABG, MI or angioplasty before 65F 55M? Not Asked   Social History Narrative     Not on file     Social Determinants of Health     Financial Resource Strain: Low Risk  (10/22/2023)    Financial Resource Strain      Within the past 12 months, have you or your family members you live with been unable to get utilities (heat, electricity) when it was really needed?: No   Food Insecurity: Low Risk  (10/22/2023)    Food Insecurity      Within the past 12 months, did you worry that your food would run out before you got money to buy more?: No      Within the past 12 months, did the food you bought just not last and you didn t have money to get more?: No   Transportation Needs: Low Risk  (10/22/2023)    Transportation Needs      Within the past 12 months, has lack of transportation kept you from medical appointments, getting your medicines, non-medical meetings or appointments, work, or from getting things that you need?: No   Physical Activity: Not on file   Stress: Not on file   Social Connections: Not on file   Interpersonal Safety: Low Risk  (10/23/2023)    Interpersonal Safety      Do you feel physically and emotionally safe where you currently live?: Yes      Within the past 12 months, have you been hit, slapped, kicked or otherwise physically hurt by someone?: No      Within the past 12 months, have you been humiliated or emotionally abused in other ways by your partner or ex-partner?: No   Housing Stability: Low Risk  (10/22/2023)    Housing Stability      Do you  have housing? : Yes      Are you worried about losing your housing?: No           EXAMINATION:  Gen:   No apparent distress  Neuro:   A&Ox3, no deficits  Psych:    Answering questions appropriately for age and situation with normal affect  Head:    NCAT  Eye:    Visual scanning without deficit  Ear:    Response to auditory stimuli wnl  Lung:    Non-labored breathing on RA noted  Abd:    NTND per patient report  Lymph:    Neg for pitting/non-pitting edema BLE  Vasc:    Pulses palpable, CFT minimally delayed  Neuro:    Light touch sensation intact to all sensory nerve distributions without paresthesias  Derm:    The main area of pain is at the medial left second toe, where there is onychocryptosis with low-grade paronychia.  No signs of infection are seen  MSK:    Left second hammertoe, reducible at the MPJ, PIPJ and DIPJ.  Calf:    Neg for redness, swelling or tenderness      Assessment:  43 year old female with painful left second ingrown nail, medial edge, in setting of hammertoe      Medical Decision Making/Plan:  Discussed etiologies, anatomy and options  1.  Painful left second ingrown nail, medial edge, in setting of hammertoe  -I personally reviewed and interpreted the patient's lower extremity history pertinent to today's visit, including imaging/labs, in preparation for initiating a treatment program.  -The main area of pain per patient report, and based on the examination, involves the medial ingrown nail.  A slant back was performed today, the leading edge  and removed.  If this helps with symptoms, and she is able to do this herself, no further follow-up or intervention will be needed.  -Soak in warm soapy water/Epsom salts, antibiotic ointment/Band-Aid and ibuprofen as needed  -I encouraged her to make a 30-minute appointment in 2 weeks.  She is okay to cancel if the slant back helps provide relief.  If not, we would consider a partial permanent removal.  -With respect to the hammertoe, our  hammertoe handout was dispensed.  Encouraged her to utilize comfortable accommodative shoes and we discussed padding options.  Consider flexor tenotomy    Follow up:  1-2 weeks for nail reassessment or sooner with acute issues      Patient's medical history was reviewed today      Adam Campoverde DPM FACFAS FACFAOM  Podiatric Foot & Ankle Surgeon  [facility]  [phone]    Disclaimer: This note consists of symbols derived from keyboarding, dictation and/or voice recognition software. As a result, there may be errors in the script that have gone undetected. Please consider this when interpreting information found in this chart.          Again, thank you for allowing me to participate in the care of your patient.        Sincerely,        Adam Campoverde DPM, GOYO

## 2023-10-30 NOTE — PATIENT INSTRUCTIONS
Thank you for choosing [facility] Podiatry / Foot & Ankle Surgery!    DR. DUKE'S CLINIC LOCATIONS:     [facility] (Friday) TRIAGE LINE: [phone] [address] [facility]  APPOINTMENTS: [phone]   NIRAV Zepeda [address] RADIOLOGY: [phone]    PHYSICAL THERAPY: [phone]    SET UP SURGERY: [phone]   [place] (Mon-Tues AM-Thurs) BILLING QUESTIONS: [phone] [address] FAX: [phone]   NIRAV Quezada [address] [place] [facility]: [phone]     You were seen today for left second toe pain.  Your examination seems most consistent with an ingrown nail at the medial edge in the setting of a hammertoe.    1.  Ingrown nail -the leading edge of the nail plate was removed in clinic.  If this provides sufficient relief, and you are able to do this yourself, no further follow-up or intervention will be necessary.  You can utilize soaking in warm soapy water or Epsom salts, antibiotic ointment (bacitracin or Neosporin) and a Band-Aid, and ibuprofen for inflammation and discomfort.  If not, we would consider a more formal nail removal procedure.  Follow-up in 1 week for 30-minute appointment if he would like to proceed with a procedure.    2.  Left second hammertoe -appropriate initial therapies for hammertoe include accommodative shoes with a sufficiently wide/tall toe box to minimize shoe gear pressure, as well as padding the area to minimize pressure.  See below for hammertoe information and padding options.    HAMMERTOES  Hammertoe is a contracture (bending) of one or both joints of the second, third, fourth, or fifth (little) toes. This abnormal bending can put pressure on the toe when wearing shoes, causing problems to develop.  Hammertoes usually start out as mild deformities and get progressively worse over time. In the earlier stages, hammertoes are flexible and the symptoms can often be managed with noninvasive measures. But if left untreated, hammertoes can become  more rigid and will not respond to non-surgical treatment.  Because of the progressive nature of hammertoes, they should receive early attention. Hammertoes never get better without some kind of intervention.  CAUSES  The most common cause of hammertoe is a muscle/tendon imbalance. This imbalance, which leads to a bending of the toe, results from mechanical (structural) changes in the foot that occur over time in some people.  Hammertoes may be aggravated by shoes that don t fit properly. A hammertoe may result if a toe is too long and is forced into a cramped position when a tight shoe is worn.  Occasionally, hammertoe is the result of an earlier trauma to the toe. In some people, hammertoes are inherited.  SYMPTOMS  Pain or irritation of the affected toe when wearing shoes.   Corns and calluses (a buildup of skin) on the toe, between two toes, or on the ball of the foot. Corns are caused by constant friction against the shoe. They may be soft or hard, depending upon their location.   Inflammation, redness, or a burning sensation   Contracture of the toe   In more severe cases of hammertoe, open sores may form.   DIAGNOSIS  Although hammertoes are readily apparent, to arrive at a diagnosis the foot and ankle surgeon will obtain a thorough history of your symptoms and examine your foot. During the physical examination, the doctor may attempt to reproduce your symptoms by manipulating your foot and will study the contractures of the toes. In addition, the foot and ankle surgeon may take x-rays to determine the degree of the deformities and assess any changes that may have occurred.   Hammertoes are progressive - they don t go away by themselves and usually they will get worse over time. However, not all cases are alike - some hammertoes progress more rapidly than others. Once your foot and ankle surgeon has evaluated your hammertoes, a treatment plan can be developed that is suited to your needs.  NON-SURGICAL  TREATMENT  There is a variety of treatment options for hammertoe. The treatment your foot and ankle surgeon selects will depend upon the severity of your hammertoe and other factors.  A number of non-surgical measures can be undertaken:  Padding corns and calluses. Your foot and ankle surgeon can provide or prescribe pads designed to shield corns from irritation. If you want to try over-the-counter pads, avoid the medicated types. Medicated pads are generally not recommended because they may contain a small amount of acid that can be harmful. Consult your surgeon about this option.   Changes in shoewear. Avoid shoes with pointed toes, shoes that are too short, or shoes with high heels - conditions that can force your toe against the front of the shoe. Instead, choose comfortable shoes with a deep, roomy toe box and heels no higher than two inches.   Orthotic devices. A custom orthotic device placed in your shoe may help control the muscle/tendon imbalance.   Injection therapy. Corticosteroid injections are sometimes used to ease pain and inflammation caused by hammertoe.   Medications. Oral nonsteroidal anti-inflammatory drugs (NSAIDs), such as ibuprofen, may be recommended to reduce pain and inflammation.   Splinting/strapping. Splints or small straps may be applied by the surgeon to realign the bent toe.   Exercises:   1. The Toe Tap  Stand flat on the ground in your bare feet. Raise all of your toes up off the ground as high as you can. Then starting with the little toes, slowly press them down to the ground. After the big toes are on the ground, start over by raising all of them up off the ground again. This motion is similar to tapping your fingers on a desk. Repeat this ten times.     2. Interlocking your Fingers and Toes  Cross your right foot over your knee and place the fingers of your left hand between your toes. Squeeze your toes together, pinching your fingers between them. Spread the toes apart and  squeeze them together again. Repeat this ten times then do the other foot. Like most exercises, this will get easier the more you do it. If you are having a lot of difficulty with this exercise, start with just your index finger between your first and second toe, then later add your middle finger between your second and third toes, and so on until you can fit all your fingers between your toes. Do this ten times on each foot. Eventually you will be able to spread your toes apart without using your fingers.    3. Gripping the Floor   the floor by pressing the pads of your toes (not the tips) into the floor without curling your toes. Relax and repeat this ten times. If your shoes have the proper amount of depth, you should be able to do this with shoes on.    HAMMERTOE TOE SURGERY   Hammertoe surgery is complex. The surgical procedure is an attempt to help the toe lay in a better position. Nearly every structure in the toe will be cut including the tendons, ligaments, skin and bone. Hammertoes are a complex deformity and final toe position is difficult to predict. The only sure way to position a toe is to fuse all three digital joints. That will not happen as some degree of toe motion is needed for walking. The toe may not be completely reduced as the surrounding skin and other structures may not allow the toe to return to a normal position. The tendons on adjacent toes may need to be cut at the time of the original or subsequent surgeries, as interconnections exist between the toes. The toe may drift after surgery. Stiffness may develop leading to new areas of pressure.   Future shoe choices will be critical in allowing the surgery to provide comfort. The toes will still hurt if shoes rub. The original pain may also persist as other foot problems may be contributing to the current pain. The toe may or may not be pinned in place. External pins would require complete avoidance of water on the foot for six weeks.  The pin would be removed about six weeks after the surgery. Strict attention to protection is critical. The pin could get bumped or loosen resulting in early removal. Removal might be necessary before the bone heals which would negatively affect the final surgical outcome and toe allignment.       www.pedifix.com or call 1-009-PEDIFIX  TOE COVERS/CAPS

## 2023-11-07 LAB — NONINV COLON CA DNA+OCC BLD SCRN STL QL: NEGATIVE

## 2024-01-16 ENCOUNTER — MEDICAL CORRESPONDENCE (OUTPATIENT)
Dept: HEALTH INFORMATION MANAGEMENT | Facility: CLINIC | Age: 44
End: 2024-01-16
Payer: COMMERCIAL

## 2024-01-17 ENCOUNTER — TRANSCRIBE ORDERS (OUTPATIENT)
Dept: MATERNAL FETAL MEDICINE | Facility: CLINIC | Age: 44
End: 2024-01-17
Payer: COMMERCIAL

## 2024-01-17 DIAGNOSIS — Z31.69 ENCOUNTER FOR PRECONCEPTION CONSULTATION: Primary | ICD-10-CM

## 2024-01-18 DIAGNOSIS — O09.529 AMA (ADVANCED MATERNAL AGE) MULTIGRAVIDA 35+: Primary | ICD-10-CM

## 2024-01-18 NOTE — PROGRESS NOTES
Pt called she is requesting consult for AMA. Pt has apt with Dr. Matthews on 2/19/24 and can be scheduled after that. Pt denies any medical problems Pt plans to use frozen donor embryos from 29 year old. Pt declines GC at this time.

## 2024-02-21 ENCOUNTER — OFFICE VISIT (OUTPATIENT)
Dept: FAMILY MEDICINE | Facility: CLINIC | Age: 44
End: 2024-02-21
Payer: COMMERCIAL

## 2024-02-21 DIAGNOSIS — L81.4 LENTIGINES: ICD-10-CM

## 2024-02-21 DIAGNOSIS — Z12.83 ENCOUNTER FOR SCREENING FOR MALIGNANT NEOPLASM OF SKIN: ICD-10-CM

## 2024-02-21 DIAGNOSIS — D18.01 CHERRY ANGIOMA: ICD-10-CM

## 2024-02-21 DIAGNOSIS — L82.0 INFLAMED SEBORRHEIC KERATOSIS: ICD-10-CM

## 2024-02-21 DIAGNOSIS — D22.9 MULTIPLE BENIGN NEVI: Primary | ICD-10-CM

## 2024-02-21 DIAGNOSIS — L65.9 HAIR LOSS: ICD-10-CM

## 2024-02-21 DIAGNOSIS — L82.1 SEBORRHEIC KERATOSES: ICD-10-CM

## 2024-02-21 PROCEDURE — 99203 OFFICE O/P NEW LOW 30 MIN: CPT | Mod: 25 | Performed by: NURSE PRACTITIONER

## 2024-02-21 PROCEDURE — 17110 DESTRUCTION B9 LES UP TO 14: CPT | Performed by: NURSE PRACTITIONER

## 2024-02-21 ASSESSMENT — PAIN SCALES - GENERAL: PAINLEVEL: NO PAIN (0)

## 2024-02-21 NOTE — PROGRESS NOTES
[facility] Dermatology Note  Encounter Date: Feb 21, 2024  Office Visit     Reviewed patients past medical history and pertinent chart review prior to patients visit today.     Dermatology Problem List:  ISK, cryo 2/21/2024     Patient denies personal history of skin cancer or dysplastic nevi.   Patient denies family history of skin cancer or dysplastic nevi.      ____________________________________________    Assessment & Plan:     # Irritated and inflamed Seborrheic Keratosis. Discussed treatment options with patient including no treatment, cryotherapy, and shave removal. Patient prefers cryotherapy today due to irritation and itching. After verbal consent and discussion of risks and benefits including but no limited to dyspigmentation/scar, blister, and pain, 12 was(were) treated with 1-2mm freeze border for 2 cycles with liquid nitrogen. Post cryotherapy instructions were provided.     # Hair loss, favor androgenetic alopecia.  Discussed topical Rogaine but I would like her to run this by her OB/GYN who is giving her IVF as I would like to make sure that this is an acceptable medication to be on during this treatment.  Patient agrees.  If acceptable she will start minoxidil 5% foam or solution 1-2 times daily being careful not to drip onto the face or neck.  Discussed this can cause unwanted hair growth elsewhere especially in the areas that the medication touches.  Change pillowcase often.  We discussed doing labs looking for any abnormalities or deficiencies but patient says that she is being watched quite carefully during her IVF that I discussed and declines labs today.      # Benign skin findings including: seborrheic keratoses, cherry angioma, lentigines and benign nevi.   - No further intervention required. Patient to report changes.   - Patient reassured of the benign nature of these lesions.    #Signs and Symptoms of non-melanoma skin cancer and ABCDEs of melanoma reviewed with  patient. Patient encouraged to perform monthly self skin exams and educated on how to perform them. UV precautions reviewed with patient. Patient was asked about new or changing moles/lesions on body.     #Reviewed Sunscreen: Apply 20 minutes prior to going outdoors and reapply every two hours, when wet or sweating. We recommend using an SPF 30 or higher, and to use one that is water resistant.       Follow-up:  1-2 years for follow up full body skin exam, prn for new or changing lesions or new concerns    Donna Blas CNP  Dermatology     ____________________________________________    CC: Skin Check (Check moles on back, some may be possibly new. Also discuss hair loss)    HPI:  Ms. Anahi Hannah is a(n) 43 year old female who presents today as a new patient for a full body skin cancer screening. Patient has concerns today about some itchy and irritated spots on the neckline and back.  She also has a lot of hair loss.  She says both her mom and dad and grandparents all have significant hair loss so she thinks it is genetic.  She does not have any symptoms but has very thin hair all over especially on the vertex and frontal scalp.  She is going through IVF as she went through early menopause and would like to conceive a child.    Patient is otherwise feeling well, without additional skin concerns.     Physical Exam:  Vitals: LMP  (LMP Unknown)   SKIN: Total skin excluding the genitalia areas was performed. The exam included the head/face, neck, both arms, chest, back, abdomen, both legs, digits, mons pubis, buttock and nails.   -Very thin and sparse growth of hair on the vertex and frontal scalp.  Thin hair throughout the rest of the scalp as well but most significant on the vertex and frontal  -several 1-2mm red dome shaped symmetric papules scattered on the trunk  -multiple tan/brown flat round macules and raised papules scattered throughout trunk, extremities and head. No worrisome features for malignancy  noted on examination.  -scattered tan, homogenous macules scattered on sun exposed areas of trunk, extremities and face.   -scattered waxy, stuck on tan/brown papules and patches on the trunk, neckline, left forehead and right thigh    - No other lesions of concern on areas examined.     Medications:  Current Outpatient Medications   Medication    aspirin-acetaminophen-caffeine (EXCEDRIN MIGRAINE) 250-250-65 MG per tablet    Biotin 3 MG TABS    estradiol (ESTRACE) 0.1 MG/GM vaginal cream    LANsoprazole (PREVACID) 30 MG DR capsule    levothyroxine (SYNTHROID/LEVOTHROID) 25 MCG tablet    Multiple Vitamin (MULTIVITAMIN ADULT PO)    phenylephrine HCl 10 MG TABS    VITAMIN E PO    Cholecalciferol 10 MCG (400 UNIT) CAPS     No current facility-administered medications for this visit.      Past Medical History:   Patient Active Problem List   Diagnosis    Gastroesophageal reflux disease without esophagitis    Female infertility    Uterine leiomyoma, unspecified location    History of colposcopy with cervical biopsy    High-tone pelvic floor dysfunction in female    Pelvic somatic dysfunction    Dyspareunia in female     Past Medical History:   Diagnosis Date    Crushing injury of ankle and foot     Gastroesophageal reflux disease without esophagitis     History of colposcopy with cervical biopsy     pos high risk HPV    Pneumonia of right lower lobe due to infectious organism     Tension headache        CC Referred Self, MD  No address on file on close of this encounter.

## 2024-02-21 NOTE — PATIENT INSTRUCTIONS
Patient Education       Proper skin care from [facility]:    -Eliminate harsh soaps as they strip the natural oils from the skin, often resulting in dry itchy skin ( i.e. Dial, Zest, Pashto Spring)  -Use mild soaps such as Cetaphil or Dove Sensitive Skin in the shower. You do not need to use soap on arms, legs, and trunk every time you shower unless visibly soiled.   -Avoid hot or cold showers.  -After showering, lightly dry off and apply moisturizing within 2-3 minutes. This will help trap moisture in the skin.   -Aggressive use of a moisturizer at least 1-2 times a day to the entire body (including -Vanicream, Cetaphil, Aquaphor or Cerave) and moisturize hands after every washing.  -We recommend using moisturizers that come in a tub that needs to be scooped out, not a pump. This has more of an oil base. It will hold moisture in your skin much better than a water base moisturizer. The above recommended are non-pore clogging.      Wear a sunscreen with at least SPF 30 on your face, ears, neck and V of the chest daily. Wear sunscreen on other areas of the body if those areas are exposed to the sun throughout the day. Sunscreens can contain physical and/or chemical blockers. Physical blockers are less likely to clog pores, these include zinc oxide and titanium dioxide. Reapply every two hour and after swimming.     Sunscreen examples: https://www.ewg.org/sunscreen/    UV radiation  UVA radiation remains constant throughout the day and throughout the year. It is a longer wavelength than UVB and therefore penetrates deeper into the skin leading to immediate and delayed tanning, photoaging, and skin cancer. 70-80% of UVA and UVB radiation occurs between the hours of 10am-2pm.  UVB radiation  UVB radiation causes the most harmful effects and is more significant during the summer months. However, snow and ice can reflect UVB radiation leading to skin damage during the winter months as well. UVB radiation is  responsible for tanning, burning, inflammation, delayed erythema (pinkness), pigmentation (brown spots), and skin cancer.     I recommend self monthly full body exams and yearly full body exams with a dermatology provider. If you develop a new or changing lesion please follow up for examination. Most skin cancers are pink and scaly or pink and pearly. However, we do see blue/brown/black skin cancers.  Consider the ABCDEs of melanoma when giving yourself your monthly full body exam ( don't forget the groin, buttocks, feet, toes, etc). A-asymmetry, B-borders, C-color, D-diameter, E-elevation or evolving. If you see any of these changes please follow up in clinic. If you cannot see your back I recommend purchasing a hand held mirror to use with a larger wall mirror.       Checking for Skin Cancer  You can find cancer early by checking your skin each month. There are 3 kinds of skin cancer. They are melanoma, basal cell carcinoma, and squamous cell carcinoma. Doing monthly skin checks is the best way to find new marks or skin changes. Follow the instructions below for checking your skin.   The ABCDEs of checking moles for melanoma   Check your moles or growths for signs of melanoma using ABCDE:   Asymmetry: the sides of the mole or growth don t match  Border: the edges are ragged, notched, or blurred  Color: the color within the mole or growth varies  Diameter: the mole or growth is larger than 6 mm (size of a pencil eraser)  Evolving: the size, shape, or color of the mole or growth is changing (evolving is not shown in the images below)    Checking for other types of skin cancer  Basal cell carcinoma or squamous cell carcinoma have symptoms such as:     A spot or mole that looks different from all other marks on your skin  Changes in how an area feels, such as itching, tenderness, or pain  Changes in the skin's surface, such as oozing, bleeding, or scaliness  A sore that does not heal  New swelling or redness beyond  the border of a mole    Who s at risk?  Anyone can get skin cancer. But you are at greater risk if you have:   Fair skin, light-colored hair, or light-colored eyes  Many moles or abnormal moles on your skin  A history of sunburns from sunlight or tanning beds  A family history of skin cancer  A history of exposure to radiation or chemicals  A weakened immune system  If you have had skin cancer in the past, you are at risk for recurring skin cancer.   How to check your skin  Do your monthly skin checkups in front of a full-length mirror. Check all parts of your body, including your:   Head (ears, face, neck, and scalp)  Torso (front, back, and sides)  Arms (tops, undersides, upper, and lower armpits)  Hands (palms, backs, and fingers, including under the nails)  Buttocks and genitals  Legs (front, back, and sides)  Feet (tops, soles, toes, including under the nails, and between toes)  If you have a lot of moles, take digital photos of them each month. Make sure to take photos both up close and from a distance. These can help you see if any moles change over time.   Most skin changes are not cancer. But if you see any changes in your skin, call your doctor right away. Only he or she can diagnose a problem. If you have skin cancer, seeing your doctor can be the first step toward getting the treatment that could save your life.   [employer] last reviewed this educational content on 4/1/2019 2000-2020 [employer]. [address] [place] [address], [place] [address]. All rights reserved. This information is not intended as a substitute for professional medical care. Always follow your healthcare professional's instructions.       When should I call my doctor?  If you are worsening or not improving, please, contact us or seek urgent care as noted below.     Who should I call with questions (adults)?  [facility] (adult and pediatric): [phone]  [facility]  [place] (adult): [phone]  [facility] ([place], [place], [place] and [place]) [phone]  For urgent needs outside of business hours call the [facility] at [phone] and ask for the dermatology resident on call to be paged  If this is a medical emergency and you are unable to reach an ER, Call 911      If you need a prescription refill, please contact your pharmacy. Refills are approved or denied by our Physicians during normal business hours, Monday through Fridays  Per office policy, refills will not be granted if you have not been seen within the past year (or sooner depending on your child's condition)

## 2024-02-21 NOTE — LETTER
2/21/2024         RE: Anahi Hannah  [address] [place] [address]        Dear Colleague,    Thank you for referring your patient, Anahi Hannah, to the [facility] [place]. Please see a copy of my visit note below.    [facility] Dermatology Note  Encounter Date: Feb 21, 2024  Office Visit     Reviewed patients past medical history and pertinent chart review prior to patients visit today.     Dermatology Problem List:  ISK, cryo 2/21/2024     Patient denies personal history of skin cancer or dysplastic nevi.   Patient denies family history of skin cancer or dysplastic nevi.      ____________________________________________    Assessment & Plan:     # Irritated and inflamed Seborrheic Keratosis. Discussed treatment options with patient including no treatment, cryotherapy, and shave removal. Patient prefers cryotherapy today due to irritation and itching. After verbal consent and discussion of risks and benefits including but no limited to dyspigmentation/scar, blister, and pain, 12 was(were) treated with 1-2mm freeze border for 2 cycles with liquid nitrogen. Post cryotherapy instructions were provided.     # Hair loss, favor androgenetic alopecia.  Discussed topical Rogaine but I would like her to run this by her OB/GYN who is giving her IVF as I would like to make sure that this is an acceptable medication to be on during this treatment.  Patient agrees.  If acceptable she will start minoxidil 5% foam or solution 1-2 times daily being careful not to drip onto the face or neck.  Discussed this can cause unwanted hair growth elsewhere especially in the areas that the medication touches.  Change pillowcase often.  We discussed doing labs looking for any abnormalities or deficiencies but patient says that she is being watched quite carefully during her IVF that I discussed and declines labs today.      # Benign skin findings including: seborrheic  keratoses, cherry angioma, lentigines and benign nevi.   - No further intervention required. Patient to report changes.   - Patient reassured of the benign nature of these lesions.    #Signs and Symptoms of non-melanoma skin cancer and ABCDEs of melanoma reviewed with patient. Patient encouraged to perform monthly self skin exams and educated on how to perform them. UV precautions reviewed with patient. Patient was asked about new or changing moles/lesions on body.     #Reviewed Sunscreen: Apply 20 minutes prior to going outdoors and reapply every two hours, when wet or sweating. We recommend using an SPF 30 or higher, and to use one that is water resistant.       Follow-up:  1-2 years for follow up full body skin exam, prn for new or changing lesions or new concerns    Donna Blas CNP  Dermatology     ____________________________________________    CC: Skin Check (Check moles on back, some may be possibly new. Also discuss hair loss)    HPI:  Ms. Anahi Hannah is a(n) 43 year old female who presents today as a new patient for a full body skin cancer screening. Patient has concerns today about some itchy and irritated spots on the neckline and back.  She also has a lot of hair loss.  She says both her mom and dad and grandparents all have significant hair loss so she thinks it is genetic.  She does not have any symptoms but has very thin hair all over especially on the vertex and frontal scalp.  She is going through IVF as she went through early menopause and would like to conceive a child.    Patient is otherwise feeling well, without additional skin concerns.     Physical Exam:  Vitals: LMP  (LMP Unknown)   SKIN: Total skin excluding the genitalia areas was performed. The exam included the head/face, neck, both arms, chest, back, abdomen, both legs, digits, mons pubis, buttock and nails.   -Very thin and sparse growth of hair on the vertex and frontal scalp.  Thin hair throughout the rest of the scalp as well  but most significant on the vertex and frontal  -several 1-2mm red dome shaped symmetric papules scattered on the trunk  -multiple tan/brown flat round macules and raised papules scattered throughout trunk, extremities and head. No worrisome features for malignancy noted on examination.  -scattered tan, homogenous macules scattered on sun exposed areas of trunk, extremities and face.   -scattered waxy, stuck on tan/brown papules and patches on the trunk, neckline, left forehead and right thigh    - No other lesions of concern on areas examined.     Medications:  Current Outpatient Medications   Medication     aspirin-acetaminophen-caffeine (EXCEDRIN MIGRAINE) 250-250-65 MG per tablet     Biotin 3 MG TABS     estradiol (ESTRACE) 0.1 MG/GM vaginal cream     LANsoprazole (PREVACID) 30 MG DR capsule     levothyroxine (SYNTHROID/LEVOTHROID) 25 MCG tablet     Multiple Vitamin (MULTIVITAMIN ADULT PO)     phenylephrine HCl 10 MG TABS     VITAMIN E PO     Cholecalciferol 10 MCG (400 UNIT) CAPS     No current facility-administered medications for this visit.      Past Medical History:   Patient Active Problem List   Diagnosis     Gastroesophageal reflux disease without esophagitis     Female infertility     Uterine leiomyoma, unspecified location     History of colposcopy with cervical biopsy     High-tone pelvic floor dysfunction in female     Pelvic somatic dysfunction     Dyspareunia in female     Past Medical History:   Diagnosis Date     Crushing injury of ankle and foot      Gastroesophageal reflux disease without esophagitis      History of colposcopy with cervical biopsy     pos high risk HPV     Pneumonia of right lower lobe due to infectious organism      Tension headache        CC Referred Self, MD  No address on file on close of this encounter.      Again, thank you for allowing me to participate in the care of your patient.        Sincerely,        Fanny Blas, BING CNP

## 2024-03-04 ENCOUNTER — OFFICE VISIT (OUTPATIENT)
Dept: MATERNAL FETAL MEDICINE | Facility: CLINIC | Age: 44
End: 2024-03-04
Attending: ADVANCED PRACTICE MIDWIFE
Payer: COMMERCIAL

## 2024-03-04 ENCOUNTER — OFFICE VISIT (OUTPATIENT)
Dept: MATERNAL FETAL MEDICINE | Facility: CLINIC | Age: 44
End: 2024-03-04
Attending: OBSTETRICS & GYNECOLOGY
Payer: COMMERCIAL

## 2024-03-04 VITALS
HEART RATE: 79 BPM | DIASTOLIC BLOOD PRESSURE: 88 MMHG | OXYGEN SATURATION: 98 % | SYSTOLIC BLOOD PRESSURE: 151 MMHG | RESPIRATION RATE: 18 BRPM

## 2024-03-04 DIAGNOSIS — Z31.69 ENCOUNTER FOR PRECONCEPTION CONSULTATION: Primary | ICD-10-CM

## 2024-03-04 DIAGNOSIS — Z31.69 ENCOUNTER FOR PRECONCEPTION CONSULTATION: ICD-10-CM

## 2024-03-04 PROCEDURE — 99213 OFFICE O/P EST LOW 20 MIN: CPT

## 2024-03-04 PROCEDURE — 96040 HC GENETIC COUNSELING, EACH 30 MINUTES: CPT

## 2024-03-04 PROCEDURE — 99204 OFFICE O/P NEW MOD 45 MIN: CPT | Performed by: ADVANCED PRACTICE MIDWIFE

## 2024-03-04 NOTE — PROGRESS NOTES
"[facility]  Genetic Counseling Consult    Patient:  Anahi Hannah YOB: 1980   Date of Service:  3/04/24   MRN: [id number]    Anahi was seen at the [facility] for preconception genetic consultation. The indication for genetic counseling is  preconception consultation for an IVF pregnancy utilizing an egg donor.   The patient was accompanied to this visit by their partner, Khurram.    The session was conducted in English.      IMPRESSION/ PLAN   During today's [facility] visit, Anahi had a genetic counseling session and a Maternal Fetal Medicine consultation. Please see separate consult documentation.     PREGNANCY HISTORY   /Parity:      MEDICAL HISTORY   Anahi has a history of infertility as well as premature menopause at age 36, which is why Anahi and Khurram have decided to proceed with IVF through an egg donor. Anahi also has a history of seborrheic keratoses, cherry angioma, lentigines, and benign nevi. Anahi also has a history of alopecia. Anahi reports that her mother has severe seborrheic keratoses that covers the majority of her body. She was unsure the age at which her mother's case began to get so severe. We discussed there are genetic conditions that have lentigines as a finding. I discussed that Anahi may or may not have a risk of a genetic condition, however, there would be no way to tell without a genetic evaluation with general genetics. Anahi was not interested in a referral at this time, as a diagnosis would not impact treatment at all or impact a risk assessment to a future pregnancies with an egg donor. Anahi reports that she has a follow-up appointment with her dermatologist in a year, but I encouraged her to reach out to me or her dermatologist is she were to ever want a genetics evaluation.        FAMILY HISTORY   A three-generation pedigree was obtained today and is scanned under the \"Media\" " tab in Epic. The family history was reported by Anahi and their partner.    The following significant findings were reported today:   Anahi's partner, Khurram, is currently 47 years old and healthy. There is a slight increased risk for de antonio single gene disorders in men of advanced paternal age (APA). Due to the increase in DNA mutations in sperm, older men are at higher risk of fathering children with certain autosomal dominant genetic disorders, such as achondroplasia, Apert syndrome, and Lu Verne syndrome. Estimates suggest that the overall risk of a paternal age-related disorder is likely <0.5%. The risk of birth defects, autism spectrum disorder, schizophrenia, and some chromosome disorders may also be minimally increased. However there is currently no clearly accepted definition of advanced paternal age. A frequently used criterion is any man aged 40 years or older at the time of conception. We reviewed that there are limited testing options available for this indication. Ultrasound is useful in screening for birth defects and signs of some APA-related conditions, but not all APA-related conditions would be expected to present with features detectable on a prenatal ultrasound.    Anahi reports that she has one maternal cousin with some type of blood clotting disorder. Anahi was unsure if this condition causes her cousin to clot too much or not enough. Given this history, if is difficult to be able to give a risk assessment, however, if more information is learned about the family history, Anahi is encouraged to contact us to update the history. Given that Anahi and Khurram plan to use an egg donor for a future conception, we would not anticipate this clotting disorder have a risk of being passed down to a future pregnancy.     Anahi also reports that she has a maternal cousin with multiple miscarriages due to known reasons, one of which occurred in the second or third trimester. We know that at least 10-15% of the  recognized pregnancies end in miscarriage, with most losses occurring in the first trimester. Around half of miscarriages that occur before 20 weeks gestation are caused by chromosome abnormalities. The risk for a miscarriage increases with advancing maternal age due to a higher incidence of conceptuses with a chromosomal aneuploidy. Given the future pregnancy will likely be conceived with the help of an egg donor, this family history will likely not impact risk assessment for the pregnancy.     Otherwise, the reported family history is unremarkable for multiple miscarriages, stillbirths, birth defects, intellectual disabilities, known genetic conditions, and consanguinity.       CARRIER SCREENING   Expanded carrier screening is available to screen for autosomal recessive conditions and X-linked conditions in a large list of genes. Autosomal recessive conditions happen when a mutation has been inherited from the egg and sperm and include conditions like cystic fibrosis, thalassemia, hearing loss, spinal muscular atrophy, and more. X-linked conditions happen when a mutation has been inherited from the egg and include conditions like fragile X syndrome. [place] screening was also reviewed. About [place]  Screening    Khurram reports that he had carrier screening through their IVF provider. He was found to be a carrier of cystic fibrosis due to a variant in the CFTR gene. A copy of the report was not available to review today, however, Khurram reports that he was told the genetic variant he carries is associated with a classic form of cystic fibrosis. Khurram reports that he and the selected egg donor are not mutual carriers of any genetic conditions.        RISK ASSESSMENT FOR CHROMOSOME CONDITIONS   We explained that the risk for fetal chromosome abnormalities increases with maternal age. We discussed specific features of common chromosome abnormalities, including Down syndrome, trisomy 13, trisomy 18, and sex  chromosome trisomies. We discussed the following risks for future pregnancies     Anahi and Khurram report that the egg donor they are opting to use had egg retrieval completed around age 28-29. We discussed that the maternal age risks for chromosome conditions is calculated by the maternal age at the time of age retrieval. Current ACOG practice guidelines still recommend non-invasive prenatal testing for IVF pregnancies regardless of PGT-A due to the risk for post-zygotic non-disjunction and risk of preimplantation genetic testing not being uniformly accurate.     At age 28 at delivery, the risk to have a baby with Down syndrome is 1 in 1053.   At age 28 at delivery, the risk to have a baby with any chromosome abnormality is 1 in 435.     At age 29 at delivery, the risk to have a baby with Down syndrome is 1 in 1000.  At age 29 at delivery, the risk to have a baby with any chromosome abnormality is 1 in 417.    At age 30 at delivery, the risk to have a baby with Down syndrome is 1 in 952.  At age 30 at delivery, the risk to have a baby with any chromosome abnormality is 1 in 384.     GENETIC TESTING OPTIONS   We discussed genetic testing during a pregnancy includes screening and diagnostic procedures.     Screening tests are non-invasive which means no risk to the pregnancy and includes ultrasounds and blood work. The benefits and limitations of screening were reviewed. Screening tests provide a risk assessment (chance) specific to the pregnancy for certain fetal chromosome abnormalities but cannot definitively diagnose or exclude a fetal chromosome abnormality. Follow-up genetic counseling and consideration of diagnostic testing is recommended with any abnormal screening result. Diagnostic testing during a pregnancy is more certain and can test for more conditions. However, the tests do have a risk of miscarriage that requires careful consideration. These tests can detect fetal chromosome abnormalities with greater  than 99% certainty. These tests can detect fetal chromosome abnormalities with greater than 99% certainty. Results can be compromised by maternal cell contamination or mosaicism and are limited by the resolution of current genetic testing technology.     There is no screening or diagnostic test that detects all forms of birth defects or intellectual disability.     We discussed the currently available options for future pregnancies. Due to advancements there may be new or different options available at the time of a pregnancy. A new genetic counseling encounter in that pregnancy could review the current options and updates.     We discussed the following screening options:   Non-invasive prenatal testing (NIPT)  Also called cell-free DNA screening because it detects chromosomes from the placenta in the pregnant person's blood  Can be done any time after 10 weeks gestation  Screens for trisomy 21, trisomy 18, trisomy 13, and sex chromosome aneuploidies  Cannot screen for open neural tube defects, maternal serum AFP after 15 weeks is recommended    We discussed the following ultrasound options:  Comprehensive level II ultrasound (Fetal Anatomy Ultrasound)  Ultrasound done between 18-20 weeks gestation  Screens for major birth defects and markers for aneuploidy (like trisomy 21 and trisomy 18)  Includes looking at the fetus/baby's growth, heart, organs (stomach, kidneys), placenta, and amniotic fluid  Fetal Echocardiogram  Ultrasound done between 22-24 weeks gestation  Screen for heart defects  Recommended if there are concerns about the heart or other indications like IVF pregnancy or maternal diabetes    We discussed the following diagnostic options:   Chorionic villus sampling (CVS)  Invasive diagnostic procedure done between 10w0d and 13w6d  The procedure collects a small sample from the placenta for the purpose of chromosomal testing and/or other genetic testing  Diagnostic result; more than 99% sensitivity for  fetal chromosome abnormalities  Cannot screen for open neural tube defects, maternal serum AFP after 15 weeks is recommended  Amniocentesis  Invasive diagnostic procedure done after 15 weeks gestation  The procedure collects a small sample of amniotic fluid for the purpose of chromosomal testing and/or other genetic testing  Diagnostic result; more than 99% sensitivity for fetal chromosome abnormalities  Testing for AFP in the amniotic fluid can test for open neural tube defects      It was a pleasure to be involved with Anahi stearns [facility]. Face-to-face time of the meeting was 30 minutes.    Traci Lawson GC, MS, LGC  Board Certified and [place] Licensed Genetic Counselor   [facility]  Maternal Fetal Medicine  Office: [phone]  [facility]: [phone]   Fax: [phone]  [facility]

## 2024-03-04 NOTE — PROGRESS NOTES
Maternal-Fetal Medicine Consultation    Anahi Hannah  : 1980  MRN: [id number]    REFERRAL:  Anahi Hannah is a 43 year old sent by Dr. Del Cid from [facility] clinic for MFM consultation.    HPI:  Anahi Hannah is a 43 year old  for [facility] consultation regarding AMA and IVF.    She is here with her , Khurram, and is overall feeling well. They are in the process undergoing IVF with an egg donor (28-29 years old) and his sperm. The couple met with our genetic counselors after this visit, please see their documentation for further details. Anahi has a history of early onset menopause and has not had a menstrual cycle for 4-5 years. She also has a history of kidney stones, laparoscopic myomectomy in 2023 at [place] (records available in Care Everywhere), congenital hip dysplasia requiring surgical repair as an infant (no records available), obesity, and anxiety.     She has never been diagnosed with hypertension, however her BP in office today is elevated. Chart review revealed mostly normal BP results, however two values of 130s/80s in August and 2022. She has recently undergone (24) a pre procedural work up for her upcoming IVF transfer with internal medicine. EKG and chest x-ray at that time were normal.      Obstetrics History:  OB History    Para Term  AB Living   0 0 0 0 0 0   SAB IAB Ectopic Multiple Live Births   0 0 0 0 0       Gynecologic History:  - Menstrual history: s/p menopause  - Last Pap: NIL, HPV: neg 2023  - Denies any history of frequent UTIs, vaginal infections, or STIs    Past Medical History:  Past Medical History:   Diagnosis Date    Crushing injury of ankle and foot     Gastroesophageal reflux disease without esophagitis     History of colposcopy with cervical biopsy     pos high risk HPV    Pneumonia of right lower lobe due to infectious organism     Tension headache        Past Surgical History:  Past Surgical  History:   Procedure Laterality Date    GYNECOLOGIC CRYOSURGERY      HIP SURGERY      Congenital hip dysplasia    WISDOM ST GUIDEWIRE      WISDOM TOOTH EXTRACTION         Current Medications:  Prior to Admission medications    Medication Sig Last Dose Taking? Auth Provider Long Term End Date   aspirin-acetaminophen-caffeine (EXCEDRIN MIGRAINE) 250-250-65 MG per tablet Take 1 tablet by mouth every 6 hours as needed for headaches   Doctor, None, MD     Biotin 3 MG TABS    Doctor, None, MD     Cholecalciferol 10 MCG (400 UNIT) CAPS    Reported, Patient     estradiol (ESTRACE) 0.1 MG/GM vaginal cream Place 1 g vaginally twice a week Can use daily for the first two weeks   Autumn Boyd MD     LANsoprazole (PREVACID) 30 MG DR capsule Take 1 capsule by mouth daily.   Eli Smith APRN CNP     levothyroxine (SYNTHROID/LEVOTHROID) 25 MCG tablet Take 25 mcg by mouth daily   Reported, Patient Yes    Multiple Vitamin (MULTIVITAMIN ADULT PO)    Reported, Patient     phenylephrine HCl 10 MG TABS    Doctor, None, MD     VITAMIN E PO    Reported, Patient         Allergies:  Fluconazole, Hydromorphone, Onion, Egg yolk, Amoxicillin, Bacitracin-polymyxin b, Cephalosporins, Hydrocortisone, Pcn [penicillins], Polymyxin b, Polymyxin b sulfate, Dilaudid cough, Garlic, Neomycin sulfate, and Rosemary oil    Social History:   Status:   Denies use of alcohol, drugs or smoking.    ROS:  10-point ROS negative except as in HPI     PHYSICAL EXAM:  Deferred         ASSESSMENT/PLAN:  Anahi Hannah is a 43 year old  here for M consultation regarding: AMA and IVF      Advanced Maternal Age  The risk of fetal aneuploidy increases with age. She is using donor eggs and thus the risk of aneuploidy is specific to the age of the donor. See Genetic counseling visit for more specific risk assessment. Patients of advanced age are also at increased risks of pregnancy complications, including miscarriage, preeclampsia,  abnormalities with placentation, stillbirth, and  delivery.  These risks increase with increasing maternal age and comorbidities.     Women of very advanced maternal age (VAMA) (>=45 years) are at higher risk for several complications, including but not limited to: spontaneous , gestational diabetes mellitus,  delivery at less than 37 and 34 weeks of gestation, placenta previa and accreta, hypertensive complications, and preeclampsia Their newborns were more frequently small for gestational age, and are more likely to have high rates of respiratory distress syndrome,  intensive care admission, growth restriction and / mortality. Pregnancies have >50%  delivery rate. There is also an increased risk of prolonged hospital stay, postpartum hemorrhage, postpartum fever and metabolic complications.    The patient received genetic counseling about diagnostic and screening options, including:  Fetal cell free DNA from maternal serum.  First trimester risk-assessment with nuchal translucency and serum markers (between 11 and 14 weeks).  Chorionic villus sampling.  Amniocentesis.  Second trimester quad screening or integrated screening.    Recommendations:  Targeted anatomical ultrasound at 18-20 weeks.    For women ? 40 years old, we recommend an a growth ultrasound at 32 weeks gestation and  testing usually with weekly BPP (or NST with MVP) beginning at 36 weeks.  Consider induction of labor at 39-40 weeks, but no later than 41 weeks.  If chronic medication is not being used and serial growth is not implemented for other comorbidities, then single growth assessment in the third trimester.  Consider low-dose aspirin (usually 81 mg daily) started in the early second trimester for preeclampsia prevention if additional risk-factors exist.        In Vitro Fertilization Pregnancy  The use of IVF, with and without ICSI, has been associated with an increased risk for  congenital anomalies (specifically cardiac), hypertensive disorders, preeclampsia, placental abruption, placenta previa and other placental abnormalities, small for gestational age and  delivery.     ICSI has also been associated with imprinting disorders, such as Alejandro-Wiedemann and Angelman's Syndromes, however there are no studies confirming these concerns as a causal relationship.     While the association between IVF and these adverse outcomes raise some concern, it is important to note that the literature is contradictory on this subject and the chances of having a healthy child conceived with ART are overall extremely high. We strongly recommend single embryo transfer to reduce the risk of multiple gestation. The use of an egg donor is associated with a further increase in the risk of hypertensive disorders.      Recommendations:  Single embryo transfer is strongly recommended  We recommend offering routine aneuploidy screening regardless of whether PGS is completed  Comprehensive anatomical survey at 18-20 weeks with careful evaluation of the placenta  Fetal echocardiogram at 22 weeks  Fetal growth ultrasound at 32 weeks gestation  Initiation of weekly  testing starting at 36 weeks gestation  Close monitoring for preeclampsia  Fetal testing and timing of delivery per routine obstetric guidelines  Consider low dose aspirin for preeclampsia prophylaxis     Depression/Anxiety  Anahi is not currently taking any medications or following with a therapist for her mood, but does have a strong concern that her anxiety will worsen during pregnancy.    Approximately 10-15% of women of reproductive age are affected by depression and/or anxiety.  Untreated psychiatric disease in pregnancy is serious; approximately 40-50% of untreated patients will have an exacerbation and 15% are at risk for suicidal ideation.  Moreover, untreated mood disorder may increase the risk of stillbirth, IUGR,   delivery, low Apgar scores, sexually transmitted infections, and substance abuse.  A multicenter prospective study monitored 201 women with a history of major depression who were not clinically depressed at the time of conception but were taking antidepressant medication. Approximately two thirds of women who discontinued antidepressants relapsed during their pregnancies compared with 26% of women who relapsed among the women who maintained their therapy. Adjusted analysis reports that the risk of relapse for those that discontinue their medications is 5-6 times higher than those who maintain their dosages.  What is interesting about this study is that over 60% of patients who had previously discontinued their medications restarted them at some point in the pregnancy, indicating a great need to control depressive symptoms in pregnancy. Increasing or reintroduction of medications may attenuate the relapse risk.  In general the risk of uncontrolled psychiatric disease outweighs the risk of medications during pregnancy, and therefore medications should be continued if medically indicated.  There is an extensive literature regarding the risks of medications, particularly SSRIs, in pregnancy.  The literature is quite heterogeneous, with conservative estimates demonstrating a 1.5-2.0-fold overall increased risk of fetal malformations. A case-control study in 9622 infants with major birth defects and 4092 controls found no significant association between use of SSRIs in early pregnancy and birth defects, except for a slightly increased incidence of anencephaly, craniosynostosis and omphalocele.(Luis Carlos DICKSON et al, [facility] 2007)  A similar study comparing 9849 infants who had birth defects with 5860 controls found no significant association between SSRI use in the first trimester and craniosynostosis, omphalocele or heart defects, but analyses of individual SSRIs found that use of sertraline was associated with septal defects and  omphaloceles, and use of paroxetine was associated with right ventricular outflow tract obstruction.(Alia BETTS, [place] 2007) Positive findings can occur by chance in malformation studies when multiple comparisons are made, and in both of these studies the absolute risks of all of these defects were small. Other studies also found an increased risk of cardiac defects associated with paroxetine use in early pregnancy, leading the FDA and [facility] [place] to warn against such use, and the FDA to classify the drug as category D (positive evidence of risk) for use during pregnancy.    SSRI s have also been linked with an increased risk for primary pulmonary hypertension of the , though the absolute risk of this disease is still low. Persistent pulmonary hypertension, which occurs in 2 per 1000 live births, occurs in about one per 100 newborns exposed to an SSRI in the second half of pregnancy, possibly related to serotonin-related effects on cardiovascular development.  The reports of  adaptation (or withdrawal) syndrome have not been clarified to this point.  The risks seem highest with paroxetine (Paxil), which is an SSRI with a short half-life.    Anxiolytic therapy is more controversial, as benzodiazepine administration is known to be addictive, and may cause  withdrawl syndrome.      Recommendations:  Close monitoring of depressive symptoms and follow up with her psychiatrist throughout pregnancy.  We would happy to discuss any questions about changing her medications as they arise.  Targeted ultrasound at 18-20 weeks to assess for congenital fetal abnormalities.  Fetal echocardiogram if the patient was taking paroxetine during organogenesis.  We discussed initiating care with a therapist prior to getting pregnant so she is established with someone should she achieve pregnancy.    Obesity  Pregnancy complications are increased in patients with obesity, even in the absence of additional comorbidity.   These risks include, but are not limited to, gestational diabetes, hypertensive disorders of pregnancy, depression and anxiety, VTE,  birth, failed induction of labor,  delivery, operative delivery, postpartum hemorrhage, postoperative infection, subfertility, miscarriage, congenital anomaly, stillbirth, large for gestational age, shoulder dystocia, postpartum weight retention, and difficulty with breastfeeding.  The risk of antepartum stillbirth increases with increasing BMI and gestational age. Women with BMI ? 30 are less likely than their normal weight counterparts to enter spontaneous labor.  For these reasons, determining the optimal timing of delivery for these patients becomes a balance of decreasing preventable stillbirth risk with minimizing  delivery risk.  This balance is further complicated by the fact that women with BMI ? 30 are more likely to have a failed induction of labor than normal weight women.     Venous thromboembolism in pregnancy is responsible for up to 9-10% of maternal deaths in developed countries, with many of these deaths (> 90%) having an element of preventability. Obesity is certainly a risk factor for DVT throughout pregnancy, increasing risk between 2.5-3 times. Throughout pregnancy, risk for VTE is highest during times of immobility, such as surgery or trauma, and antepartum admission.  Risk is highest after delivery and remains high in the 6 weeks postpartum.  Upon admission to the hospital during pregnancy for non-delivery indications, women should undergo risk assessment for VTE within 24 hours.  Antepartum admission during pregnancy confers an 18 fold risk of VTE over baseline in otherwise low risk women, which approaches the risk of women with a high risk thrombophilia or prior VTE.  Due to this increased risk, VTE prophylaxis is recommended.  For women with BMI ? 30, and for whom admission of at least 72 hours is anticipated, mechanical prophylaxis  while in bed along with maintenance of mobility is encouraged.  Pharmacologic prophylaxis is also recommended in this population.     Obstructive sleep apnea, a serious condition which is tied to metabolic syndrome and its associated conditions, is common in the obstetric population with an incidence of 11-20%.  Pregnant women with a BMI ? 30 are 13 times more likely to be diagnosed with obstructive sleep apnea than their normal weight counterparts.  Although the risks of FRANCISCO are often associated with health problems later in life, multiple studies have shown an increased risk for severe  morbidity and mortality, both maternal and fetal/.  These risks include pre-eclampsia, gestational diabetes, post-operative wound complications, acute renal failure, pulmonary edema, pulmonary emboli (OR 14), peripartum cardiomyopathy (OR 19), stroke, and in hospital death (OR 7), and a trend toward fetal growth restriction.  It has been recommended in the literature to screen all women with BMI ?30 for symptoms of sleep apnea. The pregnancy specific model and STOP questions have reasonable efficacy as screening tools.     Extremely high BMI (BMI > 50) affects stillbirth differently compared with lower BMI.  The rate at which risk for stillbirth increased for overweight and Class I and II obesity are linear from 30-42 weeks gestation.  In contrast, among class III obesity and BMI &gt;50, the risks appear to be nonlinear with time; the risk of stillbirth escalates faster with increasing gestational age in these extreme classes.  These findings remained consistent after adjustment for known confounding risk factors such as chronic hypertension and pre-gestational diabetes.       Recommendations:  Screening for obstructive sleep apnea (FRANCISCO) should be performed. If positive, expedited referral for polysomnography with sleep medicine should be provided.  Early pregnancy screening for undiagnosed type 2 diabetes is  suggested in women who are overweight or have obesity with additional risk factors including physical inactivity, first-degree  Relative with diabetes, high-risk race or ethnicity, previous infant > 4000 g, previous GDM, hypertension, HDL < 35 mg/dL or TG >250 mg/dL, PCOS, A1C > 5.7%, any impaired glucose  tolerance on prior testing, history of CV disease, or evidence of acanthosis nigricans. If within normal limits, GCT should be repeated at 24-28 weeks of gestation.  Advise weight gain of 11-20 pounds during pregnancy. Provide counseling on exercise and nutrition in pregnancy including 20-30 minutes of moderate exercise most days of the week.  If 1 additional moderate risk factor is present, Aspirin 81 mg daily should be implemented to reduce the risk for preeclampsia.  Ultrasound assessment:  BMI ? 40 serial growth ultrasounds at 28 and 34 weeks gestation and initiation of weekly  surveillance starting at 34 weeks gestation  BMI 35.0-39.9 a growth ultrasound can be considered at 32 weeks gestation and initiation of weekly  surveillance starting at 37 weeks gestation  Pharmacologic anticoagulation should be considered in women with BMI ?30 admitted antepartum for an expected stay of 72 hours or greater.  If  delivery occurs, patients should receive pharmacologic anticoagulation within 24 hours of delivery for the course of hospitalization.    History of Myomectomy  When myomectomy involves the muscular portion of the myometrium, delivery by  is typically recommended due to the risk of uterine rupture. Little is known regarding the impact of the location of the leiomyoma (e.g. transmural versus intramural versus serosal and upper versus lower uterine segment), extent of resection (number excised) on uterine rupture risk.     Risks of uterine rupture include maternal hemorrhage and hypovolemic shock, as well as need for transfusion and emergency laparotomy. Importantly, uterine  rupture can occur prior to the onset of labor. After uterine rupture, the fetus is at risk for stillbirth, as well as hypoxia/acidosis and its sequelae. Importantly, uterine rupture in women with prior uterine surgery involving the muscular portion can occur prior to the onset of labor. Early delivery can avert the risk of uterine rupture and its sequelae. While the potential risk for uterine rupture after myomectomy is low, the consequences can be catastrophic. Thus, when  is planned for women with prior myomectomy, the strategy of delivery at 37-38 weeks may be considered, with individualization based on the type and extent of the myomectomy surgery. We also reviewed the risks of late /early term delivery including but not limited to TTN/RDS.    Recommendations:  Serial scans for growth after 28 weeks gestation to assess growth and placentation  Per ACOG Committee Opinion #831 (Medically Indicated Late  and Early Term Deliveries), delivery at [id number] is recommended given history of myomectomy with entry into the cavity via primary  section, unless otherwise clinically indicated.  Prior myomectomy may require earlier delivery similar to prior classical  (30m7h-[id number] weeks of gestation) in situations with more extensive or complicated myomectomy. Furthermore, timing of delivery may be influenced by the degree and location of the prior uterine surgery.    Chronic Hypertension  Currently, Anahi does not carry a diagnosis of hypertension, however given blood pressure reading in clinic today, we did touch on risks associated with hypertension and pregnancy.     The concern with chronic hypertension is that such patients are more likely to develop preeclampsia during the pregnancy, with a risk of 20-50%.  Women with chronic hypertension are at increased risk for early-onset preeclampsia.  Low dose aspirin has been used to lower this risk.  Chronic hypertension also increases the  risk of maternal stroke, pulmonary edema, renal failure, gestational diabetes, iatrogenic  birth, fetal growth restriction, placental abruption and  mortality rate including stillbirth    Without baseline laboratory assessment, it may be difficult to distinguish an exacerbation of hypertension from preeclampsia, especially in the third trimester. We reviewed that it is   generally anticipated that blood pressure will gradually decrease during early pregnancy, reaching at josue at 28-32 weeks, and then slowly rise to pre-pregnancy levels.     We reviewed the goals of antihypertensive therapy in pregnancy, specifically there is new data to suggest that improved blood pressure control during pregnancy reduces the risk of developing superimposed preeclampsia with severe features, abruption,  delivery < 35 weeks, and fetal/ death without an increased risk of fetal growth restriction/small for gestation age infants. Based on these findings it is recommended that women be initiated on medications, prior to 20 weeks, to keep blood pressures < 140/90 mmHg both pre pregnancy and throughout gestation. We generally recommend home blood pressure monitoring to assist with medication titration as well as to monitor her for the development of preeclampsia.  In the event of new elevations in blood pressures after 20 weeks gestation, we would recommend thorough evaluation for preeclampsia prior to medication dose escalation. We reviewed the relative safety of various antihypertensive classes of medications during pregnancy. Labetalol or long-acting nifedipine safe options for blood pressure control in pregnancy.     Recommendations:  Continue/initiate necessary medications and titrate as needed to achieve blood pressure goal  Low dose aspirin for preeclampsia prevention, ideally started between 12-16 weeks  Baseline studies:   Electrocardiogram, if abnormal this should be followed up with an  echocardiogram  Liver function tests, creatinine and blood urea nitrogen, serum electrolytes  Spot protein/creatinine ratio   If the protein/creatine ratio is greater than 0.15 it should be followed up with a 24 hour urine for protein and creatinine  Consider early GCT  Close monitoring evidence of superimposed preeclampsia  More frequent blood pressure monitoring; she should check her blood pressure weekly until 32 weeks at which point she should check it two times per week  Her blood pressure cuff should be titrated in the office  More frequent prenatal visits are indicated with hypertension when medication modifications are necessary  Calling parameters should be clearly laid out with regards to blood pressure and symptoms  Repeat labs as clinically indicated, with a low threshold to rule-out superimposed preeclampsia, especially if it is thought that medication may need to be increased  Comprehensive (level II) anatomic ultrasound  Ultrasounds for growth monthly starting at 28 weeks gestation.   fetal surveillance with weekly BPP (or NST and MVP) starting at 32 weeks depending on severity of maternal disease  Delivery at 38-39 weeks (unless poorly controlled or otherwise indicated sooner)  Early postpartum visit (within the first week) for blood pressure assessment  Postpartum, medication should be adjusted to maintain the systolic blood pressure below 140  mm Hg and the diastolic blood pressure below 90 mm Hg      It was a pleasure to be involved with Anahi's care and if pregnancy is achieved, we would be happy to see her back for any additional consultation or imaging needed for her care.    45 minutes spent on the date of the encounter, doing chart review, history and exam, documentation and further activities as noted.    Maria Del Carmen Combs CNM on 3/4/2024 at 10:25 AM

## 2024-04-03 ENCOUNTER — OFFICE VISIT (OUTPATIENT)
Dept: FAMILY MEDICINE | Facility: CLINIC | Age: 44
End: 2024-04-03
Payer: COMMERCIAL

## 2024-04-03 VITALS
HEIGHT: 69 IN | HEART RATE: 98 BPM | OXYGEN SATURATION: 97 % | SYSTOLIC BLOOD PRESSURE: 128 MMHG | DIASTOLIC BLOOD PRESSURE: 84 MMHG | BODY MASS INDEX: 31.25 KG/M2 | WEIGHT: 211 LBS | TEMPERATURE: 98.2 F | RESPIRATION RATE: 18 BRPM

## 2024-04-03 DIAGNOSIS — R06.2 WHEEZING: ICD-10-CM

## 2024-04-03 DIAGNOSIS — J06.9 UPPER RESPIRATORY TRACT INFECTION, UNSPECIFIED TYPE: ICD-10-CM

## 2024-04-03 DIAGNOSIS — I83.91 VARICOSE VEINS OF RIGHT LOWER EXTREMITY, UNSPECIFIED WHETHER COMPLICATED: Primary | ICD-10-CM

## 2024-04-03 PROCEDURE — 99213 OFFICE O/P EST LOW 20 MIN: CPT

## 2024-04-03 RX ORDER — AZITHROMYCIN 250 MG/1
TABLET, FILM COATED ORAL
Qty: 6 TABLET | Refills: 0 | Status: SHIPPED | OUTPATIENT
Start: 2024-04-03 | End: 2024-04-08

## 2024-04-03 RX ORDER — ALBUTEROL SULFATE 90 UG/1
2 AEROSOL, METERED RESPIRATORY (INHALATION) EVERY 6 HOURS PRN
Qty: 18 G | Refills: 1 | Status: SHIPPED | OUTPATIENT
Start: 2024-04-03

## 2024-04-03 ASSESSMENT — PATIENT HEALTH QUESTIONNAIRE - PHQ9: SUM OF ALL RESPONSES TO PHQ QUESTIONS 1-9: 0

## 2024-04-04 ENCOUNTER — TELEPHONE (OUTPATIENT)
Dept: VASCULAR SURGERY | Facility: CLINIC | Age: 44
End: 2024-04-04
Payer: COMMERCIAL

## 2024-04-04 NOTE — TELEPHONE ENCOUNTER
Leah Schroeder contacted Anahi on 04/04/24 and left a message. If patient calls back please schedule appointment as soon as possible for consult varicose vein concern right calf, spider veins left ankle/reuben.

## 2024-04-15 ENCOUNTER — OFFICE VISIT (OUTPATIENT)
Dept: VASCULAR SURGERY | Facility: CLINIC | Age: 44
End: 2024-04-15
Payer: COMMERCIAL

## 2024-04-15 DIAGNOSIS — I83.91 VARICOSE VEINS OF RIGHT LOWER EXTREMITY, UNSPECIFIED WHETHER COMPLICATED: ICD-10-CM

## 2024-04-15 PROCEDURE — 99203 OFFICE O/P NEW LOW 30 MIN: CPT | Performed by: INTERNAL MEDICINE

## 2024-04-15 NOTE — PROGRESS NOTES
Vein Clinic Consultation Note    HPI:  Anahi Hannah is a 43 year old female who was seen today in consultation for varicose veins right greater than left.  She has family history of varicose veins in her father.  She has prominent right calf varicose veins of unknown origin.  They may be coming from the greater saphenous vein but have a posterolateral component as well.  It is possible it is from the Giacomini vein, she has not had ultrasound.    She comes in as she will have embryo implantation soon and wanted assessment of her varicosities in case they would worsen or be affected by pregnancy.      ROS    PHH:    Past Medical History:   Diagnosis Date    Crushing injury of ankle and foot     Gastroesophageal reflux disease without esophagitis     History of colposcopy with cervical biopsy     pos high risk HPV    Pneumonia of right lower lobe due to infectious organism     Tension headache         Past Surgical History:   Procedure Laterality Date    GYNECOLOGIC CRYOSURGERY      HIP SURGERY      Congenital hip dysplasia    WISDOM ST GUIDEWIRE      WISDOM TOOTH EXTRACTION         ALLERGIES:  Fluconazole, Hydromorphone, Onion, Egg yolk, Amoxicillin, Bacitracin-polymyxin b, Cephalosporins, Hydrocortisone, Pcn [penicillins], Polymyxin b, Polymyxin b sulfate, Dilaudid cough, Garlic, Neomycin sulfate, and Rosemary oil    Allergies   Allergen Reactions    Fluconazole Hives    Hydromorphone Other (See Comments)     Other reaction(s): Hallucinations  Auditory Hallucinations      Onion GI Disturbance, Itching, Palpitations, Rash and Shortness Of Breath    Egg Yolk Diarrhea    Amoxicillin     Bacitracin-Polymyxin B Hives    Cephalosporins      Pt thinks she might be  allergic to cephalosporins- but not certain?    Hydrocortisone Unknown     Other reaction(s): Edema    Pcn [Penicillins]     Polymyxin B     Polymyxin B Sulfate     Dilaudid Cough Anxiety, Dizziness, Nausea and Vomiting and Visual Disturbance     Garlic GI Disturbance, Itching and Rash    Neomycin Sulfate Anxiety, Hives, Itching, Palpitations and Rash    Rosemary Oil Anxiety, GI Disturbance, Hives, Itching and Rash       MEDS:    Current Outpatient Medications:     albuterol (PROAIR HFA/PROVENTIL HFA/VENTOLIN HFA) 108 (90 Base) MCG/ACT inhaler, Inhale 2 puffs into the lungs every 6 hours as needed for shortness of breath, wheezing or cough, Disp: 18 g, Rfl: 1    aspirin-acetaminophen-caffeine (EXCEDRIN MIGRAINE) 250-250-65 MG per tablet, Take 1 tablet by mouth every 6 hours as needed for headaches, Disp: 80 tablet, Rfl:     Biotin 3 MG TABS, , Disp: , Rfl:     estradiol (ESTRACE) 0.1 MG/GM vaginal cream, Place 1 g vaginally twice a week Can use daily for the first two weeks, Disp: 42.5 g, Rfl: 1    LANsoprazole (PREVACID) 30 MG DR capsule, Take 1 capsule by mouth daily., Disp: 90 capsule, Rfl: 1    levothyroxine (SYNTHROID/LEVOTHROID) 25 MCG tablet, Take 25 mcg by mouth daily, Disp: , Rfl:     Multiple Vitamin (MULTIVITAMIN ADULT PO), , Disp: , Rfl:     phenylephrine HCl 10 MG TABS, , Disp: 84 tablet, Rfl:     VITAMIN E PO, , Disp: , Rfl:     SOCIAL HABITS:    History   Smoking Status    Never   Smokeless Tobacco    Never     Social History    Substance and Sexual Activity      Alcohol use: No      History   Drug Use No       FAMILY HISTORY:    Family History   Problem Relation Age of Onset    Diabetes Mother     Heart Disease Mother     Hypertension Mother     Hyperlipidemia Mother     Arthritis Father     Depression Maternal Grandmother     Heart Disease Maternal Grandmother     Hypertension Maternal Grandmother     Hyperlipidemia Maternal Grandmother     Depression Maternal Grandfather     Heart Disease Maternal Grandfather     Hypertension Maternal Grandfather     Hyperlipidemia Maternal Grandfather     Cancer Paternal Grandfather     Breast Cancer No family hx of     Colon Cancer No family hx of     Cerebrovascular Disease No family hx of     Ovarian  Cancer No family hx of          Results for orders placed or performed during the hospital encounter of 03/03/23   MA Screening Digital Bilateral    Narrative    BILATERAL FULL FIELD DIGITAL SCREENING MAMMOGRAM    Performed on: 3/3/23    Compared to: 02/10/2022 and 02/08/2021    Technique: This study was evaluated with the assistance of Computer-Aided   Detection.    Findings: The breasts have scattered areas of fibroglandular density.    There is no radiographic evidence of malignancy.     Impression    IMPRESSION: ACR BI-RADS Category 1: Negative    RECOMMENDED FOLLOW-UP: Annual routine screening mammogram    The results and recommendations of this examination will be communicated   to the patient.        Iain Kelley MD             VEIN CLINIC LEG DRAWING    Patient History  If you have varicose or spider veins/legs, when did they occur?: Other:  Other:: Right leg VV, few on left leg. Symptoms  Please describe your expectations of therapy:: establish care, make sure nothing more serious will occur, doing IVF right now.    Past Treatment:  Have you ever been treated for the above problem(s)? : No  Have you ever worn prescription stockings?: Yes  Do you currently wear compression stockings?: No  Length of time compression stockings worn:: x 3 months, on/off throughout the years  Do you use medications to relieve your leg pain?: No  Do your symptoms, caused by VV or Spider Veins, interfere with work or activities of daily living?: Yes  Please, explain:: Leg cramping keeps her up at night    For Varicose and Spider Veins of the Legs:  Side:: Bilateral  Do you have now:: Fatigue in legs  The pain is made worse with...: -- (toward end of day ? and at night cramping)  The pain is made better with...: Elevation of the leg (muscle cream to help with cramping)    Do you have a history of any of the following?: Family history of varicose/spider veins (family hx - father, grandparents.)  Side:: Left    VCSS  PAIN::  1  Varicose Veins:: 1  Venous Edema:: 0  Skin Pigmentation:: 0  Inflamation:: 0  Induration:: 0  Number of active ulcers:: 0  Active ulcer duration:: 0  Active ulcer diameter:: 0  Compression Therapy:: 0  VCSS Score:: 2    CEAP:  CEAP:: C2         ASSESSMENT: Right calf varicose veins of unknown origin, visible stigmata of left distal vein overpressure as well    PLAN: Venous competency testing, continue compression stockings and clinical follow-up      Grover Cheek MD    30-minute spent today reviewing chart, discussing with patient and postvisit charting.

## 2024-04-15 NOTE — NURSING NOTE
Patient Reported symptoms:    Bilateral leg   Heaviness A little of the time   Achiness A little of the time   Swelling None of the time   Throbbing None of the time   Itching Some of the time   Appearance Moderately noticeable   Impact on work/activities Symptoms but full able to participate

## 2024-04-15 NOTE — LETTER
4/15/2024         RE: Anahi Hannah  [address] [place] [address]        Dear Colleague,    Thank you for referring your patient, Anahi Hannah, to the [facility] [place]. Please see a copy of my visit note below.        Vein Clinic Consultation Note    HPI:  Anahi Hannah is a 43 year old female who was seen today in consultation for varicose veins right greater than left.  She has family history of varicose veins in her father.  She has prominent right calf varicose veins of unknown origin.  They may be coming from the greater saphenous vein but have a posterolateral component as well.  It is possible it is from the Giacomini vein, she has not had ultrasound.    She comes in as she will have embryo implantation soon and wanted assessment of her varicosities in case they would worsen or be affected by pregnancy.      ROS    PHH:    Past Medical History:   Diagnosis Date     Crushing injury of ankle and foot      Gastroesophageal reflux disease without esophagitis      History of colposcopy with cervical biopsy     pos high risk HPV     Pneumonia of right lower lobe due to infectious organism      Tension headache         Past Surgical History:   Procedure Laterality Date     GYNECOLOGIC CRYOSURGERY       HIP SURGERY      Congenital hip dysplasia     WISDOM ST GUIDEWIRE       WISDOM TOOTH EXTRACTION         ALLERGIES:  Fluconazole, Hydromorphone, Onion, Egg yolk, Amoxicillin, Bacitracin-polymyxin b, Cephalosporins, Hydrocortisone, Pcn [penicillins], Polymyxin b, Polymyxin b sulfate, Dilaudid cough, Garlic, Neomycin sulfate, and Rosemary oil    Allergies   Allergen Reactions     Fluconazole Hives     Hydromorphone Other (See Comments)     Other reaction(s): Hallucinations  Auditory Hallucinations       Onion GI Disturbance, Itching, Palpitations, Rash and Shortness Of Breath     Egg Yolk Diarrhea     Amoxicillin      Bacitracin-Polymyxin B Hives     Cephalosporins       Pt thinks she might be  allergic to cephalosporins- but not certain?     Hydrocortisone Unknown     Other reaction(s): Edema     Pcn [Penicillins]      Polymyxin B      Polymyxin B Sulfate      Dilaudid Cough Anxiety, Dizziness, Nausea and Vomiting and Visual Disturbance     Garlic GI Disturbance, Itching and Rash     Neomycin Sulfate Anxiety, Hives, Itching, Palpitations and Rash     Rosemary Oil Anxiety, GI Disturbance, Hives, Itching and Rash       MEDS:    Current Outpatient Medications:      albuterol (PROAIR HFA/PROVENTIL HFA/VENTOLIN HFA) 108 (90 Base) MCG/ACT inhaler, Inhale 2 puffs into the lungs every 6 hours as needed for shortness of breath, wheezing or cough, Disp: 18 g, Rfl: 1     aspirin-acetaminophen-caffeine (EXCEDRIN MIGRAINE) 250-250-65 MG per tablet, Take 1 tablet by mouth every 6 hours as needed for headaches, Disp: 80 tablet, Rfl:      Biotin 3 MG TABS, , Disp: , Rfl:      estradiol (ESTRACE) 0.1 MG/GM vaginal cream, Place 1 g vaginally twice a week Can use daily for the first two weeks, Disp: 42.5 g, Rfl: 1     LANsoprazole (PREVACID) 30 MG DR capsule, Take 1 capsule by mouth daily., Disp: 90 capsule, Rfl: 1     levothyroxine (SYNTHROID/LEVOTHROID) 25 MCG tablet, Take 25 mcg by mouth daily, Disp: , Rfl:      Multiple Vitamin (MULTIVITAMIN ADULT PO), , Disp: , Rfl:      phenylephrine HCl 10 MG TABS, , Disp: 84 tablet, Rfl:      VITAMIN E PO, , Disp: , Rfl:     SOCIAL HABITS:    History   Smoking Status     Never   Smokeless Tobacco     Never     Social History    Substance and Sexual Activity      Alcohol use: No      History   Drug Use No       FAMILY HISTORY:    Family History   Problem Relation Age of Onset     Diabetes Mother      Heart Disease Mother      Hypertension Mother      Hyperlipidemia Mother      Arthritis Father      Depression Maternal Grandmother      Heart Disease Maternal Grandmother      Hypertension Maternal Grandmother      Hyperlipidemia Maternal Grandmother       Depression Maternal Grandfather      Heart Disease Maternal Grandfather      Hypertension Maternal Grandfather      Hyperlipidemia Maternal Grandfather      Cancer Paternal Grandfather      Breast Cancer No family hx of      Colon Cancer No family hx of      Cerebrovascular Disease No family hx of      Ovarian Cancer No family hx of          Results for orders placed or performed during the hospital encounter of 03/03/23   MA Screening Digital Bilateral    Narrative    BILATERAL FULL FIELD DIGITAL SCREENING MAMMOGRAM    Performed on: 3/3/23    Compared to: 02/10/2022 and 02/08/2021    Technique: This study was evaluated with the assistance of Computer-Aided   Detection.    Findings: The breasts have scattered areas of fibroglandular density.    There is no radiographic evidence of malignancy.     Impression    IMPRESSION: ACR BI-RADS Category 1: Negative    RECOMMENDED FOLLOW-UP: Annual routine screening mammogram    The results and recommendations of this examination will be communicated   to the patient.        Iain Kelley MD             VEIN CLINIC LEG DRAWING    Patient History  If you have varicose or spider veins/legs, when did they occur?: Other:  Other:: Right leg VV, few on left leg. Symptoms  Please describe your expectations of therapy:: establish care, make sure nothing more serious will occur, doing IVF right now.    Past Treatment:  Have you ever been treated for the above problem(s)? : No  Have you ever worn prescription stockings?: Yes  Do you currently wear compression stockings?: No  Length of time compression stockings worn:: x 3 months, on/off throughout the years  Do you use medications to relieve your leg pain?: No  Do your symptoms, caused by VV or Spider Veins, interfere with work or activities of daily living?: Yes  Please, explain:: Leg cramping keeps her up at night    For Varicose and Spider Veins of the Legs:  Side:: Bilateral  Do you have now:: Fatigue in legs  The pain is made  worse with...: -- (toward end of day ? and at night cramping)  The pain is made better with...: Elevation of the leg (muscle cream to help with cramping)    Do you have a history of any of the following?: Family history of varicose/spider veins (family hx - father, grandparents.)  Side:: Left    VCSS  PAIN:: 1  Varicose Veins:: 1  Venous Edema:: 0  Skin Pigmentation:: 0  Inflamation:: 0  Induration:: 0  Number of active ulcers:: 0  Active ulcer duration:: 0  Active ulcer diameter:: 0  Compression Therapy:: 0  VCSS Score:: 2    CEAP:  CEAP:: C2         ASSESSMENT: Right calf varicose veins of unknown origin, visible stigmata of left distal vein overpressure as well    PLAN: Venous competency testing, continue compression stockings and clinical follow-up      Grover Cheek MD    30-minute spent today reviewing chart, discussing with patient and postvisit charting.      Again, thank you for allowing me to participate in the care of your patient.        Sincerely,        Grover Cheek MD

## 2024-04-16 ENCOUNTER — ANCILLARY PROCEDURE (OUTPATIENT)
Dept: MAMMOGRAPHY | Facility: CLINIC | Age: 44
End: 2024-04-16
Payer: COMMERCIAL

## 2024-04-16 DIAGNOSIS — Z12.31 VISIT FOR SCREENING MAMMOGRAM: ICD-10-CM

## 2024-04-16 PROCEDURE — 77063 BREAST TOMOSYNTHESIS BI: CPT | Mod: TC | Performed by: RADIOLOGY

## 2024-04-16 PROCEDURE — 77067 SCR MAMMO BI INCL CAD: CPT | Mod: TC | Performed by: RADIOLOGY

## 2024-04-23 ENCOUNTER — ANCILLARY PROCEDURE (OUTPATIENT)
Dept: ULTRASOUND IMAGING | Facility: CLINIC | Age: 44
End: 2024-04-23
Attending: INTERNAL MEDICINE
Payer: COMMERCIAL

## 2024-04-23 DIAGNOSIS — I83.91 VARICOSE VEINS OF RIGHT LOWER EXTREMITY, UNSPECIFIED WHETHER COMPLICATED: ICD-10-CM

## 2024-04-23 PROCEDURE — 93970 EXTREMITY STUDY: CPT | Performed by: INTERNAL MEDICINE

## 2024-05-22 DIAGNOSIS — N95.2 ATROPHIC VAGINITIS: ICD-10-CM

## 2024-05-22 RX ORDER — ESTRADIOL 0.1 MG/G
1 CREAM VAGINAL
Qty: 42.5 G | Refills: 1 | OUTPATIENT
Start: 2024-05-23

## 2024-05-22 NOTE — TELEPHONE ENCOUNTER
Estradiol cream   Last Written Prescription Date:  6/12/23  Last Fill Quantity: 1,   # refills: 1  Last Office Visit: 6/12/23  Future Office visit:  none scheduled   Abigail Mcbride CMA

## 2024-06-19 ENCOUNTER — TRANSFERRED RECORDS (OUTPATIENT)
Dept: HEALTH INFORMATION MANAGEMENT | Facility: CLINIC | Age: 44
End: 2024-06-19

## 2024-07-10 ENCOUNTER — HOSPITAL ENCOUNTER (EMERGENCY)
Facility: CLINIC | Age: 44
Discharge: HOME | End: 2024-07-10
Attending: SOCIAL WORKER | Admitting: SOCIAL WORKER
Payer: COMMERCIAL

## 2024-07-10 VITALS
WEIGHT: 206.79 LBS | SYSTOLIC BLOOD PRESSURE: 135 MMHG | RESPIRATION RATE: 16 BRPM | HEART RATE: 75 BPM | OXYGEN SATURATION: 97 % | TEMPERATURE: 97.3 F | DIASTOLIC BLOOD PRESSURE: 74 MMHG | BODY MASS INDEX: 29.6 KG/M2 | HEIGHT: 70 IN

## 2024-07-10 DIAGNOSIS — Z34.91 FIRST TRIMESTER PREGNANCY: ICD-10-CM

## 2024-07-10 DIAGNOSIS — I82.412 ACUTE DEEP VEIN THROMBOSIS (DVT) OF FEMORAL VEIN OF LEFT LOWER EXTREMITY (H): ICD-10-CM

## 2024-07-10 LAB
ALBUMIN UR-MCNC: 10 MG/DL
ANION GAP SERPL CALCULATED.3IONS-SCNC: 13 MMOL/L (ref 7–15)
APPEARANCE UR: CLEAR
APTT PPP: 26 SECONDS (ref 22–38)
BASOPHILS # BLD AUTO: 0.1 10E3/UL (ref 0–0.2)
BASOPHILS NFR BLD AUTO: 1 %
BILIRUB UR QL STRIP: NEGATIVE
BUN SERPL-MCNC: 8.1 MG/DL (ref 6–20)
CALCIUM SERPL-MCNC: 9 MG/DL (ref 8.6–10)
CHLORIDE SERPL-SCNC: 102 MMOL/L (ref 98–107)
COLOR UR AUTO: YELLOW
CREAT SERPL-MCNC: 0.61 MG/DL (ref 0.51–0.95)
DEPRECATED HCO3 PLAS-SCNC: 20 MMOL/L (ref 22–29)
EGFRCR SERPLBLD CKD-EPI 2021: >90 ML/MIN/1.73M2
EOSINOPHIL # BLD AUTO: 0.2 10E3/UL (ref 0–0.7)
EOSINOPHIL NFR BLD AUTO: 2 %
ERYTHROCYTE [DISTWIDTH] IN BLOOD BY AUTOMATED COUNT: 14.7 % (ref 10–15)
GLUCOSE SERPL-MCNC: 124 MG/DL (ref 70–99)
GLUCOSE UR STRIP-MCNC: NEGATIVE MG/DL
HCT VFR BLD AUTO: 36.7 % (ref 35–47)
HGB BLD-MCNC: 11.9 G/DL (ref 11.7–15.7)
HGB UR QL STRIP: ABNORMAL
IMM GRANULOCYTES # BLD: 0.1 10E3/UL
IMM GRANULOCYTES NFR BLD: 0 %
INR PPP: 0.98 (ref 0.85–1.15)
KETONES UR STRIP-MCNC: ABNORMAL MG/DL
LEUKOCYTE ESTERASE UR QL STRIP: NEGATIVE
LYMPHOCYTES # BLD AUTO: 2.9 10E3/UL (ref 0.8–5.3)
LYMPHOCYTES NFR BLD AUTO: 22 %
MCH RBC QN AUTO: 28.8 PG (ref 26.5–33)
MCHC RBC AUTO-ENTMCNC: 32.4 G/DL (ref 31.5–36.5)
MCV RBC AUTO: 89 FL (ref 78–100)
MONOCYTES # BLD AUTO: 0.7 10E3/UL (ref 0–1.3)
MONOCYTES NFR BLD AUTO: 5 %
MUCOUS THREADS #/AREA URNS LPF: PRESENT /LPF
NEUTROPHILS # BLD AUTO: 9.5 10E3/UL (ref 1.6–8.3)
NEUTROPHILS NFR BLD AUTO: 71 %
NITRATE UR QL: NEGATIVE
NRBC # BLD AUTO: 0 10E3/UL
NRBC BLD AUTO-RTO: 0 /100
PH UR STRIP: 6 [PH] (ref 5–7)
PLATELET # BLD AUTO: 233 10E3/UL (ref 150–450)
POTASSIUM SERPL-SCNC: 3.8 MMOL/L (ref 3.4–5.3)
RBC # BLD AUTO: 4.13 10E6/UL (ref 3.8–5.2)
RBC URINE: 8 /HPF
SODIUM SERPL-SCNC: 135 MMOL/L (ref 135–145)
SP GR UR STRIP: 1.02 (ref 1–1.03)
SQUAMOUS EPITHELIAL: 6 /HPF
UROBILINOGEN UR STRIP-MCNC: 2 MG/DL
WBC # BLD AUTO: 13.4 10E3/UL (ref 4–11)
WBC URINE: 2 /HPF

## 2024-07-10 PROCEDURE — 93005 ELECTROCARDIOGRAM TRACING: CPT

## 2024-07-10 PROCEDURE — 99284 EMERGENCY DEPT VISIT MOD MDM: CPT

## 2024-07-10 PROCEDURE — 250N000011 HC RX IP 250 OP 636: Performed by: SOCIAL WORKER

## 2024-07-10 PROCEDURE — 85730 THROMBOPLASTIN TIME PARTIAL: CPT | Performed by: SOCIAL WORKER

## 2024-07-10 PROCEDURE — 80048 BASIC METABOLIC PNL TOTAL CA: CPT | Performed by: SOCIAL WORKER

## 2024-07-10 PROCEDURE — 36415 COLL VENOUS BLD VENIPUNCTURE: CPT | Performed by: SOCIAL WORKER

## 2024-07-10 PROCEDURE — 81003 URINALYSIS AUTO W/O SCOPE: CPT | Performed by: SOCIAL WORKER

## 2024-07-10 PROCEDURE — 85025 COMPLETE CBC W/AUTO DIFF WBC: CPT | Performed by: SOCIAL WORKER

## 2024-07-10 PROCEDURE — 87086 URINE CULTURE/COLONY COUNT: CPT | Performed by: SOCIAL WORKER

## 2024-07-10 PROCEDURE — 85610 PROTHROMBIN TIME: CPT | Performed by: SOCIAL WORKER

## 2024-07-10 PROCEDURE — 96372 THER/PROPH/DIAG INJ SC/IM: CPT | Performed by: SOCIAL WORKER

## 2024-07-10 RX ORDER — ENOXAPARIN SODIUM 150 MG/ML
1.5 INJECTION SUBCUTANEOUS EVERY 12 HOURS
Qty: 54 ML | Refills: 0 | Status: SHIPPED | OUTPATIENT
Start: 2024-07-10 | End: 2024-07-11

## 2024-07-10 RX ORDER — ENOXAPARIN SODIUM 150 MG/ML
1.5 INJECTION SUBCUTANEOUS ONCE
Status: COMPLETED | OUTPATIENT
Start: 2024-07-10 | End: 2024-07-10

## 2024-07-10 RX ORDER — ENOXAPARIN SODIUM 100 MG/ML
1 INJECTION SUBCUTANEOUS ONCE
Status: DISCONTINUED | OUTPATIENT
Start: 2024-07-10 | End: 2024-07-10

## 2024-07-10 RX ADMIN — ENOXAPARIN SODIUM 135 MG: 150 INJECTION SUBCUTANEOUS at 21:07

## 2024-07-10 ASSESSMENT — COLUMBIA-SUICIDE SEVERITY RATING SCALE - C-SSRS
2. HAVE YOU ACTUALLY HAD ANY THOUGHTS OF KILLING YOURSELF IN THE PAST MONTH?: NO
6. HAVE YOU EVER DONE ANYTHING, STARTED TO DO ANYTHING, OR PREPARED TO DO ANYTHING TO END YOUR LIFE?: NO
1. IN THE PAST MONTH, HAVE YOU WISHED YOU WERE DEAD OR WISHED YOU COULD GO TO SLEEP AND NOT WAKE UP?: NO

## 2024-07-10 ASSESSMENT — ACTIVITIES OF DAILY LIVING (ADL)
ADLS_ACUITY_SCORE: 35

## 2024-07-10 NOTE — ED TRIAGE NOTES
US completed today that shows occlusive DVT from left calf to left femoral vein. 7 weeks pregnant. Sent to ER for eval. CMS intact to LLE. Denies chest pain and shortness of breath.

## 2024-07-10 NOTE — ED PROVIDER NOTES
"  Emergency Department Note      History of Present Illness     Chief Complaint  Leg Pain    HPI  Anahi Hannah is a 43 year old female who is currently 7 weeks pregnant and presents to the ED for leg pain. Today, the patient was seen at urgent care for leg pain and was diagnosed with a DVT in her left leg, extending from her calf to her midthigh. She has had pain when walking however her pain is manageable at rest.  No chest pain, does note some shortness of breath when she is climbing up the stairs at her house however is able to walk otherwise without shortness of breath.  She does note a family history of blood clots. Patient also denies vaginal bleeding or discharge. Notes that she is anxious after having this diagnosis given that her pregnancy was conceived with fertility speciality.     Independent Historian  None    Review of External Notes  Reviewed office visit from 7/10/24. Found to have DVT of the left posterior tibular vein extending to mid proximal femoral vein.  Past Medical History   Relevant Medical History and Problem List   Ankle and foot crushing injury  GERD  Tension headache    Medications  Lansoprazole  Progesterone   Estradiol  Levothyroxine  Phenylphrine HCL    Surgical History   Gynecological cryosurgery  Hip surgery  Watkins teeth extraction    Physical Exam   Patient Vitals for the past 24 hrs:   BP Temp Temp src Pulse Resp SpO2 Height Weight   07/10/24 1955 123/76 -- -- -- -- -- -- --   07/10/24 1945 -- -- -- 80 23 98 % -- --   07/10/24 1930 -- -- -- 86 21 97 % -- --   07/10/24 1915 -- -- -- 74 -- 98 % -- --   07/10/24 1900 -- -- -- 87 10 98 % -- --   07/10/24 1857 -- -- -- 84 18 98 % -- --   07/10/24 1715 128/66 -- -- 95 23 98 % -- --   07/10/24 1654 (!) 144/92 97.3  F (36.3  C) Temporal 107 16 100 % 1.778 m (5' 10\") 93.8 kg (206 lb 12.7 oz)     Physical Exam  General: Overall stable and nontoxic appearing  HEENT: Conjunctivae clear, no scleral icterus, mucous membranes " moist  Neuro: Alert, moving all extremities equally with intention  CV: Regular rate and rhythm, radial and DP pulses equal  Respiratory: No signs of respiratory distress, lungs clear to auscultation bilaterally   Abdomen: Soft, without rigidity or rebound throughout  MSK: No obvious left lower extremity swelling, no pallor or discoloration, perfusion intact and equal to other foot      Diagnostics   Lab Results   Labs Ordered and Resulted from Time of ED Arrival to Time of ED Departure   BASIC METABOLIC PANEL - Abnormal       Result Value    Sodium 135      Potassium 3.8      Chloride 102      Carbon Dioxide (CO2) 20 (*)     Anion Gap 13      Urea Nitrogen 8.1      Creatinine 0.61      GFR Estimate >90      Calcium 9.0      Glucose 124 (*)    CBC WITH PLATELETS AND DIFFERENTIAL - Abnormal    WBC Count 13.4 (*)     RBC Count 4.13      Hemoglobin 11.9      Hematocrit 36.7      MCV 89      MCH 28.8      MCHC 32.4      RDW 14.7      Platelet Count 233      % Neutrophils 71      % Lymphocytes 22      % Monocytes 5      % Eosinophils 2      % Basophils 1      % Immature Granulocytes 0      NRBCs per 100 WBC 0      Absolute Neutrophils 9.5 (*)     Absolute Lymphocytes 2.9      Absolute Monocytes 0.7      Absolute Eosinophils 0.2      Absolute Basophils 0.1      Absolute Immature Granulocytes 0.1      Absolute NRBCs 0.0     PARTIAL THROMBOPLASTIN TIME - Normal    aPTT 26     INR - Normal    INR 0.98     URINE MACROSCOPIC WITH REFLEX TO MICRO   URINE CULTURE     EKG   EKG 1708 sinus rhythm no signs of ischemia normal axis  Rate 88  QRS 70 QTc 442    Independent Interpretation  None    ED Course    Medications Administered  Medications   enoxaparin ANTICOAGULANT (LOVENOX) injection 135 mg (has no administration in time range)     Social Determinants of Health adding to complexity of care  None    ED Course  ED Course as of 07/11/24 1233   Wed Jul 10, 2024   5655 I obtained history and examined the patient as noted  above.     1828 Spoke with Dr. Boyd.    1857 Spoke with Dr Schulz with IR    1935 I rechecked and updated the patient.     2002 I rechecked and updated the patient.     2028 Spoke with hematology, Dr. Batres.    2112 The patient is comfortable with plan for discharge.       Medical Decision Making / Diagnosis   CMS Diagnoses: None    MIPS     None    MDM  Anahi Hannah is a 43 year old female currently 7 weeks pregnant who presented to the emergency department with a chief complaint of acute DVT.  No signs of phlegmasia alba dolens or cerulea dolens. Spoke with IR, agree that patient is not candidate at this time for thrombectomy. Started on daily therapeutic dose lovenox as patient is pregnant and has no history of massive GI bleed or other risk factors for increased bleed. Arranged follow up with heme/onc and Ob/gyn, as she recently transitioned from her fertility Ob/gyn team to regular Ob/gyn team.  We had a shared decision-making conversation regarding the risks and benefits of pursuing CT scanning for pulmonary embolism, ultimately patient preferred to defer this.  I do think is reasonable as she no shortness of breath at rest, no hypoxia, able to ambulate without hypoxia or shortness of breath and we are beginning treatment dose anticoagulation. No signs of massive PE. We discussed monitoring for bleeding as well as strict return precautions.  She verbalized understanding and was discharged in stable condition.     Disposition  The patient was discharged.     ICD-10 Codes:    ICD-10-CM    1. Acute deep vein thrombosis (DVT) of femoral vein of left lower extremity (H)  I82.412       2. First trimester pregnancy  Z34.91            Discharge Medications  New Prescriptions    No medications on file     Scribe Disclosure:  Carlos A MARCOS, am serving as a scribe at 5:54 PM on 7/10/2024 to document services personally performed by Gail Harding MD based on my observations and the provider's  statements to me.       Gail Harding MD  07/11/24 1248       Gail Harding MD  07/11/24 0494

## 2024-07-11 ENCOUNTER — VIRTUAL VISIT (OUTPATIENT)
Dept: OBGYN | Facility: CLINIC | Age: 44
End: 2024-07-11
Payer: COMMERCIAL

## 2024-07-11 ENCOUNTER — TELEPHONE (OUTPATIENT)
Dept: OBGYN | Facility: CLINIC | Age: 44
End: 2024-07-11

## 2024-07-11 DIAGNOSIS — O09.519 SUPERVISION OF HIGH-RISK PREGNANCY OF ELDERLY PRIMIGRAVIDA: ICD-10-CM

## 2024-07-11 DIAGNOSIS — I82.409 DVT (DEEP VENOUS THROMBOSIS) (H): Primary | ICD-10-CM

## 2024-07-11 DIAGNOSIS — O22.30 DVT (DEEP VEIN THROMBOSIS) IN PREGNANCY: Primary | ICD-10-CM

## 2024-07-11 PROBLEM — N20.0 KIDNEY STONE: Status: ACTIVE | Noted: 2017-07-11

## 2024-07-11 LAB
ATRIAL RATE - MUSE: 88 BPM
DIASTOLIC BLOOD PRESSURE - MUSE: NORMAL MMHG
INTERPRETATION ECG - MUSE: NORMAL
P AXIS - MUSE: 4 DEGREES
PR INTERVAL - MUSE: 142 MS
QRS DURATION - MUSE: 70 MS
QT - MUSE: 366 MS
QTC - MUSE: 442 MS
R AXIS - MUSE: -10 DEGREES
SYSTOLIC BLOOD PRESSURE - MUSE: NORMAL MMHG
T AXIS - MUSE: 5 DEGREES
VENTRICULAR RATE- MUSE: 88 BPM

## 2024-07-11 PROCEDURE — 99207 PR NO CHARGE NURSE ONLY: CPT | Mod: 93

## 2024-07-11 RX ORDER — ENOXAPARIN SODIUM 150 MG/ML
1.5 INJECTION SUBCUTANEOUS EVERY 12 HOURS
Qty: 54 ML | Refills: 0 | Status: SHIPPED | OUTPATIENT
Start: 2024-07-11 | End: 2024-08-06

## 2024-07-11 RX ORDER — PROGESTERONE 50 MG/ML
INJECTION, SOLUTION INTRAMUSCULAR DAILY
COMMUNITY
End: 2024-07-29

## 2024-07-11 RX ORDER — PYRIDOXINE HCL (VITAMIN B6) 25 MG
25 TABLET ORAL DAILY
COMMUNITY

## 2024-07-11 NOTE — PROGRESS NOTES
"NPN nurse visit done over the phone. Pt will be given NPN folder and book at her upcoming appt.  Discussed optional screening available to assess chromosomal anomalies. Questions answered. Informed pt of the clinic structure (on-call does deliveries for the day, may be male or female doctor). Pt advised to call the clinic if she has any questions or concerns related to her pregnancy. Prenatal labs will be obtained at her upcoming appt. New prenatal visit scheduled on 7/29/24 with Dr Yee.    7w0d    Menstrual cycles: no period since 2021  Date of positive pregnancy test: IVF transfer date   Medications stopped upon pos HPT: none, but pt sees [facility] and is on hormones. These will be weaning and all done by July 28, 2024    Last pap: 6/2023 NIL    IVF, donor egg w/husbands sperm. FOB is CF carrier. Genetic testing was done on embryo. [facility].    7/10/24- ED for large DVT. On Lovenox. Seeing Heme in a week or so.     Hx of large \"pumpkin\" size fibroid removed at [facility].      Patient supplied answers from flow sheet for:  Prenatal OB Questionnaire.  Past Medical History  Have you ever recieved care for your mental health? : (!) Yes  Have you ever been in a major accident or suffered serious trauma?: No  Within the last year, has anyone hit, slapped, kicked or otherwise hurt you?: No  In the last year, has anyone forced you to have sex when you didn't want to?: No    Past Medical History 2   Have you ever received a blood transfusion?: No  Would you accept a blood transfusion if was medically recommended?: Yes  Does anyone in your home smoke?: No   Is your blood type Rh negative?: No  Have you ever ?: No  Have you been hospitalized for a nonsurgical reason excluding normal delivery?: (!) Yes  Have you ever had an abnormal pap smear?: (!) Yes    Past Medical History (Continued)  Do you have a history of abnormalities of the uterus?: (!) Yes (pumpkin size fibroid removed)  Did your mother " take KARINA or any other hormones when she was pregnant with you?: Unknown  Do you have any other problems we have not asked about which you feel may be important to this pregnancy?: No

## 2024-07-11 NOTE — DISCHARGE INSTRUCTIONS
You were seen the emergency department for a blood clot in your leg that was found in urgent care.    Would not see signs of acute emergency at this time.  We are starting you on a blood thinner at this time.  The hematology team should be reaching out to schedule you for follow-up as well as OB/GYN team.    Please give yourself this blood thinner once a day as an injection.    If you start to have chest pain, shortness of breath that is severe and even when you are just at rest, if you cough up blood, if you start to have dark tarry stools or vomiting blood or bleeding that will not stop, please come back to the emergency department immediately.

## 2024-07-11 NOTE — TELEPHONE ENCOUNTER
----- Message from Autumn Boyd sent at 7/10/2024  6:32 PM CDT -----  Regarding: early pregnancy and DVT  This patient was in the ED with a large DVT at ~7 wks pregnant.  She hasn't yet had a RN intake visit or a NOB visit, but she's high risk enough she will probably need one very soon.  She is also 43 years old and it's an IVF pregnancy.  Could you look into getting her RN intake visit and NOB visit moved up?  Ideally in the next week or two.  Thanks.

## 2024-07-11 NOTE — TELEPHONE ENCOUNTER
Spoke to pt .    I will do intake this afternoon.     Earliest MD appt I could get is July 29 with Dr Yee (wasn't sure of you wanted her added to your schedule or okay with this visit?).    Pharmacy does not have lovenox so she has not started this. Re-sent to [facility] and called them ask for urgent fill.    Pt aware and will .     Sonja QUIGLEY RN BSN

## 2024-07-12 LAB — BACTERIA UR CULT: NO GROWTH

## 2024-07-17 ENCOUNTER — TRANSFERRED RECORDS (OUTPATIENT)
Dept: HEALTH INFORMATION MANAGEMENT | Facility: CLINIC | Age: 44
End: 2024-07-17
Payer: COMMERCIAL

## 2024-07-22 PROBLEM — O09.519 ADVANCED MATERNAL AGE, 1ST PREGNANCY: Status: ACTIVE | Noted: 2024-07-22

## 2024-07-22 PROBLEM — O34.29 PREGNANCY WITH HISTORY OF UTERINE MYOMECTOMY: Status: ACTIVE | Noted: 2024-07-22

## 2024-07-22 PROBLEM — Z86.718: Status: ACTIVE | Noted: 2024-07-22

## 2024-07-22 PROBLEM — O09.899: Status: ACTIVE | Noted: 2024-07-22

## 2024-07-22 PROBLEM — O09.819 PREGNANCY RESULTING FROM IN VITRO FERTILIZATION: Status: ACTIVE | Noted: 2024-07-22

## 2024-07-22 LAB
ABO/RH(D): NORMAL
ANTIBODY SCREEN: NEGATIVE
SPECIMEN EXPIRATION DATE: NORMAL

## 2024-07-23 ENCOUNTER — LAB (OUTPATIENT)
Dept: LAB | Facility: CLINIC | Age: 44
End: 2024-07-23
Payer: COMMERCIAL

## 2024-07-23 ENCOUNTER — ANCILLARY PROCEDURE (OUTPATIENT)
Dept: ULTRASOUND IMAGING | Facility: CLINIC | Age: 44
End: 2024-07-23
Payer: COMMERCIAL

## 2024-07-23 DIAGNOSIS — O09.519 SUPERVISION OF HIGH-RISK PREGNANCY OF ELDERLY PRIMIGRAVIDA: ICD-10-CM

## 2024-07-23 PROCEDURE — 86900 BLOOD TYPING SEROLOGIC ABO: CPT

## 2024-07-23 PROCEDURE — 86803 HEPATITIS C AB TEST: CPT

## 2024-07-23 PROCEDURE — 76801 OB US < 14 WKS SINGLE FETUS: CPT | Performed by: OBSTETRICS & GYNECOLOGY

## 2024-07-23 PROCEDURE — 87340 HEPATITIS B SURFACE AG IA: CPT

## 2024-07-23 PROCEDURE — 86762 RUBELLA ANTIBODY: CPT

## 2024-07-23 PROCEDURE — 86850 RBC ANTIBODY SCREEN: CPT

## 2024-07-23 PROCEDURE — 36415 COLL VENOUS BLD VENIPUNCTURE: CPT

## 2024-07-23 PROCEDURE — 87389 HIV-1 AG W/HIV-1&-2 AB AG IA: CPT

## 2024-07-23 PROCEDURE — 86780 TREPONEMA PALLIDUM: CPT

## 2024-07-23 PROCEDURE — 86901 BLOOD TYPING SEROLOGIC RH(D): CPT

## 2024-07-24 LAB
HBV SURFACE AG SERPL QL IA: NONREACTIVE
HCV AB SERPL QL IA: NONREACTIVE
HIV 1+2 AB+HIV1 P24 AG SERPL QL IA: NONREACTIVE
RUBV IGG SERPL QL IA: 6.34 INDEX
RUBV IGG SERPL QL IA: POSITIVE
T PALLIDUM AB SER QL: NONREACTIVE

## 2024-07-29 ENCOUNTER — TRANSCRIBE ORDERS (OUTPATIENT)
Dept: MATERNAL FETAL MEDICINE | Facility: CLINIC | Age: 44
End: 2024-07-29

## 2024-07-29 ENCOUNTER — PRENATAL OFFICE VISIT (OUTPATIENT)
Dept: OBGYN | Facility: CLINIC | Age: 44
End: 2024-07-29
Payer: COMMERCIAL

## 2024-07-29 VITALS
SYSTOLIC BLOOD PRESSURE: 128 MMHG | WEIGHT: 200 LBS | BODY MASS INDEX: 29.62 KG/M2 | HEIGHT: 69 IN | DIASTOLIC BLOOD PRESSURE: 78 MMHG

## 2024-07-29 DIAGNOSIS — D68.51 FACTOR 5 LEIDEN MUTATION, HETEROZYGOUS (H): ICD-10-CM

## 2024-07-29 DIAGNOSIS — O26.90 PREGNANCY RELATED CONDITION, ANTEPARTUM: Primary | ICD-10-CM

## 2024-07-29 DIAGNOSIS — O34.29 PREGNANCY WITH HISTORY OF UTERINE MYOMECTOMY: Primary | ICD-10-CM

## 2024-07-29 DIAGNOSIS — O99.281 HYPOTHYROIDISM AFFECTING PREGNANCY IN FIRST TRIMESTER: ICD-10-CM

## 2024-07-29 DIAGNOSIS — O09.511 PRIMIGRAVIDA OF ADVANCED MATERNAL AGE IN FIRST TRIMESTER: ICD-10-CM

## 2024-07-29 DIAGNOSIS — O22.31 DEEP VEIN THROMBOSIS (DVT) AFFECTING PREGNANCY IN FIRST TRIMESTER: ICD-10-CM

## 2024-07-29 DIAGNOSIS — E03.9 HYPOTHYROIDISM AFFECTING PREGNANCY IN FIRST TRIMESTER: ICD-10-CM

## 2024-07-29 DIAGNOSIS — Z11.3 SCREEN FOR STD (SEXUALLY TRANSMITTED DISEASE): ICD-10-CM

## 2024-07-29 DIAGNOSIS — O09.811 PREGNANCY RESULTING FROM IN VITRO FERTILIZATION IN FIRST TRIMESTER: ICD-10-CM

## 2024-07-29 PROBLEM — O22.30 DVT (DEEP VEIN THROMBOSIS) IN PREGNANCY: Status: ACTIVE | Noted: 2024-07-22

## 2024-07-29 PROBLEM — O99.280 HYPOTHYROIDISM AFFECTING PREGNANCY: Status: ACTIVE | Noted: 2024-07-29

## 2024-07-29 LAB
ALBUMIN MFR UR ELPH: 66.5 MG/DL
ALT SERPL W P-5'-P-CCNC: 11 U/L (ref 0–50)
AST SERPL W P-5'-P-CCNC: 15 U/L (ref 0–45)
CREAT UR-MCNC: 467 MG/DL
PROT/CREAT 24H UR: 0.14 MG/MG CR (ref 0–0.2)
TSH SERPL DL<=0.005 MIU/L-ACNC: 0.81 UIU/ML (ref 0.3–4.2)

## 2024-07-29 PROCEDURE — 84443 ASSAY THYROID STIM HORMONE: CPT | Performed by: OBSTETRICS & GYNECOLOGY

## 2024-07-29 PROCEDURE — 84156 ASSAY OF PROTEIN URINE: CPT | Performed by: OBSTETRICS & GYNECOLOGY

## 2024-07-29 PROCEDURE — 84450 TRANSFERASE (AST) (SGOT): CPT | Performed by: OBSTETRICS & GYNECOLOGY

## 2024-07-29 PROCEDURE — 87491 CHLMYD TRACH DNA AMP PROBE: CPT | Performed by: OBSTETRICS & GYNECOLOGY

## 2024-07-29 PROCEDURE — 84460 ALANINE AMINO (ALT) (SGPT): CPT | Performed by: OBSTETRICS & GYNECOLOGY

## 2024-07-29 PROCEDURE — 99214 OFFICE O/P EST MOD 30 MIN: CPT | Performed by: OBSTETRICS & GYNECOLOGY

## 2024-07-29 PROCEDURE — 87591 N.GONORRHOEAE DNA AMP PROB: CPT | Performed by: OBSTETRICS & GYNECOLOGY

## 2024-07-29 PROCEDURE — 36415 COLL VENOUS BLD VENIPUNCTURE: CPT | Performed by: OBSTETRICS & GYNECOLOGY

## 2024-07-29 NOTE — PROGRESS NOTES
This is a 43 year old female patient,   who presents for her first obstetrical visit. This pregnancy is Planned, Desired.    EDC 2025 by embryo transfer which makes her 9w4d  today.  Her cycles are regular.  Her last menstrual period was normal. Since her LMP, she has experienced  nausea and decreased appetite .  She denies vaginal discharge, pelvic pain, and vaginal bleeding. Ultrasound in the 1st trimester showed EDC consistent with dates by ET.     OB History    Para Term  AB Living   1 0 0 0 0 0   SAB IAB Ectopic Multiple Live Births   0 0 0 0 0      # Outcome Date GA Lbr Jacobo/2nd Weight Sex Type Anes PTL Lv   1 Current                History of GDM: No,  PTL : No,  History of HTN in pregnancy: No,  Thrombocytopenia: No,  Shoulder dystocia: No,  Vacuum Extraction: No  PPH: No   3rd of 4th degree laceration: No.   Other complications: Yes, current preg - 7/10/2024 Large DVT LLE posterior tibial vein up to proximal femoral vein, Hx large myomectomy, IVF pregnancy      Since her last LMP she denies use of alcohol, tobacco and street drugs.    HISTORY:  Past Medical History:   Diagnosis Date    Crushing injury of ankle and foot     DVT (deep venous thrombosis) (H) 07/10/2024    Early menopause     Fibroid uterus 2023    large fibroid removed at [facility]    Gastroesophageal reflux disease without esophagitis     History of colposcopy with cervical biopsy     pos high risk HPV    Pelvic floor dysfunction     Pneumonia of right lower lobe due to infectious organism     Tension headache      Past Surgical History:   Procedure Laterality Date    GYNECOLOGIC CRYOSURGERY      for HPV    HIP SURGERY      Congenital hip dysplasia    UTERINE FIBROID SURGERY  2023    large fibroid removal at [facility]    WISDOM TOOTH EXTRACTION       Family History   Problem Relation Age of Onset    Diabetes Mother     Heart Disease Mother     Hypertension Mother     Hyperlipidemia Mother     Arthritis Father      Depression Maternal Grandmother     Heart Disease Maternal Grandmother     Hypertension Maternal Grandmother     Hyperlipidemia Maternal Grandmother     Depression Maternal Grandfather     Heart Disease Maternal Grandfather     Hypertension Maternal Grandfather     Hyperlipidemia Maternal Grandfather     Cancer Paternal Grandfather     Breast Cancer No family hx of     Colon Cancer No family hx of     Cerebrovascular Disease No family hx of     Ovarian Cancer No family hx of      Social History     Socioeconomic History    Marital status:    Occupational History    Occupation: [employer]   Tobacco Use    Smoking status: Never     Passive exposure: Never    Smokeless tobacco: Never   Vaping Use    Vaping status: Never Used   Substance and Sexual Activity    Alcohol use: No    Drug use: No    Sexual activity: Yes     Partners: Male     Social Determinants of Health     Financial Resource Strain: Low Risk  (10/22/2023)    Financial Resource Strain     Within the past 12 months, have you or your family members you live with been unable to get utilities (heat, electricity) when it was really needed?: No   Food Insecurity: Low Risk  (10/22/2023)    Food Insecurity     Within the past 12 months, did you worry that your food would run out before you got money to buy more?: No     Within the past 12 months, did the food you bought just not last and you didn t have money to get more?: No   Transportation Needs: Low Risk  (10/22/2023)    Transportation Needs     Within the past 12 months, has lack of transportation kept you from medical appointments, getting your medicines, non-medical meetings or appointments, work, or from getting things that you need?: No   Physical Activity: Insufficiently Active (3/28/2023)    [facility]    Exercise Vital Sign     Days of Exercise per Week: 3 days     Minutes of Exercise per Session: 30 min   Stress: No Stress Concern Present  (3/28/2023)    [facility]    [place] [place] [employer] - Occupational Stress Questionnaire     Feeling of Stress : Not at all   Social Connections: Socially Integrated (3/28/2023)    [facility] from [facility], [facility]    Social Connection and Isolation Panel [NHANES]     Frequency of Communication with Friends and Family: More than three times a week     Frequency of Social Gatherings with Friends and Family: Once a week     Attends Confucianism Services: 1 to 4 times per year     Active Member of Clubs or Organizations: Yes     Attends Club or Organization Meetings: More than 4 times per year     Marital Status:    Interpersonal Safety: Low Risk  (10/23/2023)    Interpersonal Safety     Do you feel physically and emotionally safe where you currently live?: Yes     Within the past 12 months, have you been hit, slapped, kicked or otherwise physically hurt by someone?: No     Within the past 12 months, have you been humiliated or emotionally abused in other ways by your partner or ex-partner?: No   Housing Stability: Low Risk  (10/22/2023)    Housing Stability     Do you have housing? : Yes     Are you worried about losing your housing?: No     Current Outpatient Medications   Medication Sig Dispense Refill    albuterol (PROAIR HFA/PROVENTIL HFA/VENTOLIN HFA) 108 (90 Base) MCG/ACT inhaler Inhale 2 puffs into the lungs every 6 hours as needed for shortness of breath, wheezing or cough 18 g 1    aspirin-acetaminophen-caffeine (EXCEDRIN MIGRAINE) 250-250-65 MG per tablet Take 1 tablet by mouth every 6 hours as needed for headaches 80 tablet     enoxaparin ANTICOAGULANT (LOVENOX) 150 MG/ML syringe Inject 0.9 mLs (135 mg) subcutaneously every 12 hours 54 mL 0    LANsoprazole (PREVACID) 30 MG DR capsule Take 1 capsule by mouth daily. 90 capsule 1    levothyroxine (SYNTHROID/LEVOTHROID) 25 MCG tablet Take 1 tablet (25 mcg) by mouth daily 90 tablet 2    Prenatal MV & Min  w/FA-DHA (PRENATAL GUMMIES PO)       progesterone 200 MG VA SUPP Place 200 mg vaginally 2 times daily      progesterone, in sesame oil, 50 MG/ML injection Inject into the muscle daily Will start weaning July 21. That is when she starts every other day      pyridOXINE (VITAMIN  B-6) 25 MG tablet Take 25 mg by mouth daily      UNABLE TO FIND MEDICATION NAME: estrogen patch, unsure of dose. Apply 4 patches changing every other day      UNKNOWN TO PATIENT Estradiol tablets, insert 1 tab vaginally twice daily       No current facility-administered medications for this visit.     Allergies   Allergen Reactions    Fluconazole Hives    Hydromorphone Other (See Comments)     Other reaction(s): Hallucinations  Auditory Hallucinations      Onion GI Disturbance, Itching, Palpitations, Rash, Shortness Of Breath and Nausea    Egg Yolk Diarrhea    Amoxicillin Hives    Bacitracin-Polymyxin B Hives    Cephalosporins      Pt thinks she might be  allergic to cephalosporins- but not certain?    Hydrocortisone Unknown     Other reaction(s): Edema    Pcn [Penicillins]     Polymyxin B Sulfate Hives    Garlic GI Disturbance, Itching, Rash and Nausea    Neomycin Sulfate Anxiety, Hives, Itching, Palpitations and Rash    Rosemary Oil Anxiety, GI Disturbance, Hives, Itching, Rash and Nausea       Past medical, surgical, social and family history were reviewed and updated in EPIC.    ROS:   12 point review of systems negative other than symptoms noted below.    EXAM:  LMP  (LMP Unknown)    BMI: There is no height or weight on file to calculate BMI.    EXAM:  Constitutional: Appearance: Well nourished, well developed alert, in no acute distress  Chest:  Respiratory Effort:  Breathing unlabored  Cardiovascular:Heart    Auscultation:  Regular rate, normal rhythm, no murmurs present  Gastrointestinal:  Abdominal Examination:  Abdomen nontender to palpation, tone normal without     rigidity or guarding, no masses present, umbilicus without  lesions    Liver and speen:  No hepatomegaly present, liver nontender to palpation    Hernias:  No hernias present    FHTs not attempted due to early GA.  Skin:  General Inspection:  No rashes present, no lesions present, no areas of  discoloration.  Neurologic/Psychiatric:    Mental Status:  Oriented X3       Pelvis: External genitalia, Bartholin, urethral, and [place] glands within normal limits. Urethra is without lesion and nontender to palpation. Bladder is nontender. On speculum exam, cervix is without lesion and vagina is normal without lesion or discharge. Pap smear not due until 6/2026, GC/Chlam done.    ASSESSMENT:      ICD-10-CM    1. Pregnancy with history of uterine myomectomy  O34.29       2. Deep vein thrombosis (DVT) affecting pregnancy in first trimester  O22.31       3. Pregnancy resulting from in vitro fertilization in first trimester  O09.811 [facility] Referral - Pregnancy      4. Primigravida of advanced maternal age in first trimester  O09.511 [facility] Referral - Pregnancy     AST     ALT     Protein  random urine      5. Screen for STD (sexually transmitted disease)  Z11.3 NEISSERIA GONORRHOEA PCR     CHLAMYDIA TRACHOMATIS PCR      6. Factor 5 Leiden mutation, heterozygous (H24)  D68.51       7. Hypothyroidism affecting pregnancy in first trimester  O99.281 TSH with free T4 reflex    E03.9           PLAN:    Prenatal labs reviewed. She has no questions.    Had normal CBC and CMP already. Will check AST, ALT, Ur Prot/Creat today as baseline PreE labs.    Check TSH, reflex T4. Adjust Synthroid to target TSH < 2.5.    Start baby ASA at 12+ wks.    Continue Lovenox per [facility]. Will get their records.    Discussed options for screening for and diagnosis of chromosomal anomalies, including first trimester screen, noninvasive prenatal testing/cell-free fetal DNA testing, CVS/amniocentesis, quad screen, and ultrasound or comprehensive Level II US at 18-20 weeks. Since embryo was  genetically screened prior to transfer, she is electing MsAFP at 16 weeks, Lvl 2 U/S at 20 weeks.    Reviewed early pregnancy education, diet, exercise, prenatal vitamins, intercourse. Reviewed the call schedule, labor and delivery, and the schedule of prenatal visits.    RTC 4 weeks. She is encouraged to call sooner with questions or concerns.      Dann Yee MD  [facility]

## 2024-07-29 NOTE — NURSING NOTE
"Chief Complaint   Patient presents with    Prenatal Care     9 weeks 4 days- no appetite with nausea        Initial /78 (BP Location: Right arm, Patient Position: Sitting, Cuff Size: Adult Regular)   Ht 1.753 m (5' 9\")   Wt 90.7 kg (200 lb)   LMP  (LMP Unknown)   Breastfeeding No   BMI 29.53 kg/m   Estimated body mass index is 29.53 kg/m  as calculated from the following:    Height as of this encounter: 1.753 m (5' 9\").    Weight as of this encounter: 90.7 kg (200 lb).  BP completed using cuff size: regular    Questioned patient about current smoking habits.  Pt. has never smoked.          The following HM Due: NONE    9w4d  Amando Combs CMA              "

## 2024-07-30 LAB
C TRACH DNA SPEC QL NAA+PROBE: NEGATIVE
N GONORRHOEA DNA SPEC QL NAA+PROBE: NEGATIVE

## 2024-08-06 DIAGNOSIS — O22.30 DVT (DEEP VEIN THROMBOSIS) IN PREGNANCY: ICD-10-CM

## 2024-08-06 RX ORDER — ENOXAPARIN SODIUM 150 MG/ML
1.5 INJECTION SUBCUTANEOUS EVERY 12 HOURS
Qty: 54 ML | Refills: 0 | Status: SHIPPED | OUTPATIENT
Start: 2024-08-06

## 2024-08-06 NOTE — TELEPHONE ENCOUNTER
[facility] Call Center    Phone Message    May a detailed message be left on voicemail: yes     Reason for Call: Medication Refill Request    Has the patient contacted the pharmacy for the refill? Yes   Name of medication being requested: enoxaparin ANTICOAGULANT (LOVENOX) 150 MG/ML syringe  Provider who prescribed the medication: pt stated she was prescribed this medication in the ED for blood clots  Pharmacy: [facility] [place] [id number] [facility]    Date medication is needed: pt has 1 injection for tonight, and 1 for tomorrow morning, then she will be out, please reach out to pt, thank you       Action Taken: Message routed to:  Other: obgyn    Travel Screening: Not Applicable     Date of Service:

## 2024-08-28 ENCOUNTER — PRENATAL OFFICE VISIT (OUTPATIENT)
Dept: OBGYN | Facility: CLINIC | Age: 44
End: 2024-08-28
Payer: COMMERCIAL

## 2024-08-28 VITALS — DIASTOLIC BLOOD PRESSURE: 82 MMHG | SYSTOLIC BLOOD PRESSURE: 132 MMHG | BODY MASS INDEX: 29.42 KG/M2 | WEIGHT: 199.2 LBS

## 2024-08-28 DIAGNOSIS — O34.29 PREGNANCY WITH HISTORY OF UTERINE MYOMECTOMY: ICD-10-CM

## 2024-08-28 DIAGNOSIS — O09.812 PREGNANCY RESULTING FROM IN VITRO FERTILIZATION IN SECOND TRIMESTER: ICD-10-CM

## 2024-08-28 DIAGNOSIS — E03.9 HYPOTHYROIDISM AFFECTING PREGNANCY IN SECOND TRIMESTER: ICD-10-CM

## 2024-08-28 DIAGNOSIS — O22.30 DVT (DEEP VEIN THROMBOSIS) IN PREGNANCY: ICD-10-CM

## 2024-08-28 DIAGNOSIS — O99.282 HYPOTHYROIDISM AFFECTING PREGNANCY IN SECOND TRIMESTER: ICD-10-CM

## 2024-08-28 DIAGNOSIS — O09.512 PRIMIGRAVIDA OF ADVANCED MATERNAL AGE IN SECOND TRIMESTER: Primary | ICD-10-CM

## 2024-08-28 PROCEDURE — 99207 PR COMPLICATED OB VISIT: CPT | Performed by: OBSTETRICS & GYNECOLOGY

## 2024-08-28 RX ORDER — ASPIRIN 81 MG/1
TABLET ORAL
COMMUNITY

## 2024-08-28 NOTE — PROGRESS NOTES
No c/o's. On baby ASA. On Lovenox for DVT, managed by [facility]. MsAFP at next visit. Lvl 2 U/S at 18-20 weeks. RTC 4 weeks. Will schedule C/S at next visit. Plan to recheck TSH at 22 week visit.    Encounter Diagnoses   Name Primary?    Primigravida of advanced maternal age in second trimester Yes    DVT (deep vein thrombosis) in pregnancy     Pregnancy with history of uterine myomectomy     Pregnancy resulting from in vitro fertilization in second trimester     Hypothyroidism affecting pregnancy in second trimester        Risk factors listed above are stable and being addressed as noted.    Dann Yee MD  [facility] [place]

## 2024-08-28 NOTE — NURSING NOTE
"Chief Complaint   Patient presents with    Prenatal Care     14 weeks 0 days   [facility] 10/2/24        Initial /82 (BP Location: Right arm, Cuff Size: Adult Regular)   Wt 90.4 kg (199 lb 3.2 oz)   LMP  (LMP Unknown)   BMI 29.42 kg/m   Estimated body mass index is 29.42 kg/m  as calculated from the following:    Height as of 24: 1.753 m (5' 9\").    Weight as of this encounter: 90.4 kg (199 lb 3.2 oz).  BP completed using cuff size: large    Questioned patient about current smoking habits.  Pt. has never smoked.    14w0d       The following HM Due: NONE      + food adversion/ nausea         Aline Solano, CMA on 2024 at 11:52 AM       "

## 2024-09-15 ENCOUNTER — HEALTH MAINTENANCE LETTER (OUTPATIENT)
Age: 44
End: 2024-09-15

## 2024-09-24 ENCOUNTER — PRE VISIT (OUTPATIENT)
Dept: MATERNAL FETAL MEDICINE | Facility: CLINIC | Age: 44
End: 2024-09-24
Payer: COMMERCIAL

## 2024-09-24 DIAGNOSIS — E03.9 HYPOTHYROIDISM AFFECTING PREGNANCY IN SECOND TRIMESTER: ICD-10-CM

## 2024-09-24 DIAGNOSIS — O99.282 HYPOTHYROIDISM AFFECTING PREGNANCY IN SECOND TRIMESTER: ICD-10-CM

## 2024-09-24 DIAGNOSIS — O09.819 PREGNANCY RESULTING FROM IN VITRO FERTILIZATION: ICD-10-CM

## 2024-09-24 DIAGNOSIS — O22.30 DVT (DEEP VEIN THROMBOSIS) IN PREGNANCY: ICD-10-CM

## 2024-09-24 DIAGNOSIS — O09.519 ADVANCED MATERNAL AGE, 1ST PREGNANCY: ICD-10-CM

## 2024-09-24 DIAGNOSIS — O34.29 PREGNANCY WITH HISTORY OF UTERINE MYOMECTOMY: Primary | ICD-10-CM

## 2024-09-27 ENCOUNTER — PRENATAL OFFICE VISIT (OUTPATIENT)
Dept: OBGYN | Facility: CLINIC | Age: 44
End: 2024-09-27
Payer: COMMERCIAL

## 2024-09-27 ENCOUNTER — TELEPHONE (OUTPATIENT)
Dept: OBGYN | Facility: CLINIC | Age: 44
End: 2024-09-27

## 2024-09-27 VITALS
DIASTOLIC BLOOD PRESSURE: 86 MMHG | SYSTOLIC BLOOD PRESSURE: 124 MMHG | HEIGHT: 69 IN | WEIGHT: 199 LBS | BODY MASS INDEX: 29.47 KG/M2

## 2024-09-27 DIAGNOSIS — O09.812 PREGNANCY RESULTING FROM IN VITRO FERTILIZATION IN SECOND TRIMESTER: ICD-10-CM

## 2024-09-27 DIAGNOSIS — E03.9 HYPOTHYROIDISM AFFECTING PREGNANCY IN SECOND TRIMESTER: ICD-10-CM

## 2024-09-27 DIAGNOSIS — O34.29 PREGNANCY WITH HISTORY OF UTERINE MYOMECTOMY: Primary | ICD-10-CM

## 2024-09-27 DIAGNOSIS — O09.512 PRIMIGRAVIDA OF ADVANCED MATERNAL AGE IN SECOND TRIMESTER: ICD-10-CM

## 2024-09-27 DIAGNOSIS — O99.282 HYPOTHYROIDISM AFFECTING PREGNANCY IN SECOND TRIMESTER: ICD-10-CM

## 2024-09-27 DIAGNOSIS — O22.30 DVT (DEEP VEIN THROMBOSIS) IN PREGNANCY: ICD-10-CM

## 2024-09-27 DIAGNOSIS — Z23 ENCOUNTER FOR IMMUNIZATION: ICD-10-CM

## 2024-09-27 PROBLEM — Z83.2 FAMILY HISTORY OF HYPERCOAGULABLE STATE: Status: ACTIVE | Noted: 2024-09-27

## 2024-09-27 PROCEDURE — 90480 ADMN SARSCOV2 VAC 1/ONLY CMP: CPT | Performed by: OBSTETRICS & GYNECOLOGY

## 2024-09-27 PROCEDURE — 99000 SPECIMEN HANDLING OFFICE-LAB: CPT | Performed by: OBSTETRICS & GYNECOLOGY

## 2024-09-27 PROCEDURE — 36415 COLL VENOUS BLD VENIPUNCTURE: CPT | Performed by: OBSTETRICS & GYNECOLOGY

## 2024-09-27 PROCEDURE — 91320 SARSCV2 VAC 30MCG TRS-SUC IM: CPT | Performed by: OBSTETRICS & GYNECOLOGY

## 2024-09-27 PROCEDURE — 82105 ALPHA-FETOPROTEIN SERUM: CPT | Mod: 90 | Performed by: OBSTETRICS & GYNECOLOGY

## 2024-09-27 PROCEDURE — 99207 PR COMPLICATED OB VISIT: CPT | Performed by: OBSTETRICS & GYNECOLOGY

## 2024-09-27 RX ORDER — OXYTOCIN 10 [USP'U]/ML
10 INJECTION, SOLUTION INTRAMUSCULAR; INTRAVENOUS
OUTPATIENT
Start: 2024-09-27

## 2024-09-27 RX ORDER — LIDOCAINE 40 MG/G
CREAM TOPICAL
OUTPATIENT
Start: 2024-09-27

## 2024-09-27 RX ORDER — ACETAMINOPHEN 325 MG/1
975 TABLET ORAL ONCE
OUTPATIENT
Start: 2024-09-27 | End: 2024-09-27

## 2024-09-27 RX ORDER — OXYTOCIN/0.9 % SODIUM CHLORIDE 30/500 ML
100-340 PLASTIC BAG, INJECTION (ML) INTRAVENOUS CONTINUOUS PRN
OUTPATIENT
Start: 2024-09-27

## 2024-09-27 RX ORDER — TRANEXAMIC ACID 10 MG/ML
1 INJECTION, SOLUTION INTRAVENOUS EVERY 30 MIN PRN
OUTPATIENT
Start: 2024-09-27

## 2024-09-27 RX ORDER — METHYLERGONOVINE MALEATE 0.2 MG/ML
200 INJECTION INTRAVENOUS
OUTPATIENT
Start: 2024-09-27

## 2024-09-27 RX ORDER — OXYTOCIN/0.9 % SODIUM CHLORIDE 30/500 ML
340 PLASTIC BAG, INJECTION (ML) INTRAVENOUS CONTINUOUS PRN
OUTPATIENT
Start: 2024-09-27

## 2024-09-27 RX ORDER — SODIUM CHLORIDE, SODIUM LACTATE, POTASSIUM CHLORIDE, CALCIUM CHLORIDE 600; 310; 30; 20 MG/100ML; MG/100ML; MG/100ML; MG/100ML
INJECTION, SOLUTION INTRAVENOUS CONTINUOUS
OUTPATIENT
Start: 2024-09-27

## 2024-09-27 RX ORDER — MISOPROSTOL 200 UG/1
800 TABLET ORAL
OUTPATIENT
Start: 2024-09-27

## 2024-09-27 RX ORDER — LOPERAMIDE HYDROCHLORIDE 2 MG/1
2 CAPSULE ORAL
OUTPATIENT
Start: 2024-09-27

## 2024-09-27 RX ORDER — LOPERAMIDE HYDROCHLORIDE 2 MG/1
4 CAPSULE ORAL
OUTPATIENT
Start: 2024-09-27

## 2024-09-27 RX ORDER — CEFAZOLIN SODIUM/WATER 2 G/20 ML
2 SYRINGE (ML) INTRAVENOUS SEE ADMIN INSTRUCTIONS
OUTPATIENT
Start: 2024-09-27

## 2024-09-27 RX ORDER — MISOPROSTOL 200 UG/1
400 TABLET ORAL
OUTPATIENT
Start: 2024-09-27

## 2024-09-27 RX ORDER — CITRIC ACID/SODIUM CITRATE 334-500MG
30 SOLUTION, ORAL ORAL
OUTPATIENT
Start: 2024-09-27

## 2024-09-27 RX ORDER — CARBOPROST TROMETHAMINE 250 UG/ML
250 INJECTION, SOLUTION INTRAMUSCULAR
OUTPATIENT
Start: 2024-09-27

## 2024-09-27 RX ORDER — CEFAZOLIN SODIUM/WATER 2 G/20 ML
2 SYRINGE (ML) INTRAVENOUS
OUTPATIENT
Start: 2024-09-27

## 2024-09-27 NOTE — PROGRESS NOTES
No c/o's. COVID shot today. Flu shot next visit or sooner outside clinic if desired. On Lovenox managed by [facility]. On baby ASA also. MsAFP today. Had MFM Lvl 2 U/S scheduled. Schedule C/S 1/31/2025. RTC 4 weeks. Check TSH at next visit.    Encounter Diagnoses   Name Primary?    Pregnancy with history of uterine myomectomy Yes    DVT (deep vein thrombosis) in pregnancy     Pregnancy resulting from in vitro fertilization in second trimester     Primigravida of advanced maternal age in second trimester     Hypothyroidism affecting pregnancy in second trimester        Risk factors listed above are stable and being addressed as noted.    Dann Yee MD  [facility] [place]

## 2024-09-27 NOTE — TELEPHONE ENCOUNTER
Type of surgery:  section  Location of surgery: [facility]  Date and time of surgery: 25 @ 7:30 am  Surgeon: Dr. Yee  Pre-Op Appt Date: @ prenatal appointment  Post-Op Appt Date: 3/14/25   Packet sent out: Yes  Pre-cert/Authorization completed:  No  Date: 24

## 2024-09-27 NOTE — TELEPHONE ENCOUNTER
Patient Name: Anahi Hannah   MRN: [id number]   Case#: [id number]   Surgeons and Role:      * Dann Yee MD - Primary   Date requested: 2025   Location: [facility] L+[facility]   Procedure(s):    SECTION (N/A)

## 2024-09-27 NOTE — NURSING NOTE
"Chief Complaint   Patient presents with    Prenatal Care     18 weeks 2 days- constipated        Initial /86 (BP Location: Right arm, Patient Position: Sitting, Cuff Size: Adult Regular)   Ht 1.753 m (5' 9\")   Wt 90.3 kg (199 lb)   LMP  (LMP Unknown)   BMI 29.39 kg/m   Estimated body mass index is 29.39 kg/m  as calculated from the following:    Height as of this encounter: 1.753 m (5' 9\").    Weight as of this encounter: 90.3 kg (199 lb).  BP completed using cuff size: regular    Questioned patient about current smoking habits.  Pt. has never smoked.          The following HM Due: NONE    18w2d  Amando Combs CMA                "

## 2024-09-30 LAB
# FETUSES US: NORMAL
AFP MOM SERPL: 2.31
AFP SERPL-MCNC: 78 NG/ML
AGE - REPORTED: 26.9 YR
CURRENT SMOKER: NO
FAMILY MEMBER DISEASES HX: NO
GA METHOD: NORMAL
GA: NORMAL WK
IDDM PATIENT QL: NO
INTEGRATED SCN PATIENT-IMP: NORMAL
SPECIMEN DRAWN SERPL: NORMAL

## 2024-10-02 ENCOUNTER — HOSPITAL ENCOUNTER (OUTPATIENT)
Dept: ULTRASOUND IMAGING | Facility: CLINIC | Age: 44
Discharge: HOME | End: 2024-10-02
Attending: OBSTETRICS & GYNECOLOGY
Payer: COMMERCIAL

## 2024-10-02 ENCOUNTER — OFFICE VISIT (OUTPATIENT)
Dept: MATERNAL FETAL MEDICINE | Facility: CLINIC | Age: 44
End: 2024-10-02
Attending: OBSTETRICS & GYNECOLOGY
Payer: COMMERCIAL

## 2024-10-02 DIAGNOSIS — E03.9 HYPOTHYROIDISM DURING PREGNANCY IN SECOND TRIMESTER: ICD-10-CM

## 2024-10-02 DIAGNOSIS — O09.812 PREGNANCY RESULTING FROM IN VITRO FERTILIZATION, SECOND TRIMESTER: Primary | ICD-10-CM

## 2024-10-02 DIAGNOSIS — O99.282 HYPOTHYROIDISM DURING PREGNANCY IN SECOND TRIMESTER: ICD-10-CM

## 2024-10-02 DIAGNOSIS — Z36.2 ENCOUNTER FOR FOLLOW-UP ULTRASOUND OF FETAL ANATOMY: ICD-10-CM

## 2024-10-02 DIAGNOSIS — O26.90 PREGNANCY RELATED CONDITION, ANTEPARTUM: ICD-10-CM

## 2024-10-02 DIAGNOSIS — Z36.0 ENCOUNTER FOR ANTENATAL SCREENING FOR CHROMOSOMAL ANOMALIES: Primary | ICD-10-CM

## 2024-10-02 DIAGNOSIS — O43.199 MARGINAL INSERTION OF UMBILICAL CORD AFFECTING MANAGEMENT OF MOTHER: ICD-10-CM

## 2024-10-02 DIAGNOSIS — O09.512 PRIMIGRAVIDA OF ADVANCED MATERNAL AGE IN SECOND TRIMESTER: ICD-10-CM

## 2024-10-02 DIAGNOSIS — Q27.0 SINGLE UMBILICAL ARTERY: ICD-10-CM

## 2024-10-02 PROBLEM — O09.899 TWO VESSEL UMBILICAL CORD, ANTEPARTUM: Status: ACTIVE | Noted: 2024-10-02

## 2024-10-02 PROCEDURE — 99212 OFFICE O/P EST SF 10 MIN: CPT | Performed by: STUDENT IN AN ORGANIZED HEALTH CARE EDUCATION/TRAINING PROGRAM

## 2024-10-02 PROCEDURE — 76811 OB US DETAILED SNGL FETUS: CPT | Mod: 26 | Performed by: OBSTETRICS & GYNECOLOGY

## 2024-10-02 PROCEDURE — 96040 HC GENETIC COUNSELING, EACH 30 MINUTES: CPT

## 2024-10-02 PROCEDURE — 76811 OB US DETAILED SNGL FETUS: CPT

## 2024-10-02 PROCEDURE — 99203 OFFICE O/P NEW LOW 30 MIN: CPT | Mod: 25 | Performed by: OBSTETRICS & GYNECOLOGY

## 2024-10-02 NOTE — PROGRESS NOTES
[facility]  Genetic Counseling Consult    Patient:  Anahi Hannah YOB: 1980   Date of Service:  10/02/24   MRN: [id number]    Anahi was seen at the [facility] for genetic consultation. The indication for genetic counseling is personal medical history and IVF pregnancy . The patient was accompanied to this visit by their partner, Khurram.    IMPRESSION/ PLAN   1. Anahi has not had genetic screening in this pregnancy and declines options discussed today.    2. During today's [facility] visit, Anahi had a genetic counseling session only. Screening and diagnostic testing was discussed and declined.     3. Anahi had a level II comprehensive anatomy ultrasound today. Please see the ultrasound report for further details.    4. Further recommendation include a follow-up ultrasound with [facility]. The upcoming ultrasound has been scheduled for 10/25/2024.    PREGNANCY HISTORY   /Parity:       This is Anahi's first pregnancy.    CURRENT PREGNANCY   Current Age: 43 year old     Age at Delivery: 44 year old    HANNA: 2025, by Other Basis                                     Gestational Age: 19w0d    This pregnancy is a single gestation.     This pregnancy was conceived with in vitro fertilization (IVF). The following was discussed:     Embryo transfer date: 6/10/2024  Number of transferred embryos: 1  Use of egg or sperm donor: donor egg  Age of egg retrieval: 24  Frozen 5 day transfer  Preimplantation genetic testing (PGT) was performed on the embryos and the results were normal (per patient report, results not available for review)  PGT-A is a screening test. Therefore, a normal PGT-A result significantly reduces but does not eliminate the possibility of aneuploidy. Invasive testing (such as amniocentesis) is recommended if the patient desires definitive information regarding aneuploidy in pregnancy. We discussed the limitation  of NIPT following PGT-A and that pursuing multiple screening tests may result in conflicting information in which case the option of invasive testing would be reviewed again.  Fetal echocardiogram will be recommended and scheduled after the 18-20 week fetal anatomy ultrasound  The average pregnancy has a 0.5-1.0% chance of a congenital heart defect. However, studies suggest an increased chance for a heart defect for IVF pregnancies, with estimates ranging from 1-3.3%. Fetal echocardiograms are typically performed at 20 to 24 weeks gestation.    Anahi reports no complications, illnesses, fever or exposure concerns with this pregnancy. Anahi reported she had a subchorionic hemorrhage earlier in the pregnancy.      MEDICAL HISTORY   Anahi s reported medical history includes but is not limited to: hypothyroidism, DVT, uterine leiomyoma, heterozygote for factor V Leiden and congenital hip dysplasia. I encouraged Anahi to share her family history with the child's pediatrician.     Factor V Leiden thrombophilia is an inherited blood clotting disorder caused by a particular mutation in the F5 gene that provides instructions for a protein called coagulator factor V. Individuals with factor V leiden thrombophilia are at higher risk of developing a deep venous thrombosis (DVT) which can occur throughout the body. If a blood clot travels through the bloodstream and lodges in the lung, called a pulmonary emboli, it can be life threatening. The factor V Leiden mutation has been associated with an increased risk of miscarriage, and possibly with pregnancy complications. However, most women with the factor V Leiden mutation have normal pregnancies and only about 10% of individuals with the factor V Leiden mutation ever develop abnormal clots.    Factor V Leiden is the most common inherited form of thrombophilia. In fact, 3-8% of individuals of  ancestry have one copy of the factor V Leiden mutation and about 1 in 5,000 have two  "copies.The risk of an abnormal clot depends on whether a person has one (heterozygous) or two (homozygous) copies of the factor V Leiden mutation:  The general population risk of abnormal clot is 1 in 1,000 (0.1%)  The risk of a heterozygote individual is 3-9 in 1,000 (0.3-0.8%)  The risk of a homozygote individual is 80 in 1,000 (8%)    FAMILY HISTORY   A three-generation pedigree was obtained today and is scanned under the \"Media\" tab in Epic. The family history was reported by Anahi and their partner.    The following significant findings were reported today:   Anahi's maternal cousin also is a heterozygote for Factor V Leiden   The father of the pregnancy, Khurram, is healthy.   Khurram's father had a liposarcoma   Khurram's maternal aunt has arthritis   Maternal cousin has early onset dementia    I encouraged the couple share their family histories with their primary care providers.     Otherwise, the reported family history is unremarkable for multiple miscarriages, stillbirths, birth defects, intellectual disabilities, and consanguinity.       RISK ASSESSMENT FOR CHROMOSOME CONDITIONS   We explained that the risk for fetal chromosome abnormalities increases with maternal age. We discussed specific features of common chromosome abnormalities, including Down syndrome, trisomy 13, trisomy 18, and sex chromosome trisomies.    At age 24 at midtrimester, the risk to have a baby with Down syndrome is 1 in 990.  At age 24 at midtrimester, the risk to have a baby with any chromosome abnormality is 1 in 495.     Anahi has not had genetic screening in this pregnancy and declines options discussed today.     RISK ASSESSMENT FOR INHERITED CONDITIONS   We discussed that every pregnancy has a chance to have an inherited single-gene condition, even when there is no family history of that condition. In fact, approximately 90% of couples at an increased reproductive risk for an inherited condition have no family history of that " condition. The average person may be a carrier for 5-10 different genetic variants that can increase the chance for their pregnancies to have that condition. We discussed autosomal recessive conditions and X-linked conditions. Autosomal recessive conditions happen when a mutation has been inherited from the egg and sperm and include conditions like cystic fibrosis, thalassemia, hearing loss, spinal muscular atrophy, and more. X-linked conditions happen when a mutation has been inherited from the egg and include conditions like fragile X syndrome.     We reviewed that when both biological parents carry a harmful genetic change in a gene associated with autosomal recessive inheritance, each of their pregnancies has a 1 in 4 (25%) chance to be affected by that condition. With x-linked conditions, the specific risk generally depends on the chromosomal sex of the fetus, with XY individuals (generally male) being most severely affected.      screening was reviewed. [facility] [place] [facility]    Khurram completed carrier screening and was found to be a carrier of cystic fibrosis. The couple shared that the egg donor completed screening and was not found to be a carrier of cystic fibrosis. Reviewed all of Khurram's children will have a 50% chance of being a carrier as well as his siblings.   GENETIC TESTING OPTIONS   Genetic testing during a pregnancy includes screening and diagnostic procedures.      Screening tests are non-invasive which means no risk to the pregnancy and includes ultrasounds and blood work. The benefits and limitations of screening were reviewed. Screening tests provide a risk assessment (chance) specific to the pregnancy for certain fetal chromosome abnormalities but cannot definitively diagnose or exclude a fetal chromosome abnormality. Follow-up genetic counseling and consideration of diagnostic testing is recommended with any abnormal screening result. Diagnostic testing during a pregnancy is  more certain and can test for more conditions. However, the tests do have a risk of miscarriage that requires careful consideration. These tests can detect fetal chromosome abnormalities with greater than 99% certainty. Results can be compromised by maternal cell contamination or mosaicism and are limited by the resolution of current genetic testing technology.     There is no screening or diagnostic test that detects all forms of birth defects or intellectual disability.     We discussed the following screening options:     Non-invasive prenatal testing (NIPT)  Also called cell-free DNA screening because it detect chromosome fragments from the placenta in the pregnant person's blood.  Can be done any time after 10 weeks gestation.  Screens for trisomy 21, trisomy 18, trisomy 13, and sex chromosome aneuploidies. [facility]CM[facility] also recommends consideration of screening for 22q11.2 microdeletion syndrome.  Does not screen for all known chromosomal conditions.  Even with negative results, a residual risk for screened conditions remains.  Cannot screen for open neural tube defects, maternal serum AFP after 15 weeks is recommended    Carrier screening  Risk assessment for certain autosomal recessive and x-linked conditions. These conditions are generally infantile- or childhood-onset conditions.   Can be done any time during the pregnancy or prior to pregnancy.  Can screen for over 500 different genetic conditions.  Is not intended to diagnosis a condition in the carrier parent.    Even with negative results, a residual risk for screened conditions remains.      We discussed the following ultrasound options:  Comprehensive level II ultrasound (Fetal Anatomy Ultrasound)  Ultrasound done between 18-20 weeks gestation  Screens for major birth defects and markers for aneuploidy (like trisomy 21 and trisomy 18)  Includes looking at the fetus/baby's growth, heart, organs (stomach, kidneys), placenta, and amniotic fluid  Fetal  Echocardiogram  Ultrasound done between 22-24 weeks gestation  Screen for heart defects  Recommended if there are concerns about the heart or other indications like IVF pregnancy or maternal diabetes    We discussed the following diagnostic options:   Amniocentesis  Invasive diagnostic procedure done after 15 weeks gestation  The procedure collects a small sample of amniotic fluid for the purpose of chromosomal testing and/or other genetic testing  Diagnostic result; more than 99% sensitivity for fetal chromosome abnormalities  Testing for AFP in the amniotic fluid can test for open neural tube defects      It was a pleasure to be involved with Anahi [facility]. Face-to-face time of the meeting was 45 minutes.    AJAY MAYES MS, [facility]  Genetic Counselor  [facility]  Maternal Fetal Medicine  Office: [phone]   [facility]: [phone]   Fax: [phone]  [facility]

## 2024-10-02 NOTE — PROGRESS NOTES
The patient was seen for an ultrasound in the M[facility]etal Medicine Center at the [facility] today.  For a detailed report of the ultrasound examination, please see the ultrasound report which can be found under the imaging tab.    If you have questions regarding today's evaluation or if we can be of further service, please contact the Maternal-Fetal Medicine Center.    Fanny Dale MD  , OB/GYN  Maternal-Fetal Medicine

## 2024-10-02 NOTE — NURSING NOTE
Anahi presents to [facility] for genetic counseling and level II ultrasound. Patient reports she does not yet appreciate fetal movement, denies contractions, leaking of fluid, or bleeding. SBAR given to DIONISIO MD, see their note in Epic.

## 2024-10-03 ENCOUNTER — TELEPHONE (OUTPATIENT)
Dept: OBGYN | Facility: CLINIC | Age: 44
End: 2024-10-03
Payer: COMMERCIAL

## 2024-10-03 NOTE — TELEPHONE ENCOUNTER
Form received from: via message from pt; Anahi Hannah     Form requesting following info/need: AMANDA forms for [facility]     ALCON needed?: NA     Location of form: forms basket     When completed the route for return: please fax to number on form

## 2024-10-25 ENCOUNTER — HOSPITAL ENCOUNTER (OUTPATIENT)
Dept: ULTRASOUND IMAGING | Facility: CLINIC | Age: 44
Discharge: HOME | End: 2024-10-25
Attending: STUDENT IN AN ORGANIZED HEALTH CARE EDUCATION/TRAINING PROGRAM
Payer: COMMERCIAL

## 2024-10-25 ENCOUNTER — LAB (OUTPATIENT)
Dept: LAB | Facility: CLINIC | Age: 44
End: 2024-10-25
Attending: STUDENT IN AN ORGANIZED HEALTH CARE EDUCATION/TRAINING PROGRAM
Payer: COMMERCIAL

## 2024-10-25 ENCOUNTER — OFFICE VISIT (OUTPATIENT)
Dept: MATERNAL FETAL MEDICINE | Facility: CLINIC | Age: 44
End: 2024-10-25
Attending: STUDENT IN AN ORGANIZED HEALTH CARE EDUCATION/TRAINING PROGRAM
Payer: COMMERCIAL

## 2024-10-25 ENCOUNTER — PRENATAL OFFICE VISIT (OUTPATIENT)
Dept: OBGYN | Facility: CLINIC | Age: 44
End: 2024-10-25
Payer: COMMERCIAL

## 2024-10-25 VITALS — BODY MASS INDEX: 29.53 KG/M2 | SYSTOLIC BLOOD PRESSURE: 124 MMHG | WEIGHT: 200 LBS | DIASTOLIC BLOOD PRESSURE: 78 MMHG

## 2024-10-25 DIAGNOSIS — O09.512 PRIMIGRAVIDA OF ADVANCED MATERNAL AGE IN SECOND TRIMESTER: ICD-10-CM

## 2024-10-25 DIAGNOSIS — O99.282 HYPOTHYROIDISM AFFECTING PREGNANCY IN SECOND TRIMESTER: ICD-10-CM

## 2024-10-25 DIAGNOSIS — O43.192 MARGINAL INSERTION OF UMBILICAL CORD AFFECTING MANAGEMENT OF MOTHER IN SECOND TRIMESTER: Primary | ICD-10-CM

## 2024-10-25 DIAGNOSIS — O09.812 PREGNANCY RESULTING FROM IN VITRO FERTILIZATION IN SECOND TRIMESTER: ICD-10-CM

## 2024-10-25 DIAGNOSIS — O09.899 TWO VESSEL UMBILICAL CORD, ANTEPARTUM: ICD-10-CM

## 2024-10-25 DIAGNOSIS — O09.899 TWO VESSEL UMBILICAL CORD IN SINGLETON PREGNANCY, ANTEPARTUM: ICD-10-CM

## 2024-10-25 DIAGNOSIS — O22.30 DVT (DEEP VEIN THROMBOSIS) IN PREGNANCY: ICD-10-CM

## 2024-10-25 DIAGNOSIS — N97.9 FEMALE INFERTILITY: ICD-10-CM

## 2024-10-25 DIAGNOSIS — O09.512 PRIMIGRAVIDA OF ADVANCED MATERNAL AGE IN SECOND TRIMESTER: Primary | ICD-10-CM

## 2024-10-25 DIAGNOSIS — O34.29 PREGNANCY WITH HISTORY OF UTERINE MYOMECTOMY: ICD-10-CM

## 2024-10-25 DIAGNOSIS — O99.282 HYPOTHYROIDISM AFFECTING PREGNANCY IN SECOND TRIMESTER: Primary | ICD-10-CM

## 2024-10-25 DIAGNOSIS — O09.812 PREGNANCY RESULTING FROM IN VITRO FERTILIZATION, SECOND TRIMESTER: ICD-10-CM

## 2024-10-25 DIAGNOSIS — O34.29 PREGNANCY WITH HISTORY OF UTERINE MYOMECTOMY: Primary | ICD-10-CM

## 2024-10-25 DIAGNOSIS — E03.9 HYPOTHYROIDISM AFFECTING PREGNANCY IN SECOND TRIMESTER: ICD-10-CM

## 2024-10-25 DIAGNOSIS — O43.199 MARGINAL INSERTION OF UMBILICAL CORD AFFECTING MANAGEMENT OF MOTHER: ICD-10-CM

## 2024-10-25 DIAGNOSIS — E03.9 HYPOTHYROIDISM AFFECTING PREGNANCY IN SECOND TRIMESTER: Primary | ICD-10-CM

## 2024-10-25 DIAGNOSIS — Z36.2 ENCOUNTER FOR FOLLOW-UP ULTRASOUND OF FETAL ANATOMY: ICD-10-CM

## 2024-10-25 LAB — TSH SERPL DL<=0.005 MIU/L-ACNC: 2.95 UIU/ML (ref 0.3–4.2)

## 2024-10-25 PROCEDURE — 36415 COLL VENOUS BLD VENIPUNCTURE: CPT

## 2024-10-25 PROCEDURE — 76827 ECHO EXAM OF FETAL HEART: CPT | Mod: 26 | Performed by: STUDENT IN AN ORGANIZED HEALTH CARE EDUCATION/TRAINING PROGRAM

## 2024-10-25 PROCEDURE — 93325 DOPPLER ECHO COLOR FLOW MAPG: CPT

## 2024-10-25 PROCEDURE — 84443 ASSAY THYROID STIM HORMONE: CPT

## 2024-10-25 PROCEDURE — 76825 ECHO EXAM OF FETAL HEART: CPT | Mod: 26 | Performed by: STUDENT IN AN ORGANIZED HEALTH CARE EDUCATION/TRAINING PROGRAM

## 2024-10-25 PROCEDURE — 76816 OB US FOLLOW-UP PER FETUS: CPT

## 2024-10-25 PROCEDURE — 76816 OB US FOLLOW-UP PER FETUS: CPT | Mod: 26 | Performed by: STUDENT IN AN ORGANIZED HEALTH CARE EDUCATION/TRAINING PROGRAM

## 2024-10-25 PROCEDURE — 99207 PR COMPLICATED OB VISIT: CPT | Performed by: OBSTETRICS & GYNECOLOGY

## 2024-10-25 PROCEDURE — 999N000069 HC STATISTIC GENETIC COUNSELING, < 16 MIN

## 2024-10-25 PROCEDURE — 93325 DOPPLER ECHO COLOR FLOW MAPG: CPT | Mod: 26 | Performed by: STUDENT IN AN ORGANIZED HEALTH CARE EDUCATION/TRAINING PROGRAM

## 2024-10-25 RX ORDER — LEVOTHYROXINE SODIUM 50 UG/1
50 TABLET ORAL DAILY
Qty: 90 TABLET | Refills: 2 | Status: SHIPPED | OUTPATIENT
Start: 2024-10-25

## 2024-10-25 NOTE — NURSING NOTE
Patient reports fetal movement. Has intermittent cramping, discomforts. Denies contractions, leaking of fluid, or bleeding.  Patient denies headache, visual changes, nausea/vomiting, epigastric pain related to preeclampsia. SBAR given to DANIELLE BRUNSON, see their note in Epic.

## 2024-10-25 NOTE — PROGRESS NOTES
No c/o's. Had MFM U/S today, report pending. Has NIPT today through [facility]. On Lovenox for DVT, also baby ASA. On Synthroid, check TSH today. C/S 1/31/2025 for Hx myomectomy scheduled. 28 wk lab at next visit. RTC 4 weeks.    Encounter Diagnoses   Name Primary?    Pregnancy with history of uterine myomectomy Yes    DVT (deep vein thrombosis) in pregnancy     Two vessel umbilical cord, antepartum     Marginal insertion of umbilical cord affecting management of mother     Hypothyroidism affecting pregnancy in second trimester     Pregnancy resulting from in vitro fertilization in second trimester     Primigravida of advanced maternal age in second trimester        Risk factors listed above are stable and being addressed as noted.    Dann Yee MD  [facility] [place]

## 2024-10-25 NOTE — NURSING NOTE
"Chief Complaint   Patient presents with    Prenatal Care     22 weeks 2 days- no concerns        Initial /78 (BP Location: Right arm, Patient Position: Sitting, Cuff Size: Adult Regular)   Wt 90.7 kg (200 lb)   LMP  (LMP Unknown)   BMI 29.53 kg/m   Estimated body mass index is 29.53 kg/m  as calculated from the following:    Height as of 24: 1.753 m (5' 9\").    Weight as of this encounter: 90.7 kg (200 lb).  BP completed using cuff size: regular    Questioned patient about current smoking habits.  Pt. has never smoked.          The following HM Due: NONE    22w2d  Amando Combs CMA                "

## 2024-10-25 NOTE — PROGRESS NOTES
The patient was seen for an ultrasound in the M[facility]-Fetal Medicine Center clinic today.  For a detailed report of the ultrasound examination, please see the ultrasound report which can be found under the imaging tab.    If you have questions regarding today's evaluation or if we can be of further service, please contact the Maternal-Fetal Medicine Center.    Gwen Christie M.D.  Maternal Fetal-Medicine Specialist

## 2024-10-25 NOTE — PROGRESS NOTES
[facility]  Genetic Counseling Consult    Patient:  Anahi Hannah YOB: 1980   Date of Service:  10/25/24      Anahi Hannah was seen at the [facility] for genetic consultation at the request of Dr.Sereen Christie due to patient requesting NIPT. Anahi was accompanied to today's visit by her partner, Khurram. I previously met with the couple and had a full genetic counseling session on 10/2/2024 please see note for further details.        Impression/Plan:    During today's [facility] visit, Anahi had a blood draw for expanded non-invasive prenatal testing (also called NIPT, NIPS, or cell-free DNA) through [facility] The expanded NIPT screens for trisomy 21, 18, and 13 and select microdeletion syndromes, including 22q11.2 deletion syndrome. The patient opted to screen for sex chromosome aneuploidies, including reported fetal sex. Results are expected in 7-14 days. The patient will be called with results and if they do not answer they requested a detailed message with results on their voicemail, including the predicted fetal sex information.  Patient was informed that results, including fetal sex, will be available in [place].  Anahi had an ultrasound today. Please see the ultrasound report for further details.    Discussion     This pregnancy was conceived with in vitro fertilization (IVF). The following was discussed:      Embryo transfer date: 6/10/2024  Number of transferred embryos: 1  Use of egg or sperm donor: donor egg  Age of egg retrieval: 24  Frozen 5 day transfer  Preimplantation genetic testing (PGT) was performed on the embryos and the results were normal (per patient report, results not available for review)  PGT-A is a screening test. Therefore, a normal PGT-A result significantly reduces but does not eliminate the possibility of aneuploidy. Invasive testing (such as amniocentesis) is recommended if the patient  desires definitive information regarding aneuploidy in pregnancy. We discussed the limitation of NIPT following PGT-A and that pursuing multiple screening tests may result in conflicting information in which case the option of invasive testing would be reviewed again.  Fetal echocardiogram will be recommended and scheduled after the 18-20 week fetal anatomy ultrasound  The average pregnancy has a 0.5-1.0% chance of a congenital heart defect. However, studies suggest an increased chance for a heart defect for IVF pregnancies, with estimates ranging from 1-3.3%. Fetal echocardiograms are typically performed at 20 to 24 weeks gestation.  We explained that the risk for fetal chromosome abnormalities increases with maternal age. We discussed specific features of common chromosome abnormalities, including Down syndrome, trisomy 13, trisomy 18, and sex chromosome trisomies.     At age 24 at midtrimester, the risk to have a baby with Down syndrome is 1 in 990.  At age 24 at midtrimester, the risk to have a baby with any chromosome abnormality is 1 in 495.    Anahi elected to do NIPT today.   Testing Options:   We discussed the following options:   Non-invasive Prenatal Testing (NIPT)  Maternal plasma cell-free DNA testing; first trimester ultrasound with nuchal translucency and nasal bone assessment is recommended, when appropriate  Screens for fetal trisomy 21, trisomy 13, trisomy 18, and sex chromosome aneuploidy  Cannot screen for open neural tube defects; maternal serum AFP after 15 weeks is recommended    We reviewed the benefits and limitations of this testing.  Screening tests provide a risk assessment specific to the pregnancy for certain fetal chromosome abnormalities, but cannot definitively diagnose or exclude a fetal chromosome abnormality.  Follow-up genetic counseling and consideration of diagnostic testing is recommended with any abnormal screening result.     Diagnostic tests carry inherent risks- including  risk of miscarriage- that require careful consideration.  These tests can detect fetal chromosome abnormalities with greater than 99% certainty.  Results can be compromised by maternal cell contamination or mosaicism, and are limited by the resolution of cytogenetic G-banding technology.  There is no screening nor diagnostic test that can detect all forms of birth defects or mental disability.    It was a pleasure to be involved with Anahi [facility]earns [facility]. Face-to-face time of the meeting was 10 minutes.  AJAY MAYES MS, [facility]  Genetic Counselor  [facility]  Maternal Fetal Medicine  Office: [phone]   [facility]: [phone]   Fax: [phone]  [facility]

## 2024-11-04 ENCOUNTER — TELEPHONE (OUTPATIENT)
Dept: MATERNAL FETAL MEDICINE | Facility: CLINIC | Age: 44
End: 2024-11-04
Payer: COMMERCIAL

## 2024-11-04 DIAGNOSIS — O43.199 MARGINAL INSERTION OF UMBILICAL CORD AFFECTING MANAGEMENT OF MOTHER: ICD-10-CM

## 2024-11-04 DIAGNOSIS — O22.30 DVT (DEEP VEIN THROMBOSIS) IN PREGNANCY: ICD-10-CM

## 2024-11-04 DIAGNOSIS — O09.899 TWO VESSEL UMBILICAL CORD, ANTEPARTUM: ICD-10-CM

## 2024-11-04 DIAGNOSIS — O09.812 PREGNANCY RESULTING FROM IN VITRO FERTILIZATION IN SECOND TRIMESTER: ICD-10-CM

## 2024-11-04 DIAGNOSIS — O34.29 PREGNANCY WITH HISTORY OF UTERINE MYOMECTOMY: Primary | ICD-10-CM

## 2024-11-04 DIAGNOSIS — O09.512 PRIMIGRAVIDA OF ADVANCED MATERNAL AGE IN SECOND TRIMESTER: ICD-10-CM

## 2024-11-04 LAB — SCANNED LAB RESULT: NORMAL

## 2024-11-04 NOTE — TELEPHONE ENCOUNTER
November 4, 2024      Left a message for Anahi regarding her Prequel (NIPT) results through [employer].     Results indicate NO ANEUPLOIDY DETECTED for chromosomes 21, 18, 13, or sex chromosomes (XY - male predicted fetal sex). Results also indicate NO MICRODELETION DETECTED for the 22q11.2 or other select microdeletions (15q11.2, 1p36, 4p, and 5p).    This puts her current pregnancy at low risk for Down syndrome, trisomy 18, trisomy 13 and sex chromosome abnormalities. This test is reported to have the following sensitivities: Down syndrome: 99.7%, trisomy 18: 97.9%, trisomy 13: 99.0%, monosomy X: 95.8%, XX: 97.6%, and XY: 99.1%. Although these results are reassuring, this does not replace a standard chromosome analysis from a chorionic villus sampling or amniocentesis. The results also reduce, but do not eliminate, the risk for 22q11.2 deletion syndrome and other select microdeletions.    This information was left in Anahi's voicemail per plan established at her genetic counseling appointment, and Anahi was encouraged to reach out if she has any questions or concerns.      Her results are available in her Epic chart for her primary OB to review.    AJAY MAYES MS, [facility]  Genetic Counselor  [facility]  Maternal Fetal Medicine  Office: [phone]   [facility]: [phone]   Fax: [phone]  [facility]

## 2024-11-07 ENCOUNTER — TRANSFERRED RECORDS (OUTPATIENT)
Dept: HEALTH INFORMATION MANAGEMENT | Facility: CLINIC | Age: 44
End: 2024-11-07
Payer: COMMERCIAL

## 2024-11-15 ENCOUNTER — OFFICE VISIT (OUTPATIENT)
Dept: MATERNAL FETAL MEDICINE | Facility: CLINIC | Age: 44
End: 2024-11-15
Attending: STUDENT IN AN ORGANIZED HEALTH CARE EDUCATION/TRAINING PROGRAM
Payer: COMMERCIAL

## 2024-11-15 ENCOUNTER — HOSPITAL ENCOUNTER (OUTPATIENT)
Dept: ULTRASOUND IMAGING | Facility: CLINIC | Age: 44
Discharge: HOME | End: 2024-11-15
Attending: STUDENT IN AN ORGANIZED HEALTH CARE EDUCATION/TRAINING PROGRAM
Payer: COMMERCIAL

## 2024-11-15 DIAGNOSIS — O09.812 PREGNANCY RESULTING FROM IN VITRO FERTILIZATION, SECOND TRIMESTER: ICD-10-CM

## 2024-11-15 DIAGNOSIS — Z36.2 ENCOUNTER FOR FOLLOW-UP ULTRASOUND OF FETAL ANATOMY: ICD-10-CM

## 2024-11-15 DIAGNOSIS — Z36.2 ENCOUNTER FOR FOLLOW-UP ULTRASOUND OF FETAL ANATOMY: Primary | ICD-10-CM

## 2024-11-15 DIAGNOSIS — O43.199 MARGINAL INSERTION OF UMBILICAL CORD AFFECTING MANAGEMENT OF MOTHER: ICD-10-CM

## 2024-11-15 DIAGNOSIS — O09.899 TWO VESSEL UMBILICAL CORD, ANTEPARTUM: ICD-10-CM

## 2024-11-15 PROCEDURE — 76816 OB US FOLLOW-UP PER FETUS: CPT | Mod: 26 | Performed by: OBSTETRICS & GYNECOLOGY

## 2024-11-15 PROCEDURE — 76816 OB US FOLLOW-UP PER FETUS: CPT

## 2024-11-15 NOTE — PROGRESS NOTES
"Please see \"Imaging\" tab under \"Chart Review\" for details of today's US at the [facility].    Eleazar Zelaya MD  Maternal-Fetal Medicine    "

## 2024-11-15 NOTE — NURSING NOTE
Patient reports fetal movement, denies contractions, leaking of fluid, or bleeding.  Patient denies headache, visual changes, nausea/vomiting, epigastric pain related to preeclampsia.  SBAR given to DANIELLE BRUNSON, see their note in Epic.

## 2024-11-26 ENCOUNTER — LAB (OUTPATIENT)
Dept: LAB | Facility: CLINIC | Age: 44
End: 2024-11-26
Payer: COMMERCIAL

## 2024-11-26 ENCOUNTER — ALLIED HEALTH/NURSE VISIT (OUTPATIENT)
Dept: OBGYN | Facility: CLINIC | Age: 44
End: 2024-11-26
Payer: COMMERCIAL

## 2024-11-26 VITALS — SYSTOLIC BLOOD PRESSURE: 118 MMHG | DIASTOLIC BLOOD PRESSURE: 72 MMHG

## 2024-11-26 DIAGNOSIS — O99.810 ABNORMAL MATERNAL GLUCOSE TOLERANCE, ANTEPARTUM: ICD-10-CM

## 2024-11-26 DIAGNOSIS — O34.29 PREGNANCY WITH HISTORY OF UTERINE MYOMECTOMY: Primary | ICD-10-CM

## 2024-11-26 DIAGNOSIS — O09.813 SUPERVISION OF PREGNANCY RESULTING FROM ASSISTED REPRODUCTIVE TECHNOLOGY IN THIRD TRIMESTER: ICD-10-CM

## 2024-11-26 DIAGNOSIS — O22.30 DVT (DEEP VEIN THROMBOSIS) IN PREGNANCY: ICD-10-CM

## 2024-11-26 DIAGNOSIS — O09.819 PREGNANCY RESULTING FROM IN VITRO FERTILIZATION: ICD-10-CM

## 2024-11-26 DIAGNOSIS — O09.899 TWO VESSEL UMBILICAL CORD, ANTEPARTUM: ICD-10-CM

## 2024-11-26 DIAGNOSIS — O09.513 SUPERVISION OF ELDERLY PRIMIGRAVIDA IN THIRD TRIMESTER: ICD-10-CM

## 2024-11-26 DIAGNOSIS — O09.519 ADVANCED MATERNAL AGE, 1ST PREGNANCY: ICD-10-CM

## 2024-11-26 DIAGNOSIS — O43.199 MARGINAL INSERTION OF UMBILICAL CORD AFFECTING MANAGEMENT OF MOTHER: ICD-10-CM

## 2024-11-26 LAB
ERYTHROCYTE [DISTWIDTH] IN BLOOD BY AUTOMATED COUNT: 14 % (ref 10–15)
GESTATIONAL GTT 2 HR POST DOSE: 209 MG/DL (ref 60–154)
GESTATIONAL GTT 3 HR POST DOSE: 176 MG/DL (ref 60–139)
GLUCOSE P FAST SERPL-MCNC: 112 MG/DL (ref 60–94)
HCT VFR BLD AUTO: 30.4 % (ref 35–47)
HGB BLD-MCNC: 10.3 G/DL (ref 11.7–15.7)
MCH RBC QN AUTO: 29.6 PG (ref 26.5–33)
MCHC RBC AUTO-ENTMCNC: 33.9 G/DL (ref 31.5–36.5)
MCV RBC AUTO: 87 FL (ref 78–100)
PLATELET # BLD AUTO: 387 10E3/UL (ref 150–450)
RBC # BLD AUTO: 3.48 10E6/UL (ref 3.8–5.2)
WBC # BLD AUTO: 12.4 10E3/UL (ref 4–11)

## 2024-11-26 PROCEDURE — 99207 PR NO CHARGE NURSE ONLY: CPT

## 2024-11-26 PROCEDURE — 82951 GLUCOSE TOLERANCE TEST (GTT): CPT

## 2024-11-26 PROCEDURE — 36415 COLL VENOUS BLD VENIPUNCTURE: CPT

## 2024-11-26 PROCEDURE — 85027 COMPLETE CBC AUTOMATED: CPT

## 2024-11-26 PROCEDURE — 82952 GTT-ADDED SAMPLES: CPT

## 2024-11-26 NOTE — NURSING NOTE
Subjective:  Anahi is being seen in clinic for a blood pressure check due to  BPat last appt was elevated . Patient is Pregnant.    Patient reports taking the following antihypertensive medications: no antihypertensive medications    Patient reports the following symptoms: no hypertension symptoms.     Objective:  /72   LMP  (LMP Unknown)  Blood pressure taken on       Two blood pressures taken, at least one minute apart.     Plan:    If patient's BP is LOW (Systolic less than 100 OR Diastolic less than 60): RN triage team to be contacted ASAP for next steps.     If patient's BP is NORMAL (Systolic between 100-139 OR Diastolic between 60-89): OK for patient to leave clinic and rooming staff to route chart to ordering provider.     If patient's BP is HIGH (Systolic between 140-159 OR Diastolic between ): RN triage team to be contacted ASAP for next steps.    If patient's BP is VERY HIGH (Systolic 160 and higher OR Diastolic 110 and higher): RN triage team to be contacted ASAP for next steps.       Signs and symptoms of hypertension reviewed with patient.     Follow up at your next scheduled pn appt on 12/6.  Call with any concerning symptoms.    ORA Zaldivar OB/GYN

## 2024-11-27 DIAGNOSIS — O24.419 GESTATIONAL DIABETES: Primary | ICD-10-CM

## 2024-11-27 PROBLEM — O24.410 WHITE CLASSIFICATION A1 GESTATIONAL DIABETES MELLITUS (GDM), DIET CONTROLLED: Status: ACTIVE | Noted: 2024-11-27

## 2024-11-27 LAB — GESTATIONAL GTT 1 HR POST DOSE: 233 MG/DL (ref 60–179)

## 2024-12-02 ENCOUNTER — MYC MEDICAL ADVICE (OUTPATIENT)
Dept: EDUCATION SERVICES | Facility: CLINIC | Age: 44
End: 2024-12-02

## 2024-12-02 ENCOUNTER — VIRTUAL VISIT (OUTPATIENT)
Dept: EDUCATION SERVICES | Facility: CLINIC | Age: 44
End: 2024-12-02
Attending: OBSTETRICS & GYNECOLOGY
Payer: COMMERCIAL

## 2024-12-02 DIAGNOSIS — O24.419 GESTATIONAL DIABETES: ICD-10-CM

## 2024-12-02 DIAGNOSIS — O24.410 WHITE CLASSIFICATION A1 GESTATIONAL DIABETES MELLITUS (GDM), DIET CONTROLLED: Primary | ICD-10-CM

## 2024-12-02 PROCEDURE — G0109 DIAB MANAGE TRN IND/GROUP: HCPCS | Mod: 95

## 2024-12-02 RX ORDER — LANCETS
EACH MISCELLANEOUS
Qty: 200 EACH | Refills: 1 | Status: SHIPPED | OUTPATIENT
Start: 2024-12-02

## 2024-12-02 NOTE — LETTER
12/2/2024         RE: Anahi Hannah  [id number] [facility] [place] [address]        Dear Colleague,    Thank you for referring your patient, Anahi Hannah, to the [facility]. Please see a copy of my visit note below.    No notes on file

## 2024-12-02 NOTE — PATIENT INSTRUCTIONS
Test glucose 4 times per day:   Fasting (when you first awake for the day): 95 mg/dL or below   1 hour after breakfast: 140 mg/dL or below   1 hour after lunch: 140 mg/dL or below   1 hour after dinner: 140 mg/dL or below     Please bring your meter and log book to all appointments     If you miss 1 hour after meal test, test 2 hours after the meal.  Goal 2 hours after is 120 mg/dL or below.     Short video on use of a meter: https://[email or web address].[email or web address]/watch?v=[email or web address]    2.  Check your urine ketones once a day, when you first awake for the day until they are negative to trace for 7 days in a row.  Then decrease and check once a week.     3.  Meal Plan    Breakfast: 30 grams carbohydrate + protein   Snack: 15-30 grams carbohydrate + protein  Lunch: 45-60 grams carbohydrate + protein  Snack: 15-30 grams carbohydrate + protein  Dinner: 45-60 grams carbohydrate + protein  Snack: 15-30 grams carbohydrate + protein    A few tips:   -consume some carbohydrate every 2-3 hours while awake   -you need a minimum of 175 grams of carbohydrate per day   -fruit and cold breakfast cereal are best tolerated at lunch or later   -protein includes: cheese, eggs, fish, nuts, nut butter, chicken, turkey, beef, and pork   -snack ideas: an individual container of Greek yogurt (try Chobani Less Sugar), whole grain crackers and cheese, chocolate fairlife milk, a Kashi or KIND bar, a baseball size piece of whole fruit + nut butter (apple + peanut butter), fruit canned in it's own juice + cottage cheese    4.  Aim for 20-30 minutes of activity most days of the week (with the okay of your OB provider).      5.   Call Diabetes Education at [phone] or send a [employer] message with:   -questions or concerns   -ketones that are small, moderate, or large   -3 or more blood sugars above target in a 7 day period

## 2024-12-02 NOTE — GROUP NOTE
"Diabetes Diabetes Self-Management Education & Support  12/2/2024    Patient Location: [place]  Educator Location: Home - [facility]    Start and End Time  Start Time: 1300  End Time: 1424    SUBJECTIVE / OBJECTIVE    Diabetes management related comments/concerns: (Patient-Rptd) I dont want this to continue after birth. Diabetes is stong in my family and im scared I will not shake ot after this . I was over weight pre pregnancy and have only lost weight for major food aversion. I struggle to eat. Carol lost almost 20 pounds.  Hospital planned for delivery: (Patient-Rptd) [facility]  Number of previous pregnancies: (Patient-Rptd) 0  Had any babies over 9 lbs: (Patient-Rptd) No  Previously had Gestational Diabetes: (Patient-Rptd) No  Have you ever had thyroid problems or taken thyroid medication?: (!) (Patient-Rptd) Yes  Heart disease, mitral valve prolapse or rheumatic fever?: (Patient-Rptd) No  Hypertension : (Patient-Rptd) No  High Cholesterol: (Patient-Rptd) No  High Triglycerides: (Patient-Rptd) No  Do you use tobacco products?: (Patient-Rptd) No  Do you drink beer, wine or hard liquor?: (Patient-Rptd) No    Cultural Influences/Ethnic Background:  Not  or     Estimated Date of Delivery: Feb 26, 2025    1 hour OGTT  Lab Results   Component Value Date    GLU1 168 (H) 11/22/2024       3 hour OGTT    Fasting  No results found for: \"GLF\"    1 hour  No results found for: \"GL1\"    2 hour  No results found for: \"GL2\"    3 hour  No results found for: \"GL3\"    Lifestyle and Health Behaviors:  Pre-pregnancy weight (lbs): (Patient-Rptd) 214  Barrier to exercise: (Patient-Rptd) Physical limitation  Cultural/[employer]ist diet restrictions?: (Patient-Rptd) No  Meal planning/habits: (Patient-Rptd) Smaller portions, Frequent snacking  How many times a week on average do you eat food made away from home (restaurant/take-out)?: (Patient-Rptd) 2  Meals include: (Patient-Rptd) Breakfast, Afternoon Snack  Beverages: " (Patient-Rptd) Water, Sports drinks  How many servings of fruits/vegetables per day: (Patient-Rptd) 1  Biggest challenges to healthy eating: (Patient-Rptd) Finances, Other  Pre-mamie vitamin?: (Patient-Rptd) Yes  Supplements?: (Patient-Rptd) No  Experiencing nausea?: (Patient-Rptd) Yes  Experiencing heartburn?: (Patient-Rptd) Yes    Healthy Coping:  Informal Support system:: (Patient-Rptd) Family, Friends, Parent, Spouse    Educational topics covered today:  GDM diagnosis, pathophysiology, Risks and Complications of GDM, Means of controlling GDM, Using a Blood Glucose Monitor, Blood Glucose Goals, Logging and Interpreting Glucose Results, Ketone Testing, When to Call a Diabetes Educator or OB Provider, Healthy Eating During Pregnancy, Counting Carbohydrates, Meal Planning for GDM, and Physical Activity    Educational materials emailed today:   [facility] Understanding Gestational Diabetes  GDM Glucose Log Sheet  Sharps Disposal  Care After Delivery  GDM Food Log Sheet  HS Snack List    PLAN:  Test glucose 4 times per day:   Fasting (when you first awake for the day): 95 mg/dL or below   1 hour after breakfast: 140 mg/dL or below   1 hour after lunch: 140 mg/dL or below   1 hour after dinner: 140 mg/dL or below     Please bring your meter and log book to all appointments     If you miss 1 hour after meal test, test 2 hours after the meal.  Goal 2 hours after is 120 mg/dL or below.     Short video on use of a meter: https://www.Extenda-Dent.com/watch?v=[email or web address]    2.  Check your urine ketones once a day, when you first awake for the day until they are negative to trace for 7 days in a row.  Then decrease and check once a week.     3.  Meal Plan    Breakfast: 30 grams carbohydrate + protein   Snack: 15-30 grams carbohydrate + protein  Lunch: 45-60 grams carbohydrate + protein  Snack: 15-30 grams carbohydrate + protein  Dinner: 45-60 grams carbohydrate + protein  Snack: 15-30 grams carbohydrate + protein    A few  tips:   -consume some carbohydrate every 2-3 hours while awake   -you need a minimum of 175 grams of carbohydrate per day   -fruit and cold breakfast cereal are best tolerated at lunch or later   -protein includes: cheese, eggs, fish, nuts, nut butter, chicken, turkey, beef, and pork   -snack ideas: an individual container of Greek yogurt (try Chobani Less Sugar), whole grain crackers and cheese, chocolate fairlife milk, a Kashi or KIND bar, a baseball size piece of whole fruit + nut butter (apple + peanut butter), fruit canned in it's own juice + cottage cheese    4.  Aim for 20-30 minutes of activity most days of the week (with the okay of your OB provider).      5.   Call Diabetes Education at [phone] or send a [employer] message with:   -questions or concerns   -ketones that are small, moderate, or large   -3 or more blood sugars above target in a 7 day period    Xiomy Wick, MPH, RD, CDCES, LD 12/2/2024

## 2024-12-06 ENCOUNTER — HOSPITAL ENCOUNTER (OUTPATIENT)
Dept: ULTRASOUND IMAGING | Facility: CLINIC | Age: 44
Discharge: HOME | End: 2024-12-06
Attending: OBSTETRICS & GYNECOLOGY
Payer: COMMERCIAL

## 2024-12-06 DIAGNOSIS — O43.199 MARGINAL INSERTION OF UMBILICAL CORD AFFECTING MANAGEMENT OF MOTHER: ICD-10-CM

## 2024-12-06 DIAGNOSIS — O09.899 TWO VESSEL UMBILICAL CORD, ANTEPARTUM: ICD-10-CM

## 2024-12-06 PROCEDURE — 99213 OFFICE O/P EST LOW 20 MIN: CPT | Mod: 25 | Performed by: OBSTETRICS & GYNECOLOGY

## 2024-12-06 PROCEDURE — 76816 OB US FOLLOW-UP PER FETUS: CPT

## 2024-12-06 PROCEDURE — 76816 OB US FOLLOW-UP PER FETUS: CPT | Mod: 26 | Performed by: OBSTETRICS & GYNECOLOGY

## 2024-12-07 ENCOUNTER — APPOINTMENT (OUTPATIENT)
Dept: ULTRASOUND IMAGING | Facility: CLINIC | Age: 44
End: 2024-12-07
Attending: OBSTETRICS & GYNECOLOGY
Payer: COMMERCIAL

## 2024-12-07 ENCOUNTER — HOSPITAL ENCOUNTER (OUTPATIENT)
Facility: CLINIC | Age: 44
Discharge: HOME | End: 2024-12-07
Attending: OBSTETRICS & GYNECOLOGY | Admitting: OBSTETRICS & GYNECOLOGY
Payer: COMMERCIAL

## 2024-12-07 ENCOUNTER — HOSPITAL ENCOUNTER (EMERGENCY)
Facility: CLINIC | Age: 44
End: 2024-12-07
Payer: COMMERCIAL

## 2024-12-07 VITALS — SYSTOLIC BLOOD PRESSURE: 109 MMHG | RESPIRATION RATE: 16 BRPM | DIASTOLIC BLOOD PRESSURE: 56 MMHG | TEMPERATURE: 98.1 F

## 2024-12-07 DIAGNOSIS — O43.199 MARGINAL INSERTION OF UMBILICAL CORD AFFECTING MANAGEMENT OF MOTHER: ICD-10-CM

## 2024-12-07 DIAGNOSIS — O09.899 TWO VESSEL UMBILICAL CORD, ANTEPARTUM: ICD-10-CM

## 2024-12-07 LAB
ALBUMIN SERPL BCG-MCNC: 3.7 G/DL (ref 3.5–5.2)
ALBUMIN UR-MCNC: 30 MG/DL
ALP SERPL-CCNC: 103 U/L (ref 40–150)
ALT SERPL W P-5'-P-CCNC: 6 U/L (ref 0–50)
ANION GAP SERPL CALCULATED.3IONS-SCNC: 13 MMOL/L (ref 7–15)
APPEARANCE UR: CLEAR
AST SERPL W P-5'-P-CCNC: 10 U/L (ref 0–45)
BACTERIA #/AREA URNS HPF: ABNORMAL /HPF
BILIRUB SERPL-MCNC: 0.2 MG/DL
BILIRUB UR QL STRIP: NEGATIVE
BUN SERPL-MCNC: 9.5 MG/DL (ref 6–20)
CALCIUM SERPL-MCNC: 9.6 MG/DL (ref 8.8–10.4)
CAOX CRY #/AREA URNS HPF: ABNORMAL /HPF
CHLORIDE SERPL-SCNC: 104 MMOL/L (ref 98–107)
COLOR UR AUTO: YELLOW
CREAT SERPL-MCNC: 0.71 MG/DL (ref 0.51–0.95)
EGFRCR SERPLBLD CKD-EPI 2021: >90 ML/MIN/1.73M2
ERYTHROCYTE [DISTWIDTH] IN BLOOD BY AUTOMATED COUNT: 14.2 % (ref 10–15)
FERRITIN SERPL-MCNC: 9 NG/ML (ref 6–175)
GLUCOSE SERPL-MCNC: 97 MG/DL (ref 70–99)
GLUCOSE UR STRIP-MCNC: NEGATIVE MG/DL
HCO3 SERPL-SCNC: 20 MMOL/L (ref 22–29)
HCT VFR BLD AUTO: 30 % (ref 35–47)
HGB BLD-MCNC: 9.6 G/DL (ref 11.7–15.7)
HGB UR QL STRIP: ABNORMAL
HYALINE CASTS: 1 /LPF
KETONES UR STRIP-MCNC: 40 MG/DL
LEUKOCYTE ESTERASE UR QL STRIP: ABNORMAL
MCH RBC QN AUTO: 28.2 PG (ref 26.5–33)
MCHC RBC AUTO-ENTMCNC: 32 G/DL (ref 31.5–36.5)
MCV RBC AUTO: 88 FL (ref 78–100)
MUCOUS THREADS #/AREA URNS LPF: PRESENT /LPF
NITRATE UR QL: NEGATIVE
PH UR STRIP: 6 [PH] (ref 5–7)
PLATELET # BLD AUTO: 413 10E3/UL (ref 150–450)
POTASSIUM SERPL-SCNC: 4.3 MMOL/L (ref 3.4–5.3)
PROT SERPL-MCNC: 7.2 G/DL (ref 6.4–8.3)
RBC # BLD AUTO: 3.41 10E6/UL (ref 3.8–5.2)
RBC URINE: 8 /HPF
SODIUM SERPL-SCNC: 137 MMOL/L (ref 135–145)
SP GR UR STRIP: 1.03 (ref 1–1.03)
SQUAMOUS EPITHELIAL: 9 /HPF
UROBILINOGEN UR STRIP-MCNC: 2 MG/DL
WBC # BLD AUTO: 12.9 10E3/UL (ref 4–11)
WBC URINE: 12 /HPF

## 2024-12-07 PROCEDURE — 82040 ASSAY OF SERUM ALBUMIN: CPT | Performed by: OBSTETRICS & GYNECOLOGY

## 2024-12-07 PROCEDURE — 36415 COLL VENOUS BLD VENIPUNCTURE: CPT | Performed by: OBSTETRICS & GYNECOLOGY

## 2024-12-07 PROCEDURE — 87086 URINE CULTURE/COLONY COUNT: CPT | Performed by: OBSTETRICS & GYNECOLOGY

## 2024-12-07 PROCEDURE — 82728 ASSAY OF FERRITIN: CPT | Performed by: OBSTETRICS & GYNECOLOGY

## 2024-12-07 PROCEDURE — 81001 URINALYSIS AUTO W/SCOPE: CPT | Performed by: OBSTETRICS & GYNECOLOGY

## 2024-12-07 PROCEDURE — 85018 HEMOGLOBIN: CPT | Performed by: OBSTETRICS & GYNECOLOGY

## 2024-12-07 PROCEDURE — 80053 COMPREHEN METABOLIC PANEL: CPT | Performed by: OBSTETRICS & GYNECOLOGY

## 2024-12-07 PROCEDURE — 250N000013 HC RX MED GY IP 250 OP 250 PS 637: Performed by: OBSTETRICS & GYNECOLOGY

## 2024-12-07 PROCEDURE — G0463 HOSPITAL OUTPT CLINIC VISIT: HCPCS | Mod: 25

## 2024-12-07 PROCEDURE — 76770 US EXAM ABDO BACK WALL COMP: CPT

## 2024-12-07 PROCEDURE — 59025 FETAL NON-STRESS TEST: CPT

## 2024-12-07 RX ORDER — ACETAMINOPHEN 325 MG/1
TABLET ORAL
Status: COMPLETED
Start: 2024-12-07 | End: 2024-12-07

## 2024-12-07 RX ORDER — ACETAMINOPHEN 500 MG
500-1000 TABLET ORAL ONCE
Status: COMPLETED | OUTPATIENT
Start: 2024-12-07 | End: 2024-12-07

## 2024-12-07 RX ADMIN — Medication 975 MG: at 20:11

## 2024-12-07 RX ADMIN — ACETAMINOPHEN 975 MG: 325 TABLET, FILM COATED ORAL at 20:11

## 2024-12-07 ASSESSMENT — ACTIVITIES OF DAILY LIVING (ADL)
ADLS_ACUITY_SCORE: 41

## 2024-12-08 NOTE — PROVIDER NOTIFICATION
12/07/24 9769   Provider Notification   Provider Name/Title Dr Mcgovern   Method of Notification Phone   Request Evaluate - Remote   Notification Reason Lab/Diagnostic Study;Pain     Orders received for LR bolus, Renal US, and tylenol

## 2024-12-08 NOTE — PROGRESS NOTES
Tylenol given for pain, offered to request something strong from MD. Patient states narcotics either cause her to be violently ill or do nothing for her pain, regardless of administration route. Pt declines stronger pain medication.

## 2024-12-08 NOTE — DISCHARGE INSTRUCTIONS
Learning About Hydronephrosis  What is hydronephrosis?     Hydronephrosis is swelling of the kidneys. It is caused by a buildup of urine. This condition can happen if a tube that drains urine from your kidneys is blocked. The blockage can come from within the urinary tract or from pressure outside of the tract. Pregnancy is an example of an outside (external) cause.  This condition is often caused by a blockage such as a kidney stone, tumor, or blood clot. It also can be caused by a problem in your urinary system that you were born with (congenital problem).  What are the symptoms?  Some of the common symptoms are:  Pain in one or both sides.  Stomach pain.  Blood in your urine.  Some people have no symptoms.  How is it diagnosed?  Your doctor will do an ultrasound to look for a blockage in your urinary system. An ultrasound allows your doctor to see a picture of the organs and other structures in your belly (abdomen). You also may need blood and urine tests.  How is it treated?  Your treatment depends on the cause of the swelling. If it is caused by a blockage, your treatment will depend on the type of blockage you have. If the blockage is caused by a kidney stone, you may wait for the stone to pass. If hydronephrosis happens during pregnancy, it usually clears up on its own.  You may need to have urine drained from your bladder or kidneys. A urinary catheter is a small, flexible tube that can be inserted through the urethra and into the bladder, allowing urine to drain. A nephrostomy catheter is a thin tube placed into your kidney to drain urine. Sometimes surgery is needed to clear the blockage.  If you have a blockage, you should begin to feel better after the blockage is gone.  Many people recover and have no long-term problems. But some may have kidney damage. If hydronephrosis was left untreated for a long time, the damage can be severe. Severe damage will require further treatment.  Follow-up care is a key  part of your treatment and safety. Be sure to make and go to all appointments, and call your doctor if you are having problems. It's also a good idea to know your test results and keep a list of the medicines you take.  Current as of: November 15, 2023  Content Version: 14.2    2024 [employer].   Care instructions adapted under license by your healthcare professional. If you have questions about a medical condition or this instruction, always ask your healthcare professional. Healthwise, Incorporated disclaims any warranty or liability for your use of this information.  Kidney Stone: Care Instructions  Your Care Instructions     Kidney stones are formed when salts, minerals, and other substances normally found in the urine clump together. They can be as small as grains of sand or, rarely, as large as golf balls.  While the stone is traveling through the ureter, which is the tube that carries urine from the kidney to the bladder, you will probably feel pain. The pain may be mild or very severe. You may also have some blood in your urine. As soon as the stone reaches the bladder, any intense pain should go away.  If a stone is too large to pass on its own, you may need a medical procedure to help you pass the stone.  The doctor has checked you carefully, but problems can develop later. If you notice any problems or new symptoms, get medical treatment right away.  Follow-up care is a key part of your treatment and safety. Be sure to make and go to all appointments, and call your doctor if you are having problems. It's also a good idea to know your test results and keep a list of the medicines you take.  How can you care for yourself at home?  Drink plenty of fluids. If you have kidney, heart, or liver disease and have to limit fluids, talk with your doctor before you increase the amount of fluids you drink.  Take pain medicines exactly as directed. Call your doctor if you think you are having a problem with  "your medicine.  If the doctor gave you a prescription medicine for pain, take it as prescribed.  If you are not taking a prescription pain medicine, ask your doctor if you can take an over-the-counter medicine. Read and follow all instructions on the label.  Your doctor may ask you to strain your urine so that you can collect your kidney stone when it passes. You can use a kitchen strainer or a tea strainer to catch the stone. Store it in a plastic bag until you see your doctor again.  Preventing future kidney stones  Some changes in your diet may help prevent kidney stones. Depending on the cause of your stones, your doctor may recommend that you:  Drink plenty of fluids. If you have kidney, heart, or liver disease and have to limit fluids, talk with your doctor before you increase the amount of fluids you drink.  Limit coffee, tea, and alcohol. Also avoid grapefruit juice.  Do not take more than the recommended daily dose of vitamins C and D.  Avoid antacids such as Maalox, Mylanta, or Tums.  Limit the amount of salt (sodium) in your diet.  Eat a balanced diet that is not too high in protein.  Limit foods that are high in a substance called oxalate, which can cause kidney stones. These foods include dark green vegetables, rhubarb, chocolate, wheat bran, nuts, cranberries, and beans.  When should you call for help?   Call your doctor now or seek immediate medical care if:    You cannot keep down fluids.     Your pain gets worse.     You have a fever or chills.     You have new or worse pain in your back just below your rib cage (the flank area).     You have new or more blood in your urine.   Watch closely for changes in your health, and be sure to contact your doctor if:    You do not get better as expected.   Where can you learn more?  Go to https://www.healthwise.net/patiented  Enter X633 in the search box to learn more about \"Kidney Stone: Care Instructions.\"  Current as of: November 15, 2023  Content Version: " 14.2    2024 [facility] [employer].   Care instructions adapted under license by your healthcare professional. If you have questions about a medical condition or this instruction, always ask your healthcare professional. Healthwise, Incorporated disclaims any warranty or liability for your use of this information.  Learning About When to Call Your Doctor During Pregnancy (After 20 Weeks)  Overview  It's common to have concerns about what might be a problem when you're pregnant. Most pregnancies don't have any serious problems. But it's still important to know when to call your doctor if you have certain symptoms or signs of labor.  These are general suggestions. Your doctor may give you some more information about when to call.  When to call your doctor (after 20 weeks)  Call 911  anytime you think you may need emergency care. For example, call if:  You have severe vaginal bleeding. This means you are soaking through a pad each hour for 2 or more hours.  You have sudden, severe pain in your belly.  You have chest pain, are short of breath, or cough up blood.  You passed out (lost consciousness).  You have a seizure.  You see or feel the umbilical cord.  You think you are about to deliver your baby and can't make it safely to the hospital or birthing center.  Call your doctor now or seek immediate medical care if:  You have vaginal bleeding.  You have belly pain.  You have a fever.  You are dizzy or lightheaded, or you feel like you may faint.  You have signs of a blood clot in your leg (called a deep vein thrombosis), such as:  Pain in the calf, back of the knee, thigh, or groin.  Swelling in your leg or groin.  A color change on the leg or groin. The skin may be reddish or purplish, depending on your usual skin color.  You have symptoms of preeclampsia, such as:  Sudden swelling of your face, hands, or feet.  New vision problems (such as dimness, blurring, or seeing spots).  A severe headache.  You have a sudden  "release of fluid from your vagina. (You think your water broke.)  You've been having regular contractions for an hour. This means that you've had at least 6 contractions within 1 hour, even after you change your position and drink fluids.  You notice that your baby has stopped moving or is moving less than normal.  You have signs of heart failure, such as:  New or increased shortness of breath.  New or worse swelling in your legs, ankles, or feet.  Sudden weight gain, such as more than 2 to 3 pounds in a day or 5 pounds in a week.  Feeling so tired or weak that you cannot do your usual activities.  You have symptoms of a urinary tract infection. These may include:  Pain or burning when you urinate.  A frequent need to urinate without being able to pass much urine.  Pain in the flank, which is just below the rib cage and above the waist on either side of the back.  Blood in your urine.  Watch closely for changes in your health, and be sure to contact your doctor if:  You have vaginal discharge that smells bad.  You feel sad, anxious, or hopeless for more than a few days.  You have skin changes, such as a rash, itching, or a yellow color to your skin.  You have other concerns about your pregnancy.  If you have labor signs at 37 weeks or more  If you have signs of labor at 37 weeks or more, your doctor may tell you to call when your labor becomes more active. Symptoms of active labor include:  Contractions that are regular.  Contractions that are less than 5 minutes apart.  Contractions that are hard to talk through.  Follow-up care is a key part of your treatment and safety. Be sure to make and go to all appointments, and call your doctor if you are having problems. It's also a good idea to know your test results and keep a list of the medicines you take.  Where can you learn more?  Go to https://www.healthwise.net/patiented  Enter N531 in the search box to learn more about \"Learning About When to Call Your Doctor " "During Pregnancy (After 20 Weeks).\"  Current as of: July 10, 2023  Content Version: 14.2 2024 [facility] [employer].   Care instructions adapted under license by your healthcare professional. If you have questions about a medical condition or this instruction, always ask your healthcare professional. Healthwise, Incorporated disclaims any warranty or liability for your use of this information.    "

## 2024-12-08 NOTE — PROGRESS NOTES
Pt in sent to [facility] from ED with left flank pain at 28.3 weeks gestation. Pt states it feels like kidney stones which she has had twice in the past. Pt currently rates her pain 2/10. She said it was 6/10 when she left work to come here. Pt reports good fetal movement. Denies, cramping, LOF, and vaginal bleeding.   Urine sent for UA reflex to culture and labs drawn.

## 2024-12-08 NOTE — PROVIDER NOTIFICATION
12/07/24 1748   Provider Notification   Provider Name/Title Dr Mcgovern   Method of Notification Phone   Request Evaluate - Remote   Notification Reason Status Update     Pt wanting to eat. Rates flank pain now a 2. Pain was 6 when she decided to come in.   Provider does not want pt to eat at this time.  Order received to take pt off the monitor.

## 2024-12-08 NOTE — PROGRESS NOTES
Discharge instructions given, patient verbalizes understanding. Instructed patient to drink plenty of fluids to help flush any potential kidney stone; pt seemed down/unhappy with the recommendation. Encouraged patient to continue drinking plenty of water even after kidney issue resolves. Same response.    Pt states she is unsure if she can drive home with her pain, but verbally confirms with me she has somebody she can call for a ride.    Pt discharged to home in stable condition. Taken per wheelchair to door 6, awaits ride.

## 2024-12-09 ENCOUNTER — MYC MEDICAL ADVICE (OUTPATIENT)
Dept: EDUCATION SERVICES | Facility: CLINIC | Age: 44
End: 2024-12-09

## 2024-12-09 ENCOUNTER — TELEPHONE (OUTPATIENT)
Dept: OBGYN | Facility: CLINIC | Age: 44
End: 2024-12-09

## 2024-12-09 ENCOUNTER — VIRTUAL VISIT (OUTPATIENT)
Dept: EDUCATION SERVICES | Facility: CLINIC | Age: 44
End: 2024-12-09
Payer: COMMERCIAL

## 2024-12-09 DIAGNOSIS — O24.410 DIET CONTROLLED GESTATIONAL DIABETES MELLITUS (GDM) IN SECOND TRIMESTER: Primary | ICD-10-CM

## 2024-12-09 LAB — BACTERIA UR CULT: NORMAL

## 2024-12-09 PROCEDURE — G0108 DIAB MANAGE TRN  PER INDIV: HCPCS | Mod: 95

## 2024-12-09 NOTE — TELEPHONE ENCOUNTER
28w5d      Seen in ED on  at [facility] for possible kidney stone.    Had renal US     IMPRESSION:  Mild to moderate left hydronephrosis, which could be related to distal stone or potentially compression by gravid uterus.     Pt advised to increase oral hydration, use tylenol as needed.  Pt declined any Rx for narcotics at ED.    Still having persistent moderate pain.     Pt advised if severe pain, fever or unable to void needs to seek treatment in ED. Pt will notify clinic if contractions, leaking of fluid, vaginal bleeding or decrease in fetal movement.     Appt made for follow up with Dr Yee on 12/10.    Natasha MAI RN  OB/[facility] [place]

## 2024-12-09 NOTE — PATIENT INSTRUCTIONS
Continue to test glucose 4 times per day:   Fasting (when you first awake for the day): 95 mg/dL or below   1 hour after breakfast: 140 mg/dL or below   1 hour after lunch: 140 mg/dL or below   1 hour after dinner: 140 mg/dL or below     Please bring your meter and log book to all appointments     If you miss a 1 hour after a meal test, test 2 hours after the meal.  Goal 2 hours after is 120 mg/dL or below.      To decrease your out of pocket cost, consider obtaining a Glucocard/Arkray meter at no cost from a [place] pharmacy.  Test strips should be about $9-13 for 50.    2.  Check your urine ketones once a day when you first awake for the day.  Goal is negative to trace.    3.  Meal Plan    Breakfast: 30 grams carbohydrate + protein   Snack: 30 grams carbohydrate + protein  Lunch: 30 grams carbohydrate + protein  Snack: 30 grams carbohydrate + protein  Dinner: 30 grams carbohydrate + protein  Snack: 30 grams carbohydrate + protein    Remember you should consume some carbohydrates every 2-3 hours while awake and you need a minimum of 175 grams of carbohydrate per day.    4.  Aim for 20-30 minutes of activity most days of the week (with the okay of your OB provider).      5.  After delivery, check your glucose starting in the second week postpartum.  Test 4 times per week.  Twice fasting and twice 2 hours after a meal.    Fasting goal is 100 mg/dL or below   2 hours after a meal goal is 140 mg/dL or below     Please note these are different goals then when pregnant.   Bring these to your postpartum OB visit.    6.  Be screened for type 2 diabetes at 4-12 weeks postpartum and every 1-3 years there after.     7.  If you are planning future pregnancies, confirm normal glucoses before conceiving.     8.  Send a [employer] message on Thursday, December 12th with an update on glucoses and urine ketones    9.  Call Diabetes Education at [phone] or send a [employer] message with:   -questions or concerns   -ketones that are  small, moderate, or large   -3 or more blood sugars above target in a 7 day period    Thank you,     Xiomy Wick, MPH, RD, CDCES, LD 12/9/2024

## 2024-12-09 NOTE — LETTER
12/9/2024         RE: Anahi Hannah  [address] [facility] [address]        Dear Colleague,    Thank you for referring your patient, Anahi Hannah, to the [facility]. Please see a copy of my visit note below.    Diabetes Self-Management Education & Support  Type of service:  Video Visit    If the video visit is dropped, the video visit invitation should be resent by: Text to cell phone: [phone]    Originating Location (pt. Location): Home  Distant Location (provider location): [place] [facility]  Mode of Communication:  Video Conference via Alkymos    Video Start Time:  10:26 AM  Video End Time (time video stopped): 11:16 AM    How would patient like to obtain AVS? MyChart    SUBJECTIVE/OBJECTIVE:  Presents for education related to gestational diabetes.    Gestational weeks: 28w5d  Hospital planned for delivery: [facility] OB Visit Date: 12/10/24  Number of previous pregnancies: 0  Had any babies over 9 lbs: No  Previously had Gestational Diabetes: No  Have you ever had thyroid problems or taken thyroid medication?: (!) Yes  Heart disease, mitral valve prolapse or rheumatic fever?: No  Hypertension : No  High Cholesterol: No  High Triglycerides: No  Do you use tobacco products?: No  Do you drink beer, wine or hard liquor?: No    Cultural Influences/Ethnic Background:  Not  or     LMP  (LMP Unknown)       Estimated Date of Delivery: Feb 26, 2025    Blood Glucose/Ketone Log:   Date Ketones Fasting Post Breakfast Post Lunch Post Supper   12/2     130   12/3 15 98 133* 126 121   12/4 15 95 161 (1/2 English muffin with nut butter) 131 118   12/5 15 94 140 128 130   12/6 5 85 164 163 131   12/7 5 95 131 132 108   12/8 40 89 109 161 110   12/9 15-40 86 134     (Testing 1 hour post meal unless * = 2 hours post meal)    Lifestyle and Health Behaviors:  Pre-pregnancy weight (lbs): 214  Exercise:: Currently not exercising (feels weak in AM,  was walking up to week 22)  Barrier to exercise: Physical limitation  Cultural/Methodist diet restrictions?: No  How many times a week on average do you eat food made away from home (restaurant/take-out)?: 2  Breakfast: today = string cheese with 2 vegan snacking rounds (Ozery brand), Gatorade zero sugar  Lunch: yesterday = 1/2 Gage Lester breakfast sandwich, water  Dinner: last night (6 PM) = tuna salad on christopher, 3 small pretzels, water  Snacks: AM yesterday = none; PM snack yesterday: Fairlife milk (mix of chocolate and regular); HS snack yesterday = pretzels possibly  Other:  usually cooks; to bed 10-11 PM; food aversions to most foods = choosing more dry/bland  Beverages: Water, Sports drinks (zero sugar sports drinks)  Pre- vitamin?: Yes  Supplements?: Yes  List supplements currently taking: B6  Experiencing nausea?: Yes  Experiencing heartburn?: Yes    Healthy Coping:  Emotional response to diabetes: Ready to learn  Informal Support system: Family, Friends, Parent, Spouse  Stage of change: ACTION (Actively working towards change)    Current Management:  Taking medications for gestational diabetes?: No  Difficulty affording diabetes testing supplies?:  (paid $56 for 150 test strips)    ASSESSMENT:  Ketones: trace x2, small x3, small-moderate x1, moderate x1.   Fasting blood glucoses: 86% in target.  After breakfast: 57% in target.  After lunch: 67% in target.  After dinner: 100% in target.    Patient reports she is struggling with nausea and food aversions.  Encouraged patient to aim for 30 grams of carbohydrate each of 6 times per day.  Encouraged protein at each of these times.  Provided suggestions.  Discussed importance of eating every 2-3 hours while awake especially in the evening.   Discussed labor, delivery, and postpartum.     INTERVENTION:  Educational topics covered today:  Food/Meal Planning, What to expect after delivery, Future testing for Type 2 diabetes (2 hour OGTT at 6 week  post-partum check-up and annual fasting blood glucose level), Risk of GDM and planning ahead for future pregnancies, Recommended lifestyle interventions for reducing the risk of Type 2 Diabetes, When to Call a Diabetes Educator or OB Provider    Educational Materials provided today:  No new materials provided today.    PLAN:  Continue to test glucose 4 times per day:   Fasting (when you first awake for the day): 95 mg/dL or below   1 hour after breakfast: 140 mg/dL or below   1 hour after lunch: 140 mg/dL or below   1 hour after dinner: 140 mg/dL or below     Please bring your meter and log book to all appointments     If you miss a 1 hour after a meal test, test 2 hours after the meal.  Goal 2 hours after is 120 mg/dL or below.     To decrease your out of pocket cost, consider obtaining a Glucocard/Arkray meter at no cost from a [facility] pharmacy.  Test strips should be about $9-13 for 50.    2.  Check your urine ketones once a day when you first awake for the day.  Goal is negative to trace.    3.  Meal Plan    Breakfast: 30 grams carbohydrate + protein   Snack: 30 grams carbohydrate + protein  Lunch: 30 grams carbohydrate + protein  Snack: 30 grams carbohydrate + protein  Dinner: 30 grams carbohydrate + protein  Snack: 30 grams carbohydrate + protein    Remember you should consume some carbohydrates every 2-3 hours while awake and you need a minimum of 175 grams of carbohydrate per day.    4.  Aim for 20-30 minutes of activity most days of the week (with the okay of your OB provider).      5.  After delivery, check your glucose starting in the second week postpartum.  Test 4 times per week.  Twice fasting and twice 2 hours after a meal.    Fasting goal is 100 mg/dL or below   2 hours after a meal goal is 140 mg/dL or below     Please note these are different goals then when pregnant.   Bring these to your postpartum OB visit.    6.  Be screened for type 2 diabetes at 4-12 weeks postpartum and every 1-3 years  there after.     7.  If you are planning future pregnancies, confirm normal glucoses before conceiving.     8.  Send a Fiksu message on Thursday, December 12th with an update on glucoses and urine ketones    9.  Call Diabetes Education at [phone] or send a Fiksu message with:   -questions or concerns   -ketones that are small, moderate, or large   -3 or more blood sugars above target in a 7 day period    Xiomy iWck, MPH, RD, CDCES, LD 12/9/2024    Time Spent: 50 minutes  Encounter Type: Individual    Any diabetes medication dose changes were made via the CDE Protocol and Collaborative Practice Agreement with the patient's referring provider. A copy of this encounter was shared with the provider.

## 2024-12-09 NOTE — PROGRESS NOTES
Diabetes Self-Management Education & Support  Type of service:  Video Visit    If the video visit is dropped, the video visit invitation should be resent by: Text to cell phone: [phone]    Originating Location (pt. Location): Home  Distant Location (provider location): [facility]  Mode of Communication:  Video Conference via Bustle    Video Start Time:  10:26 AM  Video End Time (time video stopped): 11:16 AM    How would patient like to obtain AVS? MyChart    SUBJECTIVE/OBJECTIVE:  Presents for education related to gestational diabetes.    Gestational weeks: 28w5d  Hospital planned for delivery: [facility] OB Visit Date: 12/10/24  Number of previous pregnancies: 0  Had any babies over 9 lbs: No  Previously had Gestational Diabetes: No  Have you ever had thyroid problems or taken thyroid medication?: (!) Yes  Heart disease, mitral valve prolapse or rheumatic fever?: No  Hypertension : No  High Cholesterol: No  High Triglycerides: No  Do you use tobacco products?: No  Do you drink beer, wine or hard liquor?: No    Cultural Influences/Ethnic Background:  Not  or     LMP  (LMP Unknown)       Estimated Date of Delivery: Feb 26, 2025    Blood Glucose/Ketone Log:   Date Ketones Fasting Post Breakfast Post Lunch Post Supper   12/2     130   12/3 15 98 133* 126 121   12/4 15 95 161 (1/2 English muffin with nut butter) 131 118   12/5 15 94 140 128 130   12/6 5 85 164 163 131   12/7 5 95 131 132 108   12/8 40 89 109 161 110   12/9 15-40 86 134     (Testing 1 hour post meal unless * = 2 hours post meal)    Lifestyle and Health Behaviors:  Pre-pregnancy weight (lbs): 214  Exercise:: Currently not exercising (feels weak in AM, was walking up to week 22)  Barrier to exercise: Physical limitation  Cultural/Scientologist diet restrictions?: No  How many times a week on average do you eat food made away from home (restaurant/take-out)?: 2  Breakfast: today = string cheese with 2 vegan snacking  angel (Ozery brand), Kristinede zero sugar  Lunch: yesterday = 1/2 Gage Lester breakfast sandwich, water  Dinner: last night (6 PM) = tuna salad on christopher, 3 small pretzels, water  Snacks: AM yesterday = none; PM snack yesterday: Fairlife milk (mix of chocolate and regular); HS snack yesterday = pretzels possibly  Other:  usually cooks; to bed 10-11 PM; food aversions to most foods = choosing more dry/bland  Beverages: Water, Sports drinks (zero sugar sports drinks)  Pre- vitamin?: Yes  Supplements?: Yes  List supplements currently taking: B6  Experiencing nausea?: Yes  Experiencing heartburn?: Yes    Healthy Coping:  Emotional response to diabetes: Ready to learn  Informal Support system: Family, Friends, Parent, Spouse  Stage of change: ACTION (Actively working towards change)    Current Management:  Taking medications for gestational diabetes?: No  Difficulty affording diabetes testing supplies?:  (paid $56 for 150 test strips)    ASSESSMENT:  Ketones: trace x2, small x3, small-moderate x1, moderate x1.   Fasting blood glucoses: 86% in target.  After breakfast: 57% in target.  After lunch: 67% in target.  After dinner: 100% in target.    Patient reports she is struggling with nausea and food aversions.  Encouraged patient to aim for 30 grams of carbohydrate each of 6 times per day.  Encouraged protein at each of these times.  Provided suggestions.  Discussed importance of eating every 2-3 hours while awake especially in the evening.   Discussed labor, delivery, and postpartum.     INTERVENTION:  Educational topics covered today:  Food/Meal Planning, What to expect after delivery, Future testing for Type 2 diabetes (2 hour OGTT at 6 week post-partum check-up and annual fasting blood glucose level), Risk of GDM and planning ahead for future pregnancies, Recommended lifestyle interventions for reducing the risk of Type 2 Diabetes, When to Call a Diabetes Educator or OB Provider    Educational Materials  provided today:  No new materials provided today.    PLAN:  Continue to test glucose 4 times per day:   Fasting (when you first awake for the day): 95 mg/dL or below   1 hour after breakfast: 140 mg/dL or below   1 hour after lunch: 140 mg/dL or below   1 hour after dinner: 140 mg/dL or below     Please bring your meter and log book to all appointments     If you miss a 1 hour after a meal test, test 2 hours after the meal.  Goal 2 hours after is 120 mg/dL or below.     To decrease your out of pocket cost, consider obtaining a Glucocard/Arkray meter at no cost from a [facility] pharmacy.  Test strips should be about $9-13 for 50.    2.  Check your urine ketones once a day when you first awake for the day.  Goal is negative to trace.    3.  Meal Plan    Breakfast: 30 grams carbohydrate + protein   Snack: 30 grams carbohydrate + protein  Lunch: 30 grams carbohydrate + protein  Snack: 30 grams carbohydrate + protein  Dinner: 30 grams carbohydrate + protein  Snack: 30 grams carbohydrate + protein    Remember you should consume some carbohydrates every 2-3 hours while awake and you need a minimum of 175 grams of carbohydrate per day.    4.  Aim for 20-30 minutes of activity most days of the week (with the okay of your OB provider).      5.  After delivery, check your glucose starting in the second week postpartum.  Test 4 times per week.  Twice fasting and twice 2 hours after a meal.    Fasting goal is 100 mg/dL or below   2 hours after a meal goal is 140 mg/dL or below     Please note these are different goals then when pregnant.   Bring these to your postpartum OB visit.    6.  Be screened for type 2 diabetes at 4-12 weeks postpartum and every 1-3 years there after.     7.  If you are planning future pregnancies, confirm normal glucoses before conceiving.     8.  Send a [employer] message on Thursday, December 12th with an update on glucoses and urine ketones    9.  Call Diabetes Education at [phone] or send a [employer]  message with:   -questions or concerns   -ketones that are small, moderate, or large   -3 or more blood sugars above target in a 7 day period    Xiomy Wick, MPH, RD, CDCES, LD 12/9/2024    Time Spent: 50 minutes  Encounter Type: Individual    Any diabetes medication dose changes were made via the CDE Protocol and Collaborative Practice Agreement with the patient's referring provider. A copy of this encounter was shared with the provider.

## 2024-12-10 ENCOUNTER — HOSPITAL ENCOUNTER (INPATIENT)
Facility: CLINIC | Age: 44
LOS: 2 days | Discharge: HOME | DRG: 832 | End: 2024-12-12
Attending: OBSTETRICS & GYNECOLOGY | Admitting: OBSTETRICS & GYNECOLOGY
Payer: COMMERCIAL

## 2024-12-10 ENCOUNTER — PRENATAL OFFICE VISIT (OUTPATIENT)
Dept: OBGYN | Facility: CLINIC | Age: 44
End: 2024-12-10
Payer: COMMERCIAL

## 2024-12-10 VITALS — WEIGHT: 199.9 LBS | SYSTOLIC BLOOD PRESSURE: 128 MMHG | BODY MASS INDEX: 29.52 KG/M2 | DIASTOLIC BLOOD PRESSURE: 76 MMHG

## 2024-12-10 DIAGNOSIS — O09.899 TWO VESSEL UMBILICAL CORD, ANTEPARTUM: ICD-10-CM

## 2024-12-10 DIAGNOSIS — O22.30 DVT (DEEP VEIN THROMBOSIS) IN PREGNANCY: ICD-10-CM

## 2024-12-10 DIAGNOSIS — O09.813 PREGNANCY RESULTING FROM IN VITRO FERTILIZATION IN THIRD TRIMESTER: ICD-10-CM

## 2024-12-10 DIAGNOSIS — O99.283 HYPOTHYROIDISM AFFECTING PREGNANCY IN THIRD TRIMESTER: ICD-10-CM

## 2024-12-10 DIAGNOSIS — O43.199 MARGINAL INSERTION OF UMBILICAL CORD AFFECTING MANAGEMENT OF MOTHER: ICD-10-CM

## 2024-12-10 DIAGNOSIS — O34.29 PREGNANCY WITH HISTORY OF UTERINE MYOMECTOMY: ICD-10-CM

## 2024-12-10 DIAGNOSIS — O09.513 PRIMIGRAVIDA OF ADVANCED MATERNAL AGE IN THIRD TRIMESTER: ICD-10-CM

## 2024-12-10 DIAGNOSIS — E03.9 HYPOTHYROIDISM AFFECTING PREGNANCY IN THIRD TRIMESTER: ICD-10-CM

## 2024-12-10 DIAGNOSIS — N13.39 OTHER HYDRONEPHROSIS: Primary | ICD-10-CM

## 2024-12-10 DIAGNOSIS — O99.019 ANEMIA AFFECTING FIRST PREGNANCY: ICD-10-CM

## 2024-12-10 DIAGNOSIS — O24.410 WHITE CLASSIFICATION A1 GESTATIONAL DIABETES MELLITUS (GDM), DIET CONTROLLED: ICD-10-CM

## 2024-12-10 PROBLEM — N13.30 HYDRONEPHROSIS: Status: ACTIVE | Noted: 2024-12-10

## 2024-12-10 LAB
ABO + RH BLD: NORMAL
ALBUMIN UR-MCNC: 10 MG/DL
APPEARANCE UR: CLEAR
BACTERIA #/AREA URNS HPF: ABNORMAL /HPF
BASOPHILS # BLD AUTO: 0 10E3/UL (ref 0–0.2)
BASOPHILS NFR BLD AUTO: 0 %
BILIRUB UR QL STRIP: NEGATIVE
BLD GP AB SCN SERPL QL: NEGATIVE
COLOR UR AUTO: ABNORMAL
EOSINOPHIL # BLD AUTO: 0.1 10E3/UL (ref 0–0.7)
EOSINOPHIL NFR BLD AUTO: 0 %
ERYTHROCYTE [DISTWIDTH] IN BLOOD BY AUTOMATED COUNT: 14.3 % (ref 10–15)
GLUCOSE UR STRIP-MCNC: NEGATIVE MG/DL
HCT VFR BLD AUTO: 29.3 % (ref 35–47)
HGB BLD-MCNC: 9.5 G/DL (ref 11.7–15.7)
HGB UR QL STRIP: NEGATIVE
HOLD SPECIMEN: NORMAL
IMM GRANULOCYTES # BLD: 0.1 10E3/UL
IMM GRANULOCYTES NFR BLD: 1 %
KETONES UR STRIP-MCNC: 40 MG/DL
LEUKOCYTE ESTERASE UR QL STRIP: NEGATIVE
LYMPHOCYTES # BLD AUTO: 2.3 10E3/UL (ref 0.8–5.3)
LYMPHOCYTES NFR BLD AUTO: 17 %
MCH RBC QN AUTO: 28.1 PG (ref 26.5–33)
MCHC RBC AUTO-ENTMCNC: 32.4 G/DL (ref 31.5–36.5)
MCV RBC AUTO: 87 FL (ref 78–100)
MONOCYTES # BLD AUTO: 0.6 10E3/UL (ref 0–1.3)
MONOCYTES NFR BLD AUTO: 5 %
MUCOUS THREADS #/AREA URNS LPF: PRESENT /LPF
NEUTROPHILS # BLD AUTO: 10.5 10E3/UL (ref 1.6–8.3)
NEUTROPHILS NFR BLD AUTO: 78 %
NITRATE UR QL: NEGATIVE
NRBC # BLD AUTO: 0 10E3/UL
NRBC BLD AUTO-RTO: 0 /100
PH UR STRIP: 6 [PH] (ref 5–7)
PLATELET # BLD AUTO: 431 10E3/UL (ref 150–450)
RBC # BLD AUTO: 3.38 10E6/UL (ref 3.8–5.2)
RBC URINE: 1 /HPF
SP GR UR STRIP: 1.02 (ref 1–1.03)
SPECIMEN EXP DATE BLD: NORMAL
SQUAMOUS EPITHELIAL: 9 /HPF
UROBILINOGEN UR STRIP-MCNC: NORMAL MG/DL
WBC # BLD AUTO: 13.5 10E3/UL (ref 4–11)
WBC URINE: 6 /HPF

## 2024-12-10 PROCEDURE — 250N000013 HC RX MED GY IP 250 OP 250 PS 637: Performed by: OBSTETRICS & GYNECOLOGY

## 2024-12-10 PROCEDURE — 86901 BLOOD TYPING SEROLOGIC RH(D): CPT | Performed by: OBSTETRICS & GYNECOLOGY

## 2024-12-10 PROCEDURE — 96365 THER/PROPH/DIAG IV INF INIT: CPT

## 2024-12-10 PROCEDURE — 120N000013 HC R&B IMCU

## 2024-12-10 PROCEDURE — 81001 URINALYSIS AUTO W/SCOPE: CPT | Performed by: OBSTETRICS & GYNECOLOGY

## 2024-12-10 PROCEDURE — 85041 AUTOMATED RBC COUNT: CPT | Performed by: OBSTETRICS & GYNECOLOGY

## 2024-12-10 PROCEDURE — 96366 THER/PROPH/DIAG IV INF ADDON: CPT

## 2024-12-10 PROCEDURE — 85004 AUTOMATED DIFF WBC COUNT: CPT | Performed by: OBSTETRICS & GYNECOLOGY

## 2024-12-10 PROCEDURE — 258N000003 HC RX IP 258 OP 636: Performed by: OBSTETRICS & GYNECOLOGY

## 2024-12-10 PROCEDURE — 99207 PR COMPLICATED OB VISIT: CPT | Performed by: OBSTETRICS & GYNECOLOGY

## 2024-12-10 PROCEDURE — 250N000011 HC RX IP 250 OP 636: Performed by: OBSTETRICS & GYNECOLOGY

## 2024-12-10 PROCEDURE — 99221 1ST HOSP IP/OBS SF/LOW 40: CPT | Performed by: OBSTETRICS & GYNECOLOGY

## 2024-12-10 PROCEDURE — 96375 TX/PRO/DX INJ NEW DRUG ADDON: CPT

## 2024-12-10 PROCEDURE — G0378 HOSPITAL OBSERVATION PER HR: HCPCS

## 2024-12-10 RX ORDER — PRENATAL VIT/IRON FUM/FOLIC AC 27MG-0.8MG
1 TABLET ORAL DAILY
Status: DISCONTINUED | OUTPATIENT
Start: 2024-12-10 | End: 2024-12-12 | Stop reason: HOSPADM

## 2024-12-10 RX ORDER — ENOXAPARIN SODIUM 100 MG/ML
40 INJECTION SUBCUTANEOUS DAILY
Status: DISCONTINUED | OUTPATIENT
Start: 2024-12-10 | End: 2024-12-12

## 2024-12-10 RX ORDER — DIPHENHYDRAMINE HYDROCHLORIDE 50 MG/ML
25 INJECTION INTRAMUSCULAR; INTRAVENOUS EVERY 6 HOURS PRN
Status: DISCONTINUED | OUTPATIENT
Start: 2024-12-10 | End: 2024-12-12 | Stop reason: HOSPADM

## 2024-12-10 RX ORDER — ACETAMINOPHEN 325 MG/1
650 TABLET ORAL EVERY 4 HOURS PRN
Status: DISCONTINUED | OUTPATIENT
Start: 2024-12-10 | End: 2024-12-12 | Stop reason: HOSPADM

## 2024-12-10 RX ORDER — HYDROXYZINE HYDROCHLORIDE 50 MG/1
50 TABLET, FILM COATED ORAL
Status: DISCONTINUED | OUTPATIENT
Start: 2024-12-10 | End: 2024-12-11

## 2024-12-10 RX ORDER — DOCUSATE SODIUM 100 MG/1
100 CAPSULE, LIQUID FILLED ORAL 2 TIMES DAILY
Status: DISCONTINUED | OUTPATIENT
Start: 2024-12-10 | End: 2024-12-12 | Stop reason: HOSPADM

## 2024-12-10 RX ORDER — METOCLOPRAMIDE HYDROCHLORIDE 5 MG/ML
10 INJECTION INTRAMUSCULAR; INTRAVENOUS EVERY 6 HOURS PRN
Status: DISCONTINUED | OUTPATIENT
Start: 2024-12-10 | End: 2024-12-12 | Stop reason: HOSPADM

## 2024-12-10 RX ORDER — ACETAMINOPHEN 650 MG/1
650 SUPPOSITORY RECTAL EVERY 4 HOURS PRN
Status: DISCONTINUED | OUTPATIENT
Start: 2024-12-10 | End: 2024-12-12 | Stop reason: HOSPADM

## 2024-12-10 RX ORDER — DIPHENHYDRAMINE HYDROCHLORIDE 50 MG/ML
25 INJECTION INTRAMUSCULAR; INTRAVENOUS
Status: DISCONTINUED | OUTPATIENT
Start: 2024-12-10 | End: 2024-12-12 | Stop reason: HOSPADM

## 2024-12-10 RX ORDER — ONDANSETRON 2 MG/ML
4 INJECTION INTRAMUSCULAR; INTRAVENOUS EVERY 6 HOURS PRN
Status: DISCONTINUED | OUTPATIENT
Start: 2024-12-10 | End: 2024-12-12 | Stop reason: HOSPADM

## 2024-12-10 RX ORDER — ALBUTEROL SULFATE 0.83 MG/ML
2.5 SOLUTION RESPIRATORY (INHALATION)
Status: DISCONTINUED | OUTPATIENT
Start: 2024-12-10 | End: 2024-12-12 | Stop reason: HOSPADM

## 2024-12-10 RX ORDER — ONDANSETRON 4 MG/1
4 TABLET, ORALLY DISINTEGRATING ORAL EVERY 6 HOURS PRN
Status: DISCONTINUED | OUTPATIENT
Start: 2024-12-10 | End: 2024-12-12 | Stop reason: HOSPADM

## 2024-12-10 RX ORDER — METOCLOPRAMIDE 10 MG/1
10 TABLET ORAL EVERY 6 HOURS PRN
Status: DISCONTINUED | OUTPATIENT
Start: 2024-12-10 | End: 2024-12-12 | Stop reason: HOSPADM

## 2024-12-10 RX ORDER — DIPHENHYDRAMINE HYDROCHLORIDE 50 MG/ML
50 INJECTION INTRAMUSCULAR; INTRAVENOUS
Status: DISCONTINUED | OUTPATIENT
Start: 2024-12-10 | End: 2024-12-12 | Stop reason: HOSPADM

## 2024-12-10 RX ORDER — MEPERIDINE HYDROCHLORIDE 25 MG/ML
25 INJECTION INTRAMUSCULAR; INTRAVENOUS; SUBCUTANEOUS
Status: DISCONTINUED | OUTPATIENT
Start: 2024-12-10 | End: 2024-12-12 | Stop reason: HOSPADM

## 2024-12-10 RX ORDER — ALBUTEROL SULFATE 90 UG/1
1-2 INHALANT RESPIRATORY (INHALATION)
Status: DISCONTINUED | OUTPATIENT
Start: 2024-12-10 | End: 2024-12-12 | Stop reason: HOSPADM

## 2024-12-10 RX ORDER — LORAZEPAM 0.5 MG/1
0.5 TABLET ORAL EVERY 4 HOURS PRN
Status: DISCONTINUED | OUTPATIENT
Start: 2024-12-10 | End: 2024-12-12 | Stop reason: HOSPADM

## 2024-12-10 RX ORDER — PROCHLORPERAZINE MALEATE 10 MG
10 TABLET ORAL EVERY 6 HOURS PRN
Status: DISCONTINUED | OUTPATIENT
Start: 2024-12-10 | End: 2024-12-12 | Stop reason: HOSPADM

## 2024-12-10 RX ORDER — DIPHENHYDRAMINE HCL 25 MG
25 CAPSULE ORAL EVERY 6 HOURS PRN
Status: DISCONTINUED | OUTPATIENT
Start: 2024-12-10 | End: 2024-12-12 | Stop reason: HOSPADM

## 2024-12-10 RX ORDER — LEVOTHYROXINE SODIUM 50 UG/1
50 TABLET ORAL DAILY
Status: DISCONTINUED | OUTPATIENT
Start: 2024-12-10 | End: 2024-12-12 | Stop reason: HOSPADM

## 2024-12-10 RX ADMIN — IRON SUCROSE 300 MG: 20 INJECTION, SOLUTION INTRAVENOUS at 21:26

## 2024-12-10 RX ADMIN — DOCUSATE SODIUM 100 MG: 100 CAPSULE, LIQUID FILLED ORAL at 21:41

## 2024-12-10 RX ADMIN — PROCHLORPERAZINE EDISYLATE 10 MG: 5 INJECTION INTRAMUSCULAR; INTRAVENOUS at 23:48

## 2024-12-10 RX ADMIN — HYDROXYZINE HYDROCHLORIDE 50 MG: 50 TABLET, FILM COATED ORAL at 23:39

## 2024-12-10 RX ADMIN — ACETAMINOPHEN 650 MG: 325 TABLET, FILM COATED ORAL at 20:32

## 2024-12-10 ASSESSMENT — ACTIVITIES OF DAILY LIVING (ADL)
ADLS_ACUITY_SCORE: 22
ADLS_ACUITY_SCORE: 18

## 2024-12-10 NOTE — NURSING NOTE
"Chief Complaint   Patient presents with    Prenatal Care     28 weeks 6 days   ED 24  Possible kidney stones        Initial /76 (BP Location: Right arm, Cuff Size: Adult Large)   Wt 90.7 kg (199 lb 14.4 oz)   LMP  (LMP Unknown)   BMI 29.52 kg/m   Estimated body mass index is 29.52 kg/m  as calculated from the following:    Height as of 24: 1.753 m (5' 9\").    Weight as of this encounter: 90.7 kg (199 lb 14.4 oz).  BP completed using cuff size: regular    Questioned patient about current smoking habits.  Pt. has never smoked.    28w6d       The following HM Due: NONE        +FM daily         Aline Solano, CMA on 12/10/2024 at 3:43 PM   "

## 2024-12-10 NOTE — PROGRESS NOTES
Pt here to follow-up after recent observation 2024 at [facility] ([facility] was on divert that day) for acute L flank pain. Pain was similar to renal stones she has had before. Ur C&S Neg then, CBC showed worsening anemia (Hgb 9.6) but other labs WNL. Bilat Renal U/S showed normal right kidney, left kidney w/ mild to mod hydronephrosis but no stones seen. Still has pretty bad pain on left flank. Fetus active, no VB, SROM, UCs. Exam - Gen- Pt clearly in significant pain. Back - Left CVAT present, Right neg CVAT. Will send to L&D for pain management and to get expedited Urology consult for possible stent. While in L&D can get first IV Venofer infusion for her anemia.  labor/Premature rupture of membranes precautions reviewed.  RTC 2 weeks as scheduled.    Encounter Diagnoses   Name Primary?    Other hydronephrosis Yes    Anemia affecting first pregnancy     Pregnancy with history of uterine myomectomy     DVT (deep vein thrombosis) in pregnancy     White classification A1 gestational diabetes mellitus (GDM), diet controlled     Marginal insertion of umbilical cord affecting management of mother     Two vessel umbilical cord, antepartum     Pregnancy resulting from in vitro fertilization in third trimester     Primigravida of advanced maternal age in third trimester     Hypothyroidism affecting pregnancy in third trimester        Risk factors listed above are stable and being addressed as noted.    Dann Yee MD  [facility]

## 2024-12-10 NOTE — PROVIDER NOTIFICATION
12/10/24 1723   Provider Notification   Provider Name/Title Dr. Boyd   Method of Notification In Department     Discussed POC at desk, MD will put in orders when able. Will get saline lock placed, pt will be moved to PP once plan is established and PIV is in place. Will need to do Q shift NSTs.

## 2024-12-11 ENCOUNTER — APPOINTMENT (OUTPATIENT)
Dept: CT IMAGING | Facility: CLINIC | Age: 44
DRG: 832 | End: 2024-12-11
Attending: PHYSICIAN ASSISTANT
Payer: COMMERCIAL

## 2024-12-11 LAB — GLUCOSE SERPL-MCNC: 112 MG/DL (ref 70–99)

## 2024-12-11 PROCEDURE — G0378 HOSPITAL OBSERVATION PER HR: HCPCS

## 2024-12-11 PROCEDURE — 74176 CT ABD & PELVIS W/O CONTRAST: CPT

## 2024-12-11 PROCEDURE — 250N000013 HC RX MED GY IP 250 OP 250 PS 637: Performed by: OBSTETRICS & GYNECOLOGY

## 2024-12-11 PROCEDURE — 250N000013 HC RX MED GY IP 250 OP 250 PS 637: Performed by: STUDENT IN AN ORGANIZED HEALTH CARE EDUCATION/TRAINING PROGRAM

## 2024-12-11 PROCEDURE — 99222 1ST HOSP IP/OBS MODERATE 55: CPT | Mod: FS | Performed by: UROLOGY

## 2024-12-11 PROCEDURE — 250N000011 HC RX IP 250 OP 636: Performed by: OBSTETRICS & GYNECOLOGY

## 2024-12-11 PROCEDURE — 36415 COLL VENOUS BLD VENIPUNCTURE: CPT | Performed by: OBSTETRICS & GYNECOLOGY

## 2024-12-11 PROCEDURE — 99221 1ST HOSP IP/OBS SF/LOW 40: CPT | Performed by: STUDENT IN AN ORGANIZED HEALTH CARE EDUCATION/TRAINING PROGRAM

## 2024-12-11 PROCEDURE — 120N000013 HC R&B IMCU

## 2024-12-11 PROCEDURE — 82947 ASSAY GLUCOSE BLOOD QUANT: CPT | Performed by: OBSTETRICS & GYNECOLOGY

## 2024-12-11 PROCEDURE — 96376 TX/PRO/DX INJ SAME DRUG ADON: CPT

## 2024-12-11 RX ORDER — CALCIUM CARBONATE 500 MG/1
500 TABLET, CHEWABLE ORAL DAILY PRN
Status: DISCONTINUED | OUTPATIENT
Start: 2024-12-11 | End: 2024-12-12 | Stop reason: HOSPADM

## 2024-12-11 RX ORDER — HYDROXYZINE HYDROCHLORIDE 50 MG/1
50 TABLET, FILM COATED ORAL EVERY 6 HOURS PRN
Status: DISCONTINUED | OUTPATIENT
Start: 2024-12-11 | End: 2024-12-12 | Stop reason: HOSPADM

## 2024-12-11 RX ORDER — PANTOPRAZOLE SODIUM 40 MG/1
40 TABLET, DELAYED RELEASE ORAL DAILY
Status: DISCONTINUED | OUTPATIENT
Start: 2024-12-11 | End: 2024-12-12 | Stop reason: HOSPADM

## 2024-12-11 RX ORDER — MAGNESIUM HYDROXIDE/ALUMINUM HYDROXICE/SIMETHICONE 120; 1200; 1200 MG/30ML; MG/30ML; MG/30ML
30 SUSPENSION ORAL EVERY 4 HOURS PRN
Status: DISCONTINUED | OUTPATIENT
Start: 2024-12-11 | End: 2024-12-12 | Stop reason: HOSPADM

## 2024-12-11 RX ADMIN — ACETAMINOPHEN 650 MG: 325 TABLET, FILM COATED ORAL at 23:30

## 2024-12-11 RX ADMIN — ACETAMINOPHEN 650 MG: 325 TABLET, FILM COATED ORAL at 19:40

## 2024-12-11 RX ADMIN — ACETAMINOPHEN 650 MG: 325 TABLET, FILM COATED ORAL at 12:38

## 2024-12-11 RX ADMIN — ACETAMINOPHEN 650 MG: 325 TABLET, FILM COATED ORAL at 00:22

## 2024-12-11 RX ADMIN — HYDROXYZINE HYDROCHLORIDE 50 MG: 50 TABLET, FILM COATED ORAL at 07:28

## 2024-12-11 RX ADMIN — PROCHLORPERAZINE EDISYLATE 10 MG: 5 INJECTION INTRAMUSCULAR; INTRAVENOUS at 08:00

## 2024-12-11 RX ADMIN — ACETAMINOPHEN 650 MG: 325 TABLET, FILM COATED ORAL at 04:18

## 2024-12-11 RX ADMIN — PRENATAL VITAMINS-IRON FUMARATE 27 MG IRON-FOLIC ACID 0.8 MG TABLET 1 TABLET: at 19:39

## 2024-12-11 RX ADMIN — ALUMINUM HYDROXIDE, MAGNESIUM HYDROXIDE, AND SIMETHICONE 30 ML: 1200; 120; 1200 SUSPENSION ORAL at 08:48

## 2024-12-11 RX ADMIN — LEVOTHYROXINE SODIUM 50 MCG: 0.05 TABLET ORAL at 08:06

## 2024-12-11 RX ADMIN — PANTOPRAZOLE SODIUM 40 MG: 40 TABLET, DELAYED RELEASE ORAL at 14:34

## 2024-12-11 RX ADMIN — DOCUSATE SODIUM 100 MG: 100 CAPSULE, LIQUID FILLED ORAL at 19:39

## 2024-12-11 ASSESSMENT — ACTIVITIES OF DAILY LIVING (ADL)
ADLS_ACUITY_SCORE: 23
ADLS_ACUITY_SCORE: 22
ADLS_ACUITY_SCORE: 23
ADLS_ACUITY_SCORE: 23
ADLS_ACUITY_SCORE: 22
ADLS_ACUITY_SCORE: 23
ADLS_ACUITY_SCORE: 22
ADLS_ACUITY_SCORE: 23
ADLS_ACUITY_SCORE: 22
ADLS_ACUITY_SCORE: 23

## 2024-12-11 NOTE — PLAN OF CARE
This RN assumed care at 1900. Per MD, plan to send UA and transfer to postpartum unit for IV venofer and pain management until urology can consult.

## 2024-12-11 NOTE — H&P
L&D History and Physical   December 10, 2024  Anahi Hannah  [id number]      HPI: Anahi Hannah is a 44 year old  at 28w6d here from clinic for urgent urology consult.      She has been dealing with left flank pain since  and was diverted to [facility] that day where she had a renal ultrasound showing left hydronephrosis without nephrolithiasis.  Her pain improved and she was discharged, presented to outpt visit today and was extremely uncomfortable.  Says her pain today was up to a 9/10, it comes and goes and is currently a 4/10.  + FM.   She has allergies/bad reactions to many different types of opioids.      Pregnancy notable for:  --IVF pregnancy with donor egg  --AMA  --h/o LLE DVT on lovenox, last dose yesterday, factor V Leiden heterozygote  --h/o myomectomy, planning early CS at 36 wks (scheduled )  --hypothyroid on synthroid  --GDMA1  --anemia  --marginal cord insertion    OBHX:   OB History    Para Term  AB Living   1 0 0 0 0 0   SAB IAB Ectopic Multiple Live Births   0 0 0 0 0      # Outcome Date GA Lbr Jacobo/2nd Weight Sex Type Anes PTL Lv   1 Current                MedicalHX:   Past Medical History:   Diagnosis Date    Crushing injury of ankle and foot     DVT (deep vein thrombosis) in pregnancy 2024    DVT (deep venous thrombosis) (H) 07/10/2024    Early menopause     Factor 5 Leiden mutation, heterozygous (H) 2024    Fibroid uterus 2023    large fibroid removed at [facility]    Gastroesophageal reflux disease without esophagitis     Gestational diabetes     History of colposcopy with cervical biopsy     pos high risk HPV    Pelvic floor dysfunction     Pneumonia of right lower lobe due to infectious organism     Tension headache        SurgicalHX:   Past Surgical History:   Procedure Laterality Date    GYNECOLOGIC CRYOSURGERY      for HPV    HIP SURGERY      Congenital hip dysplasia    HYSTEROSCOPY,DIAGNOSTIC  2023    At [facility] - done just prior to  Myomectomy    LAPAROSCOPIC MYOMECTOMY UTERUS  02/20/2023    At [facility] - 10 cm myoma    WISDOM TOOTH EXTRACTION         Medications:   Current Facility-Administered Medications   Medication Dose Route Frequency Provider Last Rate Last Admin    acetaminophen (TYLENOL) tablet 650 mg  650 mg Oral Q4H PRN Autumn Boyd MD        Or    acetaminophen (TYLENOL) Suppository 650 mg  650 mg Rectal Q4H PRN Autumn Boyd MD        albuterol (PROVENTIL HFA/VENTOLIN HFA) inhaler  1-2 puff Inhalation Once PRN Autumn Boyd MD        albuterol (PROVENTIL) neb solution 2.5 mg  2.5 mg Nebulization Once PRN Autumn Boyd MD        diphenhydrAMINE (BENADRYL) injection 25 mg  25 mg Intravenous Once PRN Autumn Boyd MD        Or    diphenhydrAMINE (BENADRYL) injection 50 mg  50 mg Intravenous Once PRN Autumn Boyd MD        EPINEPHrine (ADRENALIN) kit 0.3 mg  0.3 mg Intramuscular Q5 Min PRN Autumn Boyd MD        famotidine (PEPCID) injection 20 mg  20 mg Intravenous Once PRN Autumn Boyd MD        iron sucrose (VENOFER) 300 mg in sodium chloride 0.9 % 265 mL intermittent infusion  300 mg Intravenous Q72H Autumn Boyd MD        LORazepam (ATIVAN) tablet 0.5 mg  0.5 mg Oral Q4H PRN Autumn Boyd MD        meperidine (DEMEROL) injection 25 mg  25 mg Intravenous Once PRN Autumn Boyd MD        sodium chloride 0.9% BOLUS 1,000 mL  1,000 mL Intravenous Once PRN Autumn Boyd MD           Allergies:  Allergies   Allergen Reactions    Fluconazole Hives    Hydromorphone Other (See Comments)     Other reaction(s): Hallucinations    Auditory Hallucinations    Onion GI Disturbance, Itching, Palpitations, Rash, Shortness Of Breath and Nausea    Egg Yolk Diarrhea    Amoxicillin Hives    Bacitracin-Polymyxin B Hives    Cephalosporins Other (See Comments)     Pt thinks she might be  allergic to cephalosporins- but not certain?    Hydrocortisone Unknown      Other reaction(s): Edema    Neomycin-Bacitracin Zn-Polymyx     Pcn [Penicillins] Hives    Polymyxin B Sulfate Hives    Terconazole     Garlic GI Disturbance, Itching, Rash and Nausea    Neomycin Sulfate Anxiety, Hives, Itching, Palpitations and Rash    Rosemary Oil Anxiety, GI Disturbance, Hives, Itching, Rash and Nausea       FamilyHX:    Family History   Problem Relation Age of Onset    Diabetes Mother     Heart Disease Mother     Hypertension Mother     Hyperlipidemia Mother     Arthritis Father     Depression Maternal Grandmother     Heart Disease Maternal Grandmother     Hypertension Maternal Grandmother     Hyperlipidemia Maternal Grandmother     Depression Maternal Grandfather     Heart Disease Maternal Grandfather     Hypertension Maternal Grandfather     Hyperlipidemia Maternal Grandfather     Cancer Paternal Grandfather     Breast Cancer No family hx of     Colon Cancer No family hx of     Cerebrovascular Disease No family hx of     Ovarian Cancer No family hx of        SocialHX:   Social History     Socioeconomic History    Marital status:      Spouse name: None    Number of children: None    Years of education: None    Highest education level: None   Occupational History    Occupation: [employer]   Tobacco Use    Smoking status: Never     Passive exposure: Never    Smokeless tobacco: Never   Vaping Use    Vaping status: Never Used   Substance and Sexual Activity    Alcohol use: No    Drug use: No    Sexual activity: Yes     Partners: Male     Birth control/protection: None     Social Drivers of Health     Financial Resource Strain: Low Risk  (10/22/2023)    Financial Resource Strain     Within the past 12 months, have you or your family members you live with been unable to get utilities (heat, electricity) when it was really needed?: No   Food Insecurity: Low Risk  (10/22/2023)    Food Insecurity     Within the past 12 months, did you worry that your food would run out before  you got money to buy more?: No     Within the past 12 months, did the food you bought just not last and you didn t have money to get more?: No   Transportation Needs: Low Risk  (10/22/2023)    Transportation Needs     Within the past 12 months, has lack of transportation kept you from medical appointments, getting your medicines, non-medical meetings or appointments, work, or from getting things that you need?: No   Physical Activity: Insufficiently Active (3/28/2023)    Received from [facility]    Exercise Vital Sign     Days of Exercise per Week: 3 days     Minutes of Exercise per Session: 30 min   Stress: No Stress Concern Present (3/28/2023)    Received from [facility]    [employer] [place] [employer] - Occupational Stress Questionnaire     Feeling of Stress : Not at all   Social Connections: Socially Integrated (3/28/2023)    Received from [facility]    Social Connection and Isolation Panel [NHANES]     Frequency of Communication with Friends and Family: More than three times a week     Frequency of Social Gatherings with Friends and Family: Once a week     Attends Yazidism Services: 1 to 4 times per year     Active Member of Clubs or Organizations: Yes     Attends Club or Organization Meetings: More than 4 times per year     Marital Status:    Interpersonal Safety: Low Risk  (10/23/2023)    Interpersonal Safety     Do you feel physically and emotionally safe where you currently live?: Yes     Within the past 12 months, have you been hit, slapped, kicked or otherwise physically hurt by someone?: No     Within the past 12 months, have you been humiliated or emotionally abused in other ways by your partner or ex-partner?: No   Housing Stability: Low Risk  (10/22/2023)    Housing Stability     Do you have housing? : Yes     Are you worried about losing your housing?: No       ROS: 10-point ROS negative except as in HPI     Physical Exam:  There were no  "vitals filed for this visit.  GEN: resting comfortably in bed, NAD - moving around comfortably  CV: regular rate, ext WWP  PULM: no increased work of breathing, no cough/wheeze  ABD: soft, gravid, non-tender, non-distended  No right CVA tenderness, mild TTP at left upper CVA    Labs:      Lab Results   Component Value Date    AS Negative 2024    HEPBANG Nonreactive 2024    CHPCRT Negative 2024    GCPCRT Negative 2024    HGB 9.6 (L) 2024       GBS Status:   No results found for: \"GBS\"    No results found for: \"PAP\"    A/P: Anahi Hannah is a 44 year old female  at 28w6d, here for left flank pain and left hydronephrosis, presenting from clinic for a direct admission for urgent urology consultation.  - plan for urology consultation, and will make NPO once a plan is made for procedure.  Reg diet now.  - hospitalist consult to assist in mgmt of medical comorbidities as noted above in HPI    - TID fetal monitoring while in-house and before/after any procedure/anesthesia  - would avoid ibuprofen at this stage in pregnancy  - planning delivery via CS at 36 wks due to h/o myomectomy  - anemia: IV venofer ordered     Autumn Boyd MD, MPH  [facility]     Total time spent was 45 minutes; this includes pre-work time, intra-service time, and post-work time including time spent on documentation which occurred on the date of service.    "

## 2024-12-11 NOTE — PLAN OF CARE
VSS, afebrile. Pain well managed with ordered PO medications. Pt felt nauseous this morning, resolved with meds and rest. CT completed. Advanced to reg diet this afternoon , encouraged fluids and frequent voiding. Straining urine. Spouse at bedside and is supportive. Pt resting comfortably at this time.      Problem: Adult Inpatient Plan of Care  Goal: Plan of Care Review    Outcome: Progressing  Flowsheets (Taken 12/11/2024 1536)  Plan of Care Reviewed With:   patient   spouse  Overall Patient Progress: improving  Goal: Patient-Specific Goal (Individualized)    Outcome: Progressing  Goal: Absence of Hospital-Acquired Illness or Injury  Outcome: Progressing  Intervention: Prevent Skin Injury  Recent Flowsheet Documentation  Taken 12/11/2024 0812 by Fannie Young, RN  Body Position: position changed independently  Intervention: Prevent Infection  Recent Flowsheet Documentation  Taken 12/11/2024 0812 by Fannie Young, RN  Infection Prevention:   rest/sleep promoted   personal protective equipment utilized   hand hygiene promoted   equipment surfaces disinfected   environmental surveillance performed  Goal: Optimal Comfort and Wellbeing  Outcome: Progressing  Intervention: Monitor Pain and Promote Comfort  Recent Flowsheet Documentation  Taken 12/11/2024 1238 by Fannie Young, RN  Pain Management Interventions: rest  Taken 12/11/2024 0812 by Fannie Young, RN  Pain Management Interventions: medication (see MAR)  Intervention: Provide Person-Centered Care  Recent Flowsheet Documentation  Taken 12/11/2024 0812 by Fannie Young RN  Trust Relationship/Rapport:   care explained   choices provided   emotional support provided   empathic listening provided   questions answered   questions encouraged   reassurance provided   thoughts/feelings acknowledged  Goal: Readiness for Transition of Care  Outcome: Progressing     Problem: Pain Acute  Goal: Optimal Pain Control and Function  Outcome:  Progressing  Intervention: Develop Pain Management Plan  Recent Flowsheet Documentation  Taken 12/11/2024 1238 by Fannie Young, RN  Pain Management Interventions: rest  Taken 12/11/2024 0812 by Fannie Young, RN  Pain Management Interventions: medication (see MAR)         Goal Outcome Evaluation:      Plan of Care Reviewed With: patient, spouse    Overall Patient Progress: improvingOverall Patient Progress: improving

## 2024-12-11 NOTE — PHARMACY-ADMISSION MEDICATION HISTORY
Pharmacist Admission Medication History    Admission medication history is complete. The information provided in this note is only as accurate as the sources available at the time of the update.    Information Source(s): Patient and CareEverywhere/SureScripts via phone    Pertinent Information:     Changes made to PTA medication list:  Added: instruction for multiple meds  Deleted: None  Changed: None    Allergies reviewed with patient and updates made in EHR: yes (included PEN-FAST score in allergy section)    Medication History Completed By: Angel Andre RPH 12/11/2024 2:10 PM    PTA Med List   Medication Sig Last Dose/Taking    aspirin 81 MG EC tablet Take 81 mg by mouth daily. 12/9/2024 Morning    enoxaparin ANTICOAGULANT (LOVENOX) 40 MG/0.4ML syringe Inject 40 mg subcutaneously daily. 12/9/2024 Morning    LANsoprazole (PREVACID) 30 MG DR capsule Take 1 capsule by mouth daily. 12/10/2024 Morning    levothyroxine (SYNTHROID/LEVOTHROID) 50 MCG tablet Take 1 tablet (50 mcg) by mouth daily. 12/11/2024 Morning    Prenatal MV & Min w/FA-DHA (PRENATAL GUMMIES PO) Take 2 each by mouth daily. Sean Brand 12/9/2024 Evening    pyridOXINE (VITAMIN  B-6) 25 MG tablet Take 25 mg by mouth daily 12/9/2024 Morning

## 2024-12-11 NOTE — PROVIDER NOTIFICATION
12/11/24 0655   Provider Notification   Provider Name/Title Dr. Boyd   Method of Notification Electronic Page   Request Evaluate-Remote   Notification Reason Medication Request       Pt having increased pain.  Requesting atarax every 6 hours as she had good relief with bedtime dose.  MD modified order.

## 2024-12-11 NOTE — PLAN OF CARE
Goal Outcome Evaluation:      Plan of Care Reviewed With: patient    Overall Patient Progress: no change    Patient vital signs stable and meeting expected outcomes.  Pain being managed with tylenol and atarax overnight.  Patient still having intermittent left sided flank pain that is sharp and throbbing.  Plan for urology consult today.  Patient has been NPO since midnight.  IV iron infusion completed and IV saline locked. Updated  and patient on plan of care.  Patient able to sleep during the night.  Fetal monitoring done per L&D nurse.  Will continue to monitor.    Problem: Adult Inpatient Plan of Care  Goal: Plan of Care Review  Description: The Plan of Care Review/Shift note should be completed every shift.  The Outcome Evaluation is a brief statement about your assessment that the patient is improving, declining, or no change.  This information will be displayed automatically on your shift  note.  Outcome: Progressing  Flowsheets (Taken 12/11/2024 0514)  Plan of Care Reviewed With: patient  Overall Patient Progress: no change  Goal: Absence of Hospital-Acquired Illness or Injury  Outcome: Progressing  Intervention: Prevent Skin Injury  Recent Flowsheet Documentation  Taken 12/11/2024 0030 by Eli Ojeda RN  Body Position: position changed independently  Taken 12/10/2024 2120 by Eli Ojeda RN  Body Position: position changed independently  Intervention: Prevent Infection  Recent Flowsheet Documentation  Taken 12/11/2024 0030 by Eli Ojeda RN  Infection Prevention:   rest/sleep promoted   personal protective equipment utilized   hand hygiene promoted   equipment surfaces disinfected   environmental surveillance performed  Taken 12/10/2024 2120 by Eli Ojeda RN  Infection Prevention:   rest/sleep promoted   personal protective equipment utilized   hand hygiene promoted   equipment surfaces disinfected   environmental surveillance performed  Goal: Optimal Comfort and  Wellbeing  Outcome: Progressing  Intervention: Monitor Pain and Promote Comfort  Recent Flowsheet Documentation  Taken 12/11/2024 0022 by Eli Ojeda RN  Pain Management Interventions: medication (see MAR)  Taken 12/10/2024 2032 by Eli Ojeda RN  Pain Management Interventions: medication (see MAR)  Intervention: Provide Person-Centered Care  Recent Flowsheet Documentation  Taken 12/11/2024 0030 by Eli Ojeda RN  Trust Relationship/Rapport:   care explained   choices provided   emotional support provided   empathic listening provided   questions answered   questions encouraged   reassurance provided   thoughts/feelings acknowledged  Taken 12/10/2024 2120 by Eli Ojeda RN  Trust Relationship/Rapport:   care explained   choices provided   emotional support provided   empathic listening provided   questions answered   questions encouraged   reassurance provided   thoughts/feelings acknowledged  Goal: Readiness for Transition of Care  Outcome: Progressing     Problem: Pain Acute  Goal: Optimal Pain Control and Function  Outcome: Progressing  Intervention: Develop Pain Management Plan  Recent Flowsheet Documentation  Taken 12/11/2024 0022 by Eli Oejda RN  Pain Management Interventions: medication (see MAR)  Taken 12/10/2024 2032 by Eli Ojeda RN  Pain Management Interventions: medication (see MAR)

## 2024-12-11 NOTE — CONSULTS
[facility] Consultation by [facility] Urology    Anahi Hannah MRN# [id number]   Age: 44 year old YOB: 1980     Date of Admission:  12/10/2024    Reason for consult: Severe flank pain, hydro, pregnancy, eval for stent placement       Requesting PA/MD: Dr. Boyd       Level of consult: Consult, follow and place orders             Impression and Plan:   Impression/Assessment:   Anahi Hannah is a 44 year old female with left sided hydronephrosis and flank pain, possibly due to ureteral stone v. Gravid uterus   Pregnant 29w0d, IVF pregnancy with donor egg  Anemia  Gestational diabetes  Recurrent nephrolithiasis      Plan:   -NPO.  -Patient continues to have left-sided discomfort.  Previous ultrasound imaging did show evidence of hydronephrosis.  We discussed that this could be secondary to a ureteral stone, which she has a history of, or possibly due to compression of the ureter by her gravid uterus.  -We discussed possible ways that we can unobstruct the kidneys such as indwelling ureteral stents, which would likely remain in place until after scheduled  on 2025 or percutaneous nephrostomy tube placement.  We discussed each of these in depth including possible risks.    -We also discussed the possibility of a low-dose CT to evaluate for possible stone.  This was okayed by OB.  We discussed that if this shows likely distal ureteral stone, could consider continuing to try to pass.  The other option would be if it does show a larger higher stone that would consider possible stent placement versus nephrostomy tube placement.  Additionally, if there was no evidence of nephrolithiasis, suspect that the hydronephrosis would likely be due to gravid uterus and may worsen as uterus continues to grow throughout pregnancy.  -After discussion with patient and her , we will plan on low-dose CT imaging to evaluate for possible stone.  Once this returns, we will determine possible  next course of action versus continuing to monitor versus ureteral stent placement.  -IVF fluids   -Strain urine.  -Pain and nausea medication per primary service.  -Thank you for involving us in the care of this patient. We will continue to follow along.     ADDENDUM  CT imaging shows possibly distal left ureteral stone measuring approximately 6 mm.  With Dr. Reinoso visit with patient, she was feeling quite a bit better.  At this time, possible indwelling ureteral stent placement would be quite late this evening.  After discussion with patient's nurse and patient's nurses discussion with patient, we will plan on diet for today and n.p.o. at midnight to reassess for possible stent placement.  Continue to push fluids and strain urine.  Continue with pain and nausea management per primary service.  Will reassess in the morning if we do need to go forward with possible ureteral stent placement.    Gosia Aguiar PA-C  [facility] Urology   [phone]               Chief Complaint:   Flank pain     History is obtained from the patient and EMR.         History of Present Illness:   This patient is a 44 year old  at 29w0d, here from clinic due to persistent left sided flank pain.  She started to have left-sided flank pain on 2024.  She was diverted to [facility], where she had renal ultrasound imaging showing left-sided hydronephrosis with no noted stones.  Patient to continue with conservative management.  Pain improved and was discharged home.    Yesterday, pain returned rather severe and she presented for direct admission and consideration of possible ureteral stent placement.  Patient has history of left lower extremity DVT on Lovenox.  She is also heterogeneous for factor V Leiden.  She is scheduled for an early  at 36 weeks.  History of myomectomy.  She also has gestational diabetes.    Urinalysis did show evidence of contamination was not particularly convincing for infection.  Patient has  known anemia in pregnancy.  She endorses nausea and feeling hot, but has not had vomiting.  Patient also has a history of nephrolithiasis having passed 3 stones on her own.  Also family history of nephrolithiasis in several members.  She does note this morning that she has been having more urgency and frequency of urination.  She continues to have left-sided flank pain that the pain medicine is not helping particularly well.    Tmax 99.  Occasional tachycardia.  WBC 13.5.   at the bedside.           Past Medical History:     Past Medical History:   Diagnosis Date    Crushing injury of ankle and foot     DVT (deep vein thrombosis) in pregnancy 07/22/2024    DVT (deep venous thrombosis) (H) 07/10/2024    Early menopause 2021    Factor 5 Leiden mutation, heterozygous (H) 07/29/2024    Fibroid uterus 02/2023    large fibroid removed at [facility]    Gastroesophageal reflux disease without esophagitis     Gestational diabetes     History of colposcopy with cervical biopsy     pos high risk HPV    Pelvic floor dysfunction     Pneumonia of right lower lobe due to infectious organism     Tension headache              Past Surgical History:     Past Surgical History:   Procedure Laterality Date    GYNECOLOGIC CRYOSURGERY  2011    for HPV    HIP SURGERY      Congenital hip dysplasia    HYSTEROSCOPY,DIAGNOSTIC  02/20/2023    At [facility] - done just prior to Myomectomy    LAPAROSCOPIC MYOMECTOMY UTERUS  02/20/2023    At [facility] - 10 cm myoma    WISDOM TOOTH EXTRACTION            Social History:     Never smoker.  .         Family History:     Family History   Problem Relation Age of Onset    Diabetes Mother     Heart Disease Mother     Hypertension Mother     Hyperlipidemia Mother     Arthritis Father     Depression Maternal Grandmother     Heart Disease Maternal Grandmother     Hypertension Maternal Grandmother     Hyperlipidemia Maternal Grandmother     Depression Maternal Grandfather     Heart Disease Maternal Grandfather      Hypertension Maternal Grandfather     Hyperlipidemia Maternal Grandfather     Cancer Paternal Grandfather     Breast Cancer No family hx of     Colon Cancer No family hx of     Cerebrovascular Disease No family hx of     Ovarian Cancer No family hx of    Family history of nephrolithiasis in her father, brother, and maternal grandfather.         Allergies:     Allergies   Allergen Reactions    Fluconazole Hives    Hydromorphone Other (See Comments)     Other reaction(s): Hallucinations    Auditory Hallucinations    Onion GI Disturbance, Itching, Palpitations, Rash, Shortness Of Breath and Nausea    Egg Yolk Diarrhea    Amoxicillin Hives    Bacitracin-Polymyxin B Hives    Cephalosporins Other (See Comments)     Pt thinks she might be  allergic to cephalosporins- but not certain?    Hydrocortisone Unknown     Other reaction(s): Edema    Neomycin-Bacitracin Zn-Polymyx     Pcn [Penicillins] Hives     PEN-FAST - Penicillin Allergy Risk Tool completed by [facility] December 11, 2024    Score: 0  Risk: Very Low  Assessment: Patient has a < 1% chance of a positive penicillin allergy test       Polymyxin B Sulfate Hives    Terconazole     Garlic GI Disturbance, Itching, Rash and Nausea    Neomycin Sulfate Anxiety, Hives, Itching, Palpitations and Rash    Rosemary Oil Anxiety, GI Disturbance, Hives, Itching, Rash and Nausea             Medications:     Current Facility-Administered Medications   Medication Dose Route Frequency Provider Last Rate Last Admin    acetaminophen (TYLENOL) tablet 650 mg  650 mg Oral Q4H PRN Autumn Boyd MD   650 mg at 12/11/24 1238    Or    acetaminophen (TYLENOL) Suppository 650 mg  650 mg Rectal Q4H PRN Autumn Boyd MD        albuterol (PROVENTIL HFA/VENTOLIN HFA) inhaler  1-2 puff Inhalation Once PRN Autumn Boyd MD        albuterol (PROVENTIL) neb solution 2.5 mg  2.5 mg Nebulization Once PRN Autumn Boyd MD        alum & mag hydroxide-simethicone (MAALOX)  suspension 30 mL  30 mL Oral Q4H PRN Mitch Cronin MD   30 mL at 12/11/24 0848    calcium carbonate (TUMS) chewable tablet 500 mg  500 mg Oral Daily PRN Liudmila Galvan DO        diphenhydrAMINE (BENADRYL) capsule 25 mg  25 mg Oral Q6H PRN Autumn Boyd MD        Or    diphenhydrAMINE (BENADRYL) injection 25 mg  25 mg Intravenous Q6H PRN Autumn Boyd MD        diphenhydrAMINE (BENADRYL) injection 25 mg  25 mg Intravenous Once PRN Autumn Boyd MD        Or    diphenhydrAMINE (BENADRYL) injection 50 mg  50 mg Intravenous Once PRN Autumn Boyd MD        docusate sodium (COLACE) capsule 100 mg  100 mg Oral BID Autumn Boyd MD   100 mg at 12/10/24 2141    [Held by provider] enoxaparin ANTICOAGULANT (LOVENOX) injection 40 mg  40 mg Subcutaneous Daily Autumn Boyd MD        EPINEPHrine (ADRENALIN) kit 0.3 mg  0.3 mg Intramuscular Q5 Min PRN Autumn Boyd MD        famotidine (PEPCID) injection 20 mg  20 mg Intravenous Once PRN Autumn Boyd MD        hydrOXYzine HCl (ATARAX) tablet 50 mg  50 mg Oral Q6H PRN Autumn Boyd MD   50 mg at 12/11/24 0728    iron sucrose (VENOFER) 300 mg in sodium chloride 0.9 % 290 mL intermittent infusion  300 mg Intravenous Q72H Autumn Boyd MD   Stopped at 12/10/24 2300    levothyroxine (SYNTHROID/LEVOTHROID) tablet 50 mcg  50 mcg Oral Daily Autumn Boyd MD   50 mcg at 12/11/24 0806    LORazepam (ATIVAN) tablet 0.5 mg  0.5 mg Oral Q4H PRN Autumn Boyd MD        meperidine (DEMEROL) injection 25 mg  25 mg Intravenous Once PRN Autumn Boyd MD        metoclopramide (REGLAN) tablet 10 mg  10 mg Oral Q6H PRN Autumn Boyd MD        Or    metoclopramide (REGLAN) injection 10 mg  10 mg Intravenous Q6H PRN Autumn Boyd MD        ondansetron (ZOFRAN ODT) ODT tab 4 mg  4 mg Oral Q6H PRN Autumn Boyd MD        Or    ondansetron (ZOFRAN) injection 4  mg  4 mg Intravenous Q6H PRN Autumn Boyd MD        pantoprazole (PROTONIX) EC tablet 40 mg  40 mg Oral Daily Liudmila Galvan, DO   40 mg at 24 1434    Patient is already receiving anticoagulation with heparin, enoxaparin (LOVENOX), warfarin (COUMADIN)  or other anticoagulant medication   Does not apply Continuous PRN Autumn Boyd MD        prenatal multivitamin w/iron per tablet 1 tablet  1 tablet Oral Daily Autumn Boyd MD        prochlorperazine (COMPAZINE) tablet 10 mg  10 mg Oral Q6H PRN Autumn Boyd MD        Or    prochlorperazine (COMPAZINE) injection 10 mg  10 mg Intravenous Q6H PRN Autumn Boyd MD   10 mg at 24 0800    sodium chloride 0.9% BOLUS 1,000 mL  1,000 mL Intravenous Once PRN Autumn Boyd MD                 Review of Systems:   A comprehensive 10-point review of systems was performed and found to be negative except as described in the HPI.     /77 (BP Location: Right arm, Patient Position: Sitting, Cuff Size: Adult Regular)   Pulse 105   Temp 99  F (37.2  C) (Oral)   Resp 18   LMP  (LMP Unknown)   SpO2 97%   PSYCH: NAD  EYES: EOMI  MOUTH: MMM  NECK: Supple, no notable adenopathy  RESP: Unlabored breathing  CARDIAC: Regular radial pulse  SKIN: Warm, no rashes  ABD: soft, , bilateral flank tenderness with palpation, L>R  NEURO: AAO x3  URO: Urinating on own.  Recently more frequency.          Data:     Lab Results   Component Value Date    WBC 13.5 (H) 12/10/2024    HGB 9.5 (L) 12/10/2024    HCT 29.3 (L) 12/10/2024    MCV 87 12/10/2024     12/10/2024     Lab Results   Component Value Date    CR 0.71 2024    CR 0.59 2024     Recent Labs   Lab 12/10/24  1959   COLOR Light Yellow   APPEARANCE Clear   URINEGLC Negative   URINEBILI Negative   URINEKETONE 40*   SG 1.021   URINEPH 6.0   PROTEIN 10*   NITRITE Negative   LEUKEST Negative   RBCU 1   WBCU 6*        All cultures:  Recent Labs   Lab  12/07/24  1811   CULTURE <10,000 CFU/mL Mixture of Urogenital Patricia      IMAGING    EXAM: US RENAL COMPLETE NON-VASCULAR  LOCATION: [facility]  DATE: 12/7/2024     INDICATION: Left flank pain  COMPARISON: None.  TECHNIQUE: Routine Bilateral Renal and Bladder Ultrasound.     FINDINGS:     RIGHT KIDNEY: 10.0 x 5.7 x 5.9 cm. Normal size and echogenicity without sonographically evident nephrolithiasis. No hydronephrosis.      LEFT KIDNEY: 11.2 x 5.7 x 7.1 cm. Normal size and echogenicity. There is mild to moderate hydronephrosis. No sonographically evident nephrolithiasis.      BLADDER: Obscured by bowel gas and gravid uterus.                                                                      IMPRESSION:  Mild to moderate left hydronephrosis, which could be related to distal stone or potentially compression by gravid uterus.    CT Abdomen Pelvis w/o Contrast    Result Date: 12/11/2024  CT ABDOMEN PELVIS W/O CONTRAST 12/11/2024 12:31 PM CLINICAL HISTORY: Abdominal pain. Question stone. Severe flank pain, history of stone, now more bladder symptoms.  TECHNIQUE: CT scan of the abdomen and pelvis was performed without IV contrast. Multiplanar reformats were obtained. Dose reduction techniques were used. CONTRAST: None. COMPARISON: July 11, 2017 FINDINGS: LOWER CHEST: No infiltrates or effusions. HEPATOBILIARY: No significant mass or bile duct dilatation. No calcified gallstones. PANCREAS: No significant mass, duct dilatation, or inflammatory change. SPLEEN: Normal size. ADRENAL GLANDS: No significant nodules. KIDNEYS/BLADDER: There is a calcification in the region of the distal left ureter, this was not present on the comparison from July 11, 2017. This calcification measures 6 mm in maximal dimension. I cannot exclude that this is an adjacent phlebolith as there is no dilated ureter proximal to this in this region, but a distal ureteral stone on the left could be present. No definite intrarenal  stones bilaterally demonstrated. BOWEL: No obstruction or inflammatory change. VASCULATURE: No abdominal aortic aneurysm. PELVIC ORGANS: No pelvic masses. Gravid uterus. OTHER: No free air or free fluid. MUSCULOSKELETAL: No frankly destructive bony lesions.     IMPRESSION: 1.  There is a calcification in the region of the distal left ureter measuring 6 mm. This may be in the ureter, without significant proximal hydroureter. 2.  No definite intrarenal stones bilaterally. ISRAEL MONTGOMERY MD   SYSTEM ID:  [id number]    Discussed with Dr. Kemar Aguiar PA-C   [facility] Urology  [phone]

## 2024-12-11 NOTE — CONSULTS
[facility]  Consult Note - Hospitalist Service  Date of Admission:  12/10/2024  Consult Requested by: Dr. Boyd  Reason for Consult: medical management    Assessment & Plan   Anahi Hannah is a 44 year old  female at 29w, who was admitted on 12/10/2024 with L flank pain and hydronephrosis.     L hydronephrosis  Directly admitted from OB clinic for L flank pain that started  and renal US that showed L hydronephrosis without nephrolithiasis.   - Urology consulted  - Pain control with tylenol and atarax   - Avoid NSAIDs at this stage of pregnancy per OB   - Allergy/intolerance to opioids    Hx LLE DVT  Factor V Leiden heterozygote  - Hold PTA lovenox pending urologic procedure   - Resume if no procedure planned    Hypothyroidism  Started on levothyroxine to achieve pregnancy. Not chronically hypothyroid.  - Continue PTA levothyroxine    GDMA1  Diet controlled.  on arrival.     Anemia  Hgb 9.5, stable from previous. Agree with iron infusion.    IUP   at 29w. OB primary.        Clinically Significant Risk Factors Present on Admission                # Drug Induced Coagulation Defect: home medication list includes an anticoagulant medication  # Drug Induced Platelet Defect: home medication list includes an antiplatelet medication        # Anemia: based on hgb <11                  Liudmila Galvan DO  Hospitalist Service  Securely message with [employer] (more info)  Text page via [employer] Paging/Directory   ______________________________________________________________________    Chief Complaint   Flank pain    History is obtained from the patient    History of Present Illness   Anahi Hannah is a 44 year old female who presents with flank pain. Hx of kidney stones. Urology offered stenting vs waiting to see if stone passes on its own. Patient thinking about decision. Pain is tolerable at the moment, but it comes and goes.     Past Medical History    Past Medical  History:   Diagnosis Date    Crushing injury of ankle and foot     DVT (deep vein thrombosis) in pregnancy 07/22/2024    DVT (deep venous thrombosis) (H) 07/10/2024    Early menopause 2021    Factor 5 Leiden mutation, heterozygous (H) 07/29/2024    Fibroid uterus 02/2023    large fibroid removed at [place]    Gastroesophageal reflux disease without esophagitis     Gestational diabetes     History of colposcopy with cervical biopsy     pos high risk HPV    Pelvic floor dysfunction     Pneumonia of right lower lobe due to infectious organism     Tension headache        Past Surgical History   Past Surgical History:   Procedure Laterality Date    GYNECOLOGIC CRYOSURGERY  2011    for HPV    HIP SURGERY      Congenital hip dysplasia    HYSTEROSCOPY,DIAGNOSTIC  02/20/2023    At [place] - done just prior to Myomectomy    LAPAROSCOPIC MYOMECTOMY UTERUS  02/20/2023    At [place] - 10 cm myoma    WISDOM TOOTH EXTRACTION         Medications   I have reviewed this patient's current medications  Medications Prior to Admission   Medication Sig Dispense Refill Last Dose/Taking    acetone urine (KETOSTIX) test strip Use 1 strip daily (upon awaking for the day) to check urine ketones per  directions. 50 strip 1     aspirin 81 MG EC tablet Aspirin Oral    daily    active       blood glucose (NO BRAND SPECIFIED) test strip Use to test blood sugar 4 times daily or as directed. To accompany: Blood Glucose Monitor Brands: per insurance. 150 strip 3     blood glucose monitoring (NO BRAND SPECIFIED) meter device kit Use to test blood sugar 4 times daily or as directed. Preferred blood glucose meter OR supplies to accompany: Blood Glucose Monitor Brands: per insurance. 1 kit 0     enoxaparin ANTICOAGULANT (LOVENOX) 40 MG/0.4ML syringe        LANsoprazole (PREVACID) 30 MG DR capsule Take 1 capsule by mouth daily. 90 capsule 1     levothyroxine (SYNTHROID/LEVOTHROID) 50 MCG tablet Take 1 tablet (50 mcg) by mouth daily. 90 tablet 2      Prenatal MV & Min w/FA-DHA (PRENATAL GUMMIES PO)        pyridOXINE (VITAMIN  B-6) 25 MG tablet Take 25 mg by mouth daily       thin (NO BRAND SPECIFIED) lancets Use with lancing device to check glucose four times per day. To accompany: Blood Glucose Monitor Brands: per insurance. 200 each 1              Physical Exam   Vital Signs: Temp: 99  F (37.2  C) Temp src: Oral BP: 126/77 Pulse: 105   Resp: 18 SpO2: 97 % O2 Device: None (Room air)    Weight: 0 lbs 0 oz    Constitutional: Awake, alert, no distress, and cooperative  Cardiovascular: Regular rate and rhythm, normal S1 and S2, no S3 or S4, and no murmur noted  Respiratory: No increased work of breathing, good air exchange, clear to auscultation bilaterally, no crackles or wheezing    Medical Decision Making       35 MINUTES SPENT BY ME on the date of service doing chart review, history, exam, documentation & further activities per the note.      Data     I have personally reviewed the following data over the past 24 hrs:    13.5 (H)  \   9.5 (L)   / 431     N/A N/A N/A /  112 (H)   N/A N/A N/A \

## 2024-12-11 NOTE — PROGRESS NOTES
Antepartum Progress Note    S:  Not presently in pain.  Baby active    O:  VSS, afebrile    Appreciate Urology Consult    ADDENDUM  CT imaging shows possibly distal left ureteral stone measuring approximately 6 mm.  With Dr. Reinoso visit with patient, she was feeling quite a bit better.  At this time, possible indwelling ureteral stent placement would be quite late this evening.  After discussion with patient's nurse and patient's nurses discussion with patient, we will plan on diet for today and n.p.o. at midnight to reassess for possible stent placement.  Continue to push fluids and strain urine.  Continue with pain and nausea management per primary service.  Will reassess in the morning if we do need to go forward with possible ureteral stent placement.      A/P:  45yo at 29wks with distal left ureteral stone - management per Urology as above.  Would be a candidate for discharge if stone passes as there are no OB concerns presently    Prakash Cronin MD

## 2024-12-12 VITALS
SYSTOLIC BLOOD PRESSURE: 104 MMHG | HEART RATE: 93 BPM | OXYGEN SATURATION: 98 % | TEMPERATURE: 98.5 F | DIASTOLIC BLOOD PRESSURE: 61 MMHG | RESPIRATION RATE: 15 BRPM

## 2024-12-12 PROCEDURE — G0378 HOSPITAL OBSERVATION PER HR: HCPCS

## 2024-12-12 PROCEDURE — 250N000013 HC RX MED GY IP 250 OP 250 PS 637: Performed by: OBSTETRICS & GYNECOLOGY

## 2024-12-12 PROCEDURE — 99231 SBSQ HOSP IP/OBS SF/LOW 25: CPT | Performed by: STUDENT IN AN ORGANIZED HEALTH CARE EDUCATION/TRAINING PROGRAM

## 2024-12-12 PROCEDURE — 99231 SBSQ HOSP IP/OBS SF/LOW 25: CPT | Performed by: PHYSICIAN ASSISTANT

## 2024-12-12 RX ORDER — ENOXAPARIN SODIUM 100 MG/ML
40 INJECTION SUBCUTANEOUS DAILY
Status: DISCONTINUED | OUTPATIENT
Start: 2024-12-12 | End: 2024-12-12 | Stop reason: HOSPADM

## 2024-12-12 RX ADMIN — ACETAMINOPHEN 650 MG: 325 TABLET, FILM COATED ORAL at 07:51

## 2024-12-12 RX ADMIN — LEVOTHYROXINE SODIUM 50 MCG: 0.05 TABLET ORAL at 07:51

## 2024-12-12 ASSESSMENT — ACTIVITIES OF DAILY LIVING (ADL)
ADLS_ACUITY_SCORE: 23
ADLS_ACUITY_SCORE: 27
ADLS_ACUITY_SCORE: 23
ADLS_ACUITY_SCORE: 27
ADLS_ACUITY_SCORE: 23

## 2024-12-12 NOTE — PROGRESS NOTES
[facility] Urology Progress Note          Assessment and Plan:     Assessment:    Left sided hydronephrosis, likely due to distal ureteral stone    Pregnant 29w0d, IVF pregnancy with donor egg    Anemia    Gestational diabetes    Recurrent nephrolithiasis      Plan:   -I discussed with the patient that we have similar options today as continuing to monitor, possible left ureteral stent, or left-sided nephrostomy tube placement.  -Given that patient has been doing well for 24 hours, I think it is reasonable for us to let her continue to try to pass the stone with pain management.  If okay with medicine and patient agrees, okay to discharge urology perspective.  -However, I did discuss with the patient that if she would have return of the severe pain, then I would recommend that we go forward with indwelling ureteral stent placement, which would then remain in place until after her scheduled .  -If patient and OB comfortable, okay to discharge home with continued trial of passage and pain management.  If she returns to the hospital, would then recommend stent placement.  -Okay for diet today.  I have tentatively made her n.p.o. at midnight in case she stays overnight again.      Gosia Aguiar PA-C   [facility] Urology  [phone]               Interval History:     Doing well.  Denies having much for pain over the last 24 hours.  Has been controlled with Atarax and Tylenol.  Denies nausea, vomiting, fevers, chills.  Occasional tachycardia.  Tmax 99.  Has been straining urine, but no stones seen.              Review of Systems:     The 5 point Review of Systems is negative other than noted in the HPI             Medications:     Current Facility-Administered Medications   Medication Dose Route Frequency Provider Last Rate Last Admin    acetaminophen (TYLENOL) tablet 650 mg  650 mg Oral Q4H PRN Autumn Boyd MD   650 mg at 24 0751    Or    acetaminophen (TYLENOL) Suppository  650 mg  650 mg Rectal Q4H PRN Autumn Boyd MD        albuterol (PROVENTIL HFA/VENTOLIN HFA) inhaler  1-2 puff Inhalation Once PRN Autumn Boyd MD        albuterol (PROVENTIL) neb solution 2.5 mg  2.5 mg Nebulization Once PRN Autumn Boyd MD        alum & mag hydroxide-simethicone (MAALOX) suspension 30 mL  30 mL Oral Q4H PRN Mitch Cronin MD   30 mL at 12/11/24 0848    calcium carbonate (TUMS) chewable tablet 500 mg  500 mg Oral Daily PRN Liudmila Galvan DO        diphenhydrAMINE (BENADRYL) capsule 25 mg  25 mg Oral Q6H PRN Autumn Boyd MD        Or    diphenhydrAMINE (BENADRYL) injection 25 mg  25 mg Intravenous Q6H PRN Autumn Boyd MD        diphenhydrAMINE (BENADRYL) injection 25 mg  25 mg Intravenous Once PRN Autumn Boyd MD        Or    diphenhydrAMINE (BENADRYL) injection 50 mg  50 mg Intravenous Once PRN Autumn Boyd MD        docusate sodium (COLACE) capsule 100 mg  100 mg Oral BID Autumn Boyd MD   100 mg at 12/11/24 1939    enoxaparin ANTICOAGULANT (LOVENOX) injection 40 mg  40 mg Subcutaneous Daily Liudmila Galvan DO        EPINEPHrine (ADRENALIN) kit 0.3 mg  0.3 mg Intramuscular Q5 Min PRN Autumn Boyd MD        famotidine (PEPCID) injection 20 mg  20 mg Intravenous Once PRN Autumn Boyd MD        hydrOXYzine HCl (ATARAX) tablet 50 mg  50 mg Oral Q6H PRN Autumn Boyd MD   50 mg at 12/11/24 0728    iron sucrose (VENOFER) 300 mg in sodium chloride 0.9 % 290 mL intermittent infusion  300 mg Intravenous Q72H Autumn Boyd MD   Stopped at 12/10/24 2300    levothyroxine (SYNTHROID/LEVOTHROID) tablet 50 mcg  50 mcg Oral Daily Autumn Boyd MD   50 mcg at 12/12/24 0751    LORazepam (ATIVAN) tablet 0.5 mg  0.5 mg Oral Q4H PRN Autumn Boyd MD        meperidine (DEMEROL) injection 25 mg  25 mg Intravenous Once PRN Autumn Boyd MD        metoclopramide (REGLAN)  "tablet 10 mg  10 mg Oral Q6H PRN Autumn Boyd MD        Or    metoclopramide (REGLAN) injection 10 mg  10 mg Intravenous Q6H PRN Autumn Boyd MD        ondansetron (ZOFRAN ODT) ODT tab 4 mg  4 mg Oral Q6H PRN Autumn Boyd MD        Or    ondansetron (ZOFRAN) injection 4 mg  4 mg Intravenous Q6H PRN Autumn Boyd MD        pantoprazole (PROTONIX) EC tablet 40 mg  40 mg Oral Daily Liudmila Galvan, DO   40 mg at 12/11/24 1434    Patient is already receiving anticoagulation with heparin, enoxaparin (LOVENOX), warfarin (COUMADIN)  or other anticoagulant medication   Does not apply Continuous PRN Autumn Boyd MD        prenatal multivitamin w/iron per tablet 1 tablet  1 tablet Oral Daily Autumn Boyd MD   1 tablet at 12/11/24 1939    prochlorperazine (COMPAZINE) tablet 10 mg  10 mg Oral Q6H PRN Autumn Boyd MD        Or    prochlorperazine (COMPAZINE) injection 10 mg  10 mg Intravenous Q6H PRN Autumn Boyd MD   10 mg at 12/11/24 0800    sodium chloride 0.9% BOLUS 1,000 mL  1,000 mL Intravenous Once PRN Autumn Boyd MD                      Physical Exam:   Vitals were reviewed  Patient Vitals for the past 8 hrs:   BP Temp Temp src Pulse Resp   12/12/24 0745 104/61 98.5  F (36.9  C) Oral 93 15     GEN: NAD, lying in bed  EYES: EOMI  MOUTH: MMM  NECK: Supple  RESP: Unlabored breathing  NEURO: AAO  URO: Urinating on own           Data:   No results found for: \"NTBNPI\", \"NTBNP\"  Lab Results   Component Value Date    WBC 13.5 (H) 12/10/2024    WBC 12.9 (H) 12/07/2024    WBC 12.4 (H) 11/26/2024    HGB 9.5 (L) 12/10/2024    HGB 9.6 (L) 12/07/2024    HGB 10.3 (L) 11/26/2024    HCT 29.3 (L) 12/10/2024    HCT 30.0 (L) 12/07/2024    HCT 30.4 (L) 11/26/2024    MCV 87 12/10/2024    MCV 88 12/07/2024    MCV 87 11/26/2024     12/10/2024     12/07/2024     11/26/2024     Lab Results   Component Value Date    INR 0.98 07/10/2024      "

## 2024-12-12 NOTE — DISCHARGE SUMMARY
[facility] Discharge Summary    Anahi Hannah MRN# [id number]   Age: 44 year old YOB: 1980     Date of Admission:  12/10/2024  Date of Discharge:  24     Admitting Physician:  Autumn Boyd MD  Discharge Physician:  Khurram Baker MD      Admission Diagnosis:  --IUP at 28w6d  --left hydronephrosis; suspected nephrolithiasis   --IVF pregnancy with donor egg  --AMA  --h/o LLE DVT on lovenox, last dose yesterday, factor V Leiden heterozygote  --h/o myomectomy, planning early CS at 36 wks (scheduled )  --hypothyroid on synthroid  --GDMA1  --anemia  --marginal cord insertion    Discharge Diagnosis:  - IUP at 29w1d   - Same as above    Procedures:   - CT-A/P  - IV Fe infusion -Venofer    Consultations:    - Urology  - Medicine    Medications prior to admission:  - Lovenox  - Synthroid  - ASA    Brief History of Presentation:    Anahi Hannah is a 44 year old female  at 28w6d who was direct admit from clinic for urgent urology consultation iso of left flank pain and left hydronephrosis, with suspicion for kidney stone.     Hospital Course:    - She was admitted and vitally stable. Initially had pain on admission but this decreased and she had no pain the last 24 hours of hospitalization. She received only Tylenol and Atarax. Does not tolerate opioids.  - Urology consulted. CT-A/P performed showing 6 mm calcification in distal left ureter suspected to be stone. There was no left hydroureter. Options included continued trial of passage overnight on HD#2 vs ureteral stent vs nephrostomy tube. Opted for trial of passage. No obvious stone passage by morning of HD#3. As pain had essentially resolved she opted for forgo stent or nephrostomy tube and discharge with return precautions should pain recur and to strain urine.   - Received a IV Venofer infusion while inpatient.  - Hospitalist consulted for her chronic medical problems.    Discharge  Medications:     Review of your medicines        CONTINUE these medicines which have NOT CHANGED        Dose / Directions   acetone urine test strip  Commonly known as: KETOSTIX  Used for: White classification A1 gestational diabetes mellitus (GDM), diet controlled      Use 1 strip daily (upon awaking for the day) to check urine ketones per  directions.  Quantity: 50 strip  Refills: 1     aspirin 81 MG EC tablet      Dose: 81 mg  Take 81 mg by mouth daily.  Refills: 0     blood glucose monitoring meter device kit  Commonly known as: NO BRAND SPECIFIED  Used for: White classification A1 gestational diabetes mellitus (GDM), diet controlled      Use to test blood sugar 4 times daily or as directed. Preferred blood glucose meter OR supplies to accompany: Blood Glucose Monitor Brands: per insurance.  Quantity: 1 kit  Refills: 0     blood glucose test strip  Commonly known as: NO BRAND SPECIFIED  Used for: White classification A1 gestational diabetes mellitus (GDM), diet controlled      Use to test blood sugar 4 times daily or as directed. To accompany: Blood Glucose Monitor Brands: per insurance.  Quantity: 150 strip  Refills: 3     enoxaparin ANTICOAGULANT 40 MG/0.4ML syringe  Commonly known as: LOVENOX      Dose: 40 mg  Inject 40 mg subcutaneously daily.  Refills: 0     LANsoprazole 30 MG DR capsule  Commonly known as: PREVACID  Used for: Gastroesophageal reflux disease without esophagitis      Dose: 30 mg  Take 1 capsule by mouth daily.  Quantity: 90 capsule  Refills: 1     levothyroxine 50 MCG tablet  Commonly known as: SYNTHROID/LEVOTHROID  Used for: Female infertility      Dose: 50 mcg  Take 1 tablet (50 mcg) by mouth daily.  Quantity: 90 tablet  Refills: 2     PRENATAL GUMMIES PO      Dose: 2 each  Take 2 each by mouth daily. Sean Brand  Refills: 0     pyridOXINE 25 MG tablet  Commonly known as: VITAMIN B6      Dose: 25 mg  Take 25 mg by mouth daily  Refills: 0     thin lancets  Commonly known as: NO  BRAND SPECIFIED  Used for: White classification A1 gestational diabetes mellitus (GDM), diet controlled      Use with lancing device to check glucose four times per day. To accompany: Blood Glucose Monitor Brands: per insurance.  Quantity: 200 each  Refills: 1              Discharge Instructions:  Call or present to labor and delivery if you experience:   -Regular painful contractions concerning for labor   -Leakage of fluid concerning for ruptured membranes   -Decreased fetal movement   -Bright red vaginal bleeding    -Headache, vision changes, upper abdominal pain, significant increase in swelling,   generalized unwell feeling   - Worsening flank pain  Resume Lovenox    Follow up:  - Follow-up with Dr. Yee on 12/20 as scheduled    Khurram Baker MD

## 2024-12-12 NOTE — PROGRESS NOTES
Antepartum Progress Note    S:  Not presently in pain. No pain for about 24 hours. No stone passage yet.  Baby active.    O:    Patient Vitals for the past 24 hrs:   BP Temp Temp src Pulse Resp   12/12/24 0745 104/61 98.5  F (36.9  C) Oral 93 15   12/11/24 2319 106/64 98.3  F (36.8  C) Oral 87 16      Abd: soft, non-tender, gravid    A/P:    43yo at 29w1d, HD#3 with distal left ureteral stone. Stayed overnight to see if stone would pass; does not appear to have. Has consulted with urology and decided against stent or nephrostomy tube. Feels ready for discharge.     - Discharge to home  - Resume ppx Lovenox  - Return to ED to worsening pain at which point would plan for urology intervention. Otherwise no urology f/up needed.  - Appreciate urology consult    Khurram Baker MD

## 2024-12-12 NOTE — PLAN OF CARE
VSS. Up ad chauncey. Voiding without difficulty - straining urine. No stone passed during this shift. Pain well managed with Tylenol. Spouse supportive and at bedside. Was able to get rest overnight.    Goal Outcome Evaluation:      Plan of Care Reviewed With: patient    Overall Patient Progress: improvingOverall Patient Progress: improving    Problem: Adult Inpatient Plan of Care  Goal: Plan of Care Review  Description: The Plan of Care Review/Shift note should be completed every shift.  The Outcome Evaluation is a brief statement about your assessment that the patient is improving, declining, or no change.  This information will be displayed automatically on your shift  note.  12/12/2024 0343 by Jagruti Reilly RN  Outcome: Progressing  Flowsheets (Taken 12/12/2024 0343)  Plan of Care Reviewed With: patient  Overall Patient Progress: improving  12/12/2024 0343 by Jagruti Reilly RN  Outcome: Progressing  Flowsheets (Taken 12/12/2024 0343)  Plan of Care Reviewed With: patient  Overall Patient Progress: improving  Goal: Absence of Hospital-Acquired Illness or Injury  Intervention: Prevent Skin Injury  Recent Flowsheet Documentation  Taken 12/11/2024 2319 by Jagruti Reilly RN  Body Position: position changed independently  Intervention: Prevent Infection  Recent Flowsheet Documentation  Taken 12/11/2024 2319 by Jagruti Reilly RN  Infection Prevention:   rest/sleep promoted   hand hygiene promoted   environmental surveillance performed  Goal: Optimal Comfort and Wellbeing  Intervention: Monitor Pain and Promote Comfort  Recent Flowsheet Documentation  Taken 12/11/2024 1940 by Jagruti Reilly RN  Pain Management Interventions:   medication (see MAR)   rest  Intervention: Provide Person-Centered Care  Recent Flowsheet Documentation  Taken 12/11/2024 2319 by Jagruti Reilly RN  Trust Relationship/Rapport:   care explained   choices provided   emotional support provided   questions  answered   questions encouraged   thoughts/feelings acknowledged     Problem: Pain Acute  Goal: Optimal Pain Control and Function  Intervention: Develop Pain Management Plan  Recent Flowsheet Documentation  Taken 12/11/2024 1940 by Jagruti Reilly, RN  Pain Management Interventions:   medication (see MAR)   rest     Problem: Suicide Risk  Goal: Absence of Self-Harm  Intervention: Promote Psychosocial Wellbeing  Recent Flowsheet Documentation  Taken 12/11/2024 2319 by Jagruti Reilly, RN  Family/Support System Care: support provided

## 2024-12-12 NOTE — PROGRESS NOTES
[facility]    Medicine Consult Progress Note - Hospitalist Service    Date of Admission:  12/10/2024    Assessment & Plan   Anahi Hannah is a 44 year old  female at 29w, who was admitted on 12/10/2024 with L flank pain and hydronephrosis.     Ok to discharge from a medicine perspective    L hydronephrosis, likely due to distal ureteral stone  Directly admitted from OB clinic for L flank pain that started  and renal US that showed L hydronephrosis without nephrolithiasis. CT showed calcification in distal L ureter.  - Urology consulted    - given that patient has done well for 24 hours, reasonable to let her try to pass stone with pain management at home and return to hospital if she develops severe pain  - Pain control with tylenol and atarax   - Avoid NSAIDs at this stage of pregnancy per OB   - Allergy/intolerance to opioids    Hx LLE DVT  Factor V Leiden heterozygote  - Resume PTA lovenox    Hypothyroidism  Started on levothyroxine to achieve pregnancy. Not chronically hypothyroid.  - Continue PTA levothyroxine    GDMA1  Diet controlled.  on arrival.     Anemia  Hgb 9.5, stable from previous. Agree with iron infusion.    IUP   at 29w. OB primary.           Diet: Room Service  NPO per Anesthesia Guidelines for Procedure/Surgery Except for: Meds  Moderate Consistent Carb (60 g CHO per Meal) Diet  Diet    DVT Prophylaxis: Enoxaparin (Lovenox) SQ  [place]umm[place] Catheter: Not present  Lines: None     Cardiac Monitoring: None  Code Status: Full Code      Clinically Significant Risk Factors                                         Social Drivers of Health    Physical Activity: Insufficiently Active (3/28/2023)    [facility]    Exercise Vital Sign     Days of Exercise per Week: 3 days     Minutes of Exercise per Session: 30 min                     Liudmila Galvan DO  Hospitalist Service  [facility]  Securely message with  Gwen (more info)  Text page via [facility] Paging/Directory   ______________________________________________________________________    Interval History   No acute overnight events.  Remains pain free. Hoping to discharge today with plan to return if pain recurs.     Physical Exam   Vital Signs: Temp: 98.5  F (36.9  C) Temp src: Oral BP: 104/61 Pulse: 93   Resp: 15 SpO2: 98 % O2 Device: None (Room air)    Weight: 0 lbs 0 oz    Constitutional: Awake, alert, no distress, and cooperative  Cardiovascular: Regular rate and rhythm, normal S1 and S2, no S3 or S4, and no murmur noted  Respiratory: No increased work of breathing, good air exchange, clear to auscultation bilaterally, no crackles or wheezing  Gastrointestinal: Abdomen soft, non-tender, non-distended. BS normal. No masses, organomegaly     Medical Decision Making       30 MINUTES SPENT BY ME on the date of service doing chart review, history, exam, documentation & further activities per the note.      Data

## 2024-12-12 NOTE — DISCHARGE INSTRUCTIONS
Kidney Stone: Care Instructions  Your Care Instructions     Kidney stones are formed when salts, minerals, and other substances normally found in the urine clump together. They can be as small as grains of sand or, rarely, as large as golf balls.  While the stone is traveling through the ureter, which is the tube that carries urine from the kidney to the bladder, you will probably feel pain. The pain may be mild or very severe. You may also have some blood in your urine. As soon as the stone reaches the bladder, any intense pain should go away.  If a stone is too large to pass on its own, you may need a medical procedure to help you pass the stone.  The doctor has checked you carefully, but problems can develop later. If you notice any problems or new symptoms, get medical treatment right away.  Follow-up care is a key part of your treatment and safety. Be sure to make and go to all appointments, and call your doctor if you are having problems. It's also a good idea to know your test results and keep a list of the medicines you take.  How can you care for yourself at home?  Drink plenty of fluids. If you have kidney, heart, or liver disease and have to limit fluids, talk with your doctor before you increase the amount of fluids you drink.  Take pain medicines exactly as directed. Call your doctor if you think you are having a problem with your medicine.  If the doctor gave you a prescription medicine for pain, take it as prescribed.  If you are not taking a prescription pain medicine, ask your doctor if you can take an over-the-counter medicine. Read and follow all instructions on the label.  Your doctor may ask you to strain your urine so that you can collect your kidney stone when it passes. You can use a kitchen strainer or a tea strainer to catch the stone. Store it in a plastic bag until you see your doctor again.  Preventing future kidney stones  Some changes in your diet may help prevent kidney stones.  "Depending on the cause of your stones, your doctor may recommend that you:  Drink plenty of fluids. If you have kidney, heart, or liver disease and have to limit fluids, talk with your doctor before you increase the amount of fluids you drink.  Limit coffee, tea, and alcohol. Also avoid grapefruit juice.  Do not take more than the recommended daily dose of vitamins C and D.  Avoid antacids such as Maalox, Mylanta, or Tums.  Limit the amount of salt (sodium) in your diet.  Eat a balanced diet that is not too high in protein.  Limit foods that are high in a substance called oxalate, which can cause kidney stones. These foods include dark green vegetables, rhubarb, chocolate, wheat bran, nuts, cranberries, and beans.  When should you call for help?   Call your doctor now or seek immediate medical care if:    You cannot keep down fluids.     Your pain gets worse.     You have a fever or chills.     You have new or worse pain in your back just below your rib cage (the flank area).     You have new or more blood in your urine.   Watch closely for changes in your health, and be sure to contact your doctor if:    You do not get better as expected.   Where can you learn more?  Go to https://www.[employer].net/patiented  Enter X633 in the search box to learn more about \"Kidney Stone: Care Instructions.\"  Current as of: November 15, 2023  Content Version: 14.2 2024 [employer].   Care instructions adapted under license by your healthcare professional. If you have questions about a medical condition or this instruction, always ask your healthcare professional. [employer] disclaims any warranty or liability for your use of this information.    "

## 2024-12-12 NOTE — PLAN OF CARE
OB RN Note  Placed on EFM at 1001 until 1022  Abdomen palpated soft. Patient denies vaginal bleeding, LOF, contractions and reports +FM.  [place] quiet.  -150 (change in baseline ) moderate variability +accels. No decels.

## 2024-12-14 ENCOUNTER — PATIENT OUTREACH (OUTPATIENT)
Dept: CARE COORDINATION | Facility: CLINIC | Age: 44
End: 2024-12-14
Payer: COMMERCIAL

## 2024-12-14 NOTE — PROGRESS NOTES
Connected Care Resource Center Contact  [facility]/Voicemail     Clinical Data: Post-Discharge Outreach     Outreach attempted x 2.  Left message on patient's voicemail, providing [facility]'s central phone number of [phone] ([phone]) for questions/concerns and/or to schedule an appt with an [facility] provider, if they do not have a PCP.      Plan:  [facility] will do no further outreaches at this time.       RAYMOND Slaughter  [facility] Care Resource [place], [facility]    *Connected Care Resource Team does NOT follow patient ongoing. Referrals are identified based on internal discharge reports and the outreach is to ensure patient has an understanding of their discharge instructions.

## 2025-01-02 ENCOUNTER — MYC MEDICAL ADVICE (OUTPATIENT)
Dept: EDUCATION SERVICES | Facility: CLINIC | Age: 45
End: 2025-01-02
Payer: COMMERCIAL

## 2025-01-02 NOTE — PLAN OF CARE
Post Virtual Visit Testing - LiveWell scheduling       The provider who conducted your recent virtual visit has recommended additional testing for you.The testing is only valid for 3 days from your appointment. You can schedule your appointment to get tested in one of two ways:     Online at [email or web address] - Select the scheduling alert on the home page, or select Visits from the top navigation, then select Schedule an Appointment to get started.     With the [employer] mobile timi - Tap the My Chart button on the bottom of the home screen, then tap Appointments and Schedule an Appointment.     Thank you-     [facility]     VSS, Afebrile. Voiding without difficulty. Pt will continue to strain urine for passed kidney stone at home. Denies pain at this time. Plan to discharge to home this afternoon.     Problem: Adult Inpatient Plan of Care  Goal: Optimal Comfort and Wellbeing  Outcome: Met  Intervention: Monitor Pain and Promote Comfort  Recent Flowsheet Documentation  Taken 12/12/2024 0751 by Fannie Young RN  Pain Management Interventions: medication (see MAR)  Intervention: Provide Person-Centered Care  Recent Flowsheet Documentation  Taken 12/12/2024 0745 by Fannie Young RN  Trust Relationship/Rapport:   care explained   choices provided   emotional support provided   questions answered   questions encouraged   thoughts/feelings acknowledged     Problem: Pain Acute  Goal: Optimal Pain Control and Function  Outcome: Met  Intervention: Develop Pain Management Plan  Recent Flowsheet Documentation  Taken 12/12/2024 0751 by Fannie Young RN  Pain Management Interventions: medication (see MAR)     Problem: Adult Inpatient Plan of Care  Goal: Plan of Care Review  Outcome: Met  Flowsheets (Taken 12/12/2024 1403)  Outcome Evaluation: discharge home  Plan of Care Reviewed With:   patient   spouse  Overall Patient Progress: improving  Goal: Patient-Specific Goal (Individualized)  Outcome: Met  Goal: Absence of Hospital-Acquired Illness or Injury  Outcome: Met  Intervention: Prevent Skin Injury  Recent Flowsheet Documentation  Taken 12/12/2024 0751 by Fannie Young RN  Body Position: position changed independently  Intervention: Prevent Infection  Recent Flowsheet Documentation  Taken 12/12/2024 0745 by Fannie Young, RN  Infection Prevention:   rest/sleep promoted   hand hygiene promoted   environmental surveillance performed  Goal: Optimal Comfort and Wellbeing  Outcome: Met  Intervention: Monitor Pain and Promote Comfort  Recent Flowsheet Documentation  Taken 12/12/2024 0751 by Fannie Young RN  Pain Management  Interventions: medication (see MAR)  Intervention: Provide Person-Centered Care  Recent Flowsheet Documentation  Taken 12/12/2024 0745 by Fannie Young, RN  Trust Relationship/Rapport:   care explained   choices provided   emotional support provided   questions answered   questions encouraged   thoughts/feelings acknowledged  Goal: Readiness for Transition of Care  Outcome: Met       Goal Outcome Evaluation:      Plan of Care Reviewed With: patient, spouse    Overall Patient Progress: improvingOverall Patient Progress: improving    Outcome Evaluation: discharge home

## 2025-01-02 NOTE — TELEPHONE ENCOUNTER
Diabetes Education Follow-Up     Subjective/Objective:     Anahi sent in blood glucose log for review. Last date of communication was: 24.     Diabetes is being managed with Lifestyle (diet/activity)     Estimate Due Date: 25 (32 1/ weeks)    BG/Food Lo post breakfast 25    Assessment:     Fasting blood glucose: 57% in target.  After breakfast glucose: 100% in target.  Before lunch glucose: -% in target.  After lunch glucose: 100% in target.  Before dinner glucose: -% in target.  After dinner glucose: 83% in target.  Bedtime glucose: -% in target.     Plan/Response:  No changes in the patient's current treatment plan  Increase protein at bedtime snack  Follow-up 1 week     Eusebia Orozco, LYNNE, ENION, LD, [facility]  Diabetes     [phone]  Diabetes Education Care Team: [phone]     Any diabetes medication dose changes were made via the CDE Protocol and Collaborative Practice Agreement with the patient's referring provider. A copy of this encounter was shared with the provider.

## 2025-01-03 ENCOUNTER — HOSPITAL ENCOUNTER (OUTPATIENT)
Dept: ULTRASOUND IMAGING | Facility: CLINIC | Age: 45
Discharge: HOME | End: 2025-01-03
Attending: STUDENT IN AN ORGANIZED HEALTH CARE EDUCATION/TRAINING PROGRAM
Payer: COMMERCIAL

## 2025-01-03 DIAGNOSIS — O24.410 DIET CONTROLLED GESTATIONAL DIABETES MELLITUS (GDM) IN THIRD TRIMESTER: ICD-10-CM

## 2025-01-03 DIAGNOSIS — O09.899 TWO VESSEL UMBILICAL CORD, ANTEPARTUM: ICD-10-CM

## 2025-01-03 PROCEDURE — 76816 OB US FOLLOW-UP PER FETUS: CPT

## 2025-01-14 ENCOUNTER — MYC MEDICAL ADVICE (OUTPATIENT)
Dept: EDUCATION SERVICES | Facility: CLINIC | Age: 45
End: 2025-01-14
Payer: COMMERCIAL

## 2025-01-14 NOTE — TELEPHONE ENCOUNTER
Diabetes Education Follow-Up     Subjective/Objective:     Anahi sent in blood glucose log for review. Last date of communication was: 1/10/25.     Diabetes is being managed with Lifestyle (diet/activity)     Estimate Due Date: 25 (33 6/7 weeks) -->  scheduled 25    BG/Food Log:        Assessment:     Fasting blood glucose: 100% in target.  After breakfast glucose: 83% in target.  Before lunch glucose: -% in target.  After lunch glucose: 100% in target.  Before dinner glucose: -% in target.  After dinner glucose: 100% in target.  Bedtime glucose: -% in target.     Plan/Response:  No changes in the patient's current treatment plan     Eusebia Orozco, LYNNE, ENION, LD, [facility]  Diabetes     [phone]  Diabetes Education Care Team: [phone]     Any diabetes medication dose changes were made via the CDE Protocol and Collaborative Practice Agreement with the patient's referring provider. A copy of this encounter was shared with the provider.

## 2025-01-22 ENCOUNTER — PRENATAL OFFICE VISIT (OUTPATIENT)
Dept: OBGYN | Facility: CLINIC | Age: 45
End: 2025-01-22
Payer: COMMERCIAL

## 2025-01-22 VITALS — WEIGHT: 205.9 LBS | DIASTOLIC BLOOD PRESSURE: 88 MMHG | BODY MASS INDEX: 30.41 KG/M2 | SYSTOLIC BLOOD PRESSURE: 122 MMHG

## 2025-01-22 DIAGNOSIS — O09.813 PREGNANCY RESULTING FROM IN VITRO FERTILIZATION IN THIRD TRIMESTER: ICD-10-CM

## 2025-01-22 DIAGNOSIS — E03.9 HYPOTHYROIDISM AFFECTING PREGNANCY IN THIRD TRIMESTER: ICD-10-CM

## 2025-01-22 DIAGNOSIS — O99.019 ANEMIA AFFECTING FIRST PREGNANCY: ICD-10-CM

## 2025-01-22 DIAGNOSIS — O22.30 DVT (DEEP VEIN THROMBOSIS) IN PREGNANCY: ICD-10-CM

## 2025-01-22 DIAGNOSIS — O24.410 WHITE CLASSIFICATION A1 GESTATIONAL DIABETES MELLITUS (GDM), DIET CONTROLLED: ICD-10-CM

## 2025-01-22 DIAGNOSIS — O09.512 PRIMIGRAVIDA OF ADVANCED MATERNAL AGE IN SECOND TRIMESTER: ICD-10-CM

## 2025-01-22 DIAGNOSIS — O34.29 PREGNANCY WITH HISTORY OF UTERINE MYOMECTOMY: Primary | ICD-10-CM

## 2025-01-22 DIAGNOSIS — O09.899 TWO VESSEL UMBILICAL CORD, ANTEPARTUM: ICD-10-CM

## 2025-01-22 DIAGNOSIS — O99.283 HYPOTHYROIDISM AFFECTING PREGNANCY IN THIRD TRIMESTER: ICD-10-CM

## 2025-01-22 DIAGNOSIS — O43.199 MARGINAL INSERTION OF UMBILICAL CORD AFFECTING MANAGEMENT OF MOTHER: ICD-10-CM

## 2025-01-22 PROCEDURE — 87653 STREP B DNA AMP PROBE: CPT | Performed by: OBSTETRICS & GYNECOLOGY

## 2025-01-22 PROCEDURE — 99207 PR COMPLICATED OB VISIT: CPT | Performed by: OBSTETRICS & GYNECOLOGY

## 2025-01-22 RX ORDER — BLOOD-GLUCOSE METER
EACH MISCELLANEOUS
COMMUNITY
Start: 2024-12-02

## 2025-01-22 NOTE — PROGRESS NOTES
No c/o's. On Lovenox, baby ASA. Preop done. ORAS done. Stop Lovenox and baby ASA 24 hours before C/S. GBS done. FBS-70-90, 1hr PP-100-120. RC/S 2025. Fetal movement counts BID,  labor/premature rupture of membranes precautions reviewed.    Encounter Diagnoses   Name Primary?    Pregnancy with history of uterine myomectomy Yes    White classification A1 gestational diabetes mellitus (GDM), diet controlled     DVT (deep vein thrombosis) in pregnancy     Two vessel umbilical cord, antepartum     Marginal insertion of umbilical cord affecting management of mother     Primigravida of advanced maternal age in second trimester     Pregnancy resulting from in vitro fertilization in third trimester     Hypothyroidism affecting pregnancy in third trimester     Anemia affecting first pregnancy        Risk factors listed above are stable and being addressed as noted.    Dann Yee MD  [facility] [place]

## 2025-01-22 NOTE — NURSING NOTE
"Chief Complaint   Patient presents with    Prenatal Care     35 weeks 0 days   C/S 25   GBS= Due        Initial /88 (BP Location: Right arm, Cuff Size: Adult Regular)   Wt 93.4 kg (205 lb 14.4 oz)   LMP  (LMP Unknown)   BMI 30.41 kg/m   Estimated body mass index is 30.41 kg/m  as calculated from the following:    Height as of 24: 1.753 m (5' 9\").    Weight as of this encounter: 93.4 kg (205 lb 14.4 oz).  BP completed using cuff size: regular    Questioned patient about current smoking habits.  Pt. has never smoked.    35w0d         The following  Due: NONE        +FM daily   GBS= due         Aline Solano, [facility] on 2025 at 11:02 AM               "

## 2025-01-22 NOTE — H&P
[facility]    History and Physical  Obstetrics and Gynecology     Date of Admission:  (Not on file)    Assessment & Plan   Anahi Hannah is a 44 year old female who is scheduled for Primary  section.     ASSESSMENT:   1) IUP @ 35w0d today    2) Hx Myomectomy - for C/S at 36+ wks    3) Hx DVT during preg - on Lovenox and baby ASA, both D/C'd 24 hours prior to C/S 2025    4) Hypothyroidism - subclinical, on Synthroid due to need for IVF    5) GDM A1 - BS'd well managed on diet only    6)  Marginal CI and 2VC - MFM U/S 32w 65%    7)  Anemia - improved after IV Fe    8)  IVF Preg and AMA - preconceptual genetics WNL, MsAFP WNL, Lvl 2 U/S 2VC and Natali CI      PLAN:   1) Scheduled for  section at 36w2d on 2025.    2) Pt will take last dose of Lovenox and baby ASA the evening of 2025.  Will restart Lovenox 24 hour postpartum and continue for 6 weeks.    3) Will stop Synthroid PP and get TSH drawn at 6 week PP check.    4) The risks, benefits, complications, treatment options, non-operative alternatives were discussed with the patient. Risks include but are not limited to infection, bleeding possibly requiring blood transfusion, injury to surrounding organs such as uterus/ovaries/bladder/bowel/nerves/ blood vessels with possible need for further surgery/procedure, injury to baby, VTE/PE, and remote risk of maternal/fetal death. The patient concurred with the proposed plan, giving informed consent.     All questions were answered.     Dann Yee MD    History of Present Illness   Anahi Hannah is a 44 year old female  35w0d planning for Primary  section.  2025    PRENATAL COURSE  Prenatal course was complicated by    Patient Active Problem List    Diagnosis Date Noted    Other hydronephrosis 12/10/2024     Priority: Medium    Anemia affecting first pregnancy 12/10/2024     Priority: Medium    Hydronephrosis 12/10/2024     Priority:  Medium    White classification A1 gestational diabetes mellitus (GDM), diet controlled 11/27/2024     Priority: Medium    Two vessel umbilical cord, antepartum 10/02/2024     Priority: Medium    Marginal insertion of umbilical cord affecting management of mother 10/02/2024     Priority: Medium    Family history of hypercoagulable state 09/27/2024     Priority: Medium    Factor 5 Leiden mutation, heterozygous 07/29/2024     Priority: Medium    Hypothyroidism affecting pregnancy 07/29/2024     Priority: Medium    Pregnancy with history of uterine myomectomy 07/22/2024     Priority: Medium    DVT (deep vein thrombosis) in pregnancy 07/22/2024     Priority: Medium    Pregnancy resulting from in vitro fertilization 07/22/2024     Priority: Medium    Advanced maternal age, 1st pregnancy 07/22/2024     Priority: Medium    DVT (deep venous thrombosis) (H) 07/11/2024     Priority: Medium    High-tone pelvic floor dysfunction in female 08/02/2023     Priority: Medium    Pelvic somatic dysfunction 08/02/2023     Priority: Medium    Dyspareunia in female 08/02/2023     Priority: Medium    History of colposcopy with cervical biopsy      Priority: Medium     2013? Abnormal pap with colp and cryo?  2018 NIL pap, neg HPV  2019 NIL pap, neg HPV  6/12/23 NIL pap, neg HPV. Plan cotest in 5 years.      Female infertility 02/07/2022     Priority: Medium    Uterine leiomyoma, unspecified location 02/07/2022     Priority: Medium    Gastroesophageal reflux disease without esophagitis      Priority: Medium    Kidney stone 07/11/2017     Priority: Medium    Pap smear abnormality of cervix 07/21/2014     Priority: Medium    Anxiety 07/24/2013     Priority: Medium    Migraine headache 01/15/1985     Priority: Medium         Recent Labs   Lab Test 12/10/24  1959   AS Negative     Rhogam not indicated   Recent Labs   Lab Test 07/23/24  1418   HEPBANG Nonreactive   HIAGAB Nonreactive   RUQIGG Positive         Prior to Admission Medications    Cannot display prior to admission medications because the patient has not been admitted in this contact.     Allergies   Allergies   Allergen Reactions    Fluconazole Hives    Hydromorphone Other (See Comments)     Other reaction(s): Hallucinations    Auditory Hallucinations    Onion GI Disturbance, Itching, Palpitations, Rash, Shortness Of Breath and Nausea    Egg Yolk Diarrhea    Pcn [Penicillins] Hives     PEN-FAST - Penicillin Allergy Risk Tool completed by LT[facility], located within [facility] December 11, 2024    Score: 0  Risk: Very Low  Assessment: Patient has a < 1% chance of a positive penicillin allergy test       Amoxicillin Hives    Bacitracin-Polymyxin B Hives    Cephalosporins Other (See Comments)     Pt thinks she might be  allergic to cephalosporins- but not certain?    Hydrocortisone Unknown     Other reaction(s): Edema    Neomycin-Bacitracin Zn-Polymyx     Polymyxin B Sulfate Hives    Terconazole     Garlic GI Disturbance, Itching, Rash and Nausea    Neomycin Sulfate Anxiety, Hives, Itching, Palpitations and Rash    Rosemary Oil Anxiety, GI Disturbance, Hives, Itching, Rash and Nausea         Immunization History   Immunization History   Administered Date(s) Administered    COVID-19 12+ (Pfizer) 10/23/2023    COVID-19 Bivalent 18+ (Moderna) 11/09/2022    COVID-19 Monovalent 18+ (Moderna) 01/14/2021, 02/11/2021, 11/12/2021    DTAP (<7y) 1978, 02/16/1979, 05/01/1979, 05/01/1980, 04/26/1984    Flu, Unspecified 12/02/2018    HIB, Unspecified 02/04/1997, 03/04/1997    Hepatitis B, Peds 02/04/1997, 03/04/1997, 08/05/1997    Influenza Vaccine Trivalent (FluBlok) 10/14/2024    MMR 08/25/1980, 01/28/1992    Mantoux Tuberculin Skin Test 04/27/1981, 09/05/2019    OPV, trivalent, live 1978, 02/16/1979, 05/01/1979, 05/01/1980    RSV Vaccine (Abrysvo) 01/03/2025    TDAP (Adacel,Boostrix) 12/06/2024    TDAP Vaccine (Adacel) 09/05/2019    Td (Adult), Adsorbed 03/11/1994    Varicella 11/10/1985       Past Medical History:   Diagnosis  Date    Crushing injury of ankle and foot     DVT (deep vein thrombosis) in pregnancy 07/22/2024    DVT (deep venous thrombosis) (H) 07/10/2024    Early menopause 2021    Factor 5 Leiden mutation, heterozygous 07/29/2024    Fibroid uterus 02/2023    large fibroid removed at [place]    Gastroesophageal reflux disease without esophagitis     Gestational diabetes     History of colposcopy with cervical biopsy     pos high risk HPV    Pelvic floor dysfunction     Pneumonia of right lower lobe due to infectious organism     Tension headache        Past Surgical History:   Procedure Laterality Date    GYNECOLOGIC CRYOSURGERY  2011    for HPV    HIP SURGERY      Congenital hip dysplasia    HYSTEROSCOPY,DIAGNOSTIC  02/20/2023    At [place] - done just prior to Myomectomy    LAPAROSCOPIC MYOMECTOMY UTERUS  02/20/2023    At [place] - 10 cm myoma    WISDOM TOOTH EXTRACTION         Clinic vitals from today: LMP  (LMP Unknown)     Abdomen: gravid, single vertex fetus, non-tender, EFW 6 lbs   Constitutional: healthy, alert, active and no distress   Extremities: NT, no edema  Neurologic: Awake, alert, oriented x3  Neuropsychiatric: General: normal, calm and normal eye contact  Heart: Regular rate and rhythm  Lungs: clear to ausculation bilaterally    Dann Yee MD

## 2025-01-23 LAB — GP B STREP DNA SPEC QL NAA+PROBE: NEGATIVE

## 2025-01-31 ENCOUNTER — HOSPITAL ENCOUNTER (INPATIENT)
Facility: CLINIC | Age: 45
LOS: 3 days | Discharge: HOME HEALTH | End: 2025-02-03
Attending: OBSTETRICS & GYNECOLOGY | Admitting: OBSTETRICS & GYNECOLOGY
Payer: COMMERCIAL

## 2025-01-31 DIAGNOSIS — Z87.59 HISTORY OF DEEP VEIN THROMBOSIS (DVT) DURING PREGNANCY: ICD-10-CM

## 2025-01-31 DIAGNOSIS — E03.9 HYPOTHYROIDISM AFFECTING PREGNANCY IN THIRD TRIMESTER: ICD-10-CM

## 2025-01-31 DIAGNOSIS — Z86.718 HISTORY OF DEEP VEIN THROMBOSIS (DVT) DURING PREGNANCY: ICD-10-CM

## 2025-01-31 DIAGNOSIS — O09.813 PREGNANCY RESULTING FROM IN VITRO FERTILIZATION IN THIRD TRIMESTER: ICD-10-CM

## 2025-01-31 DIAGNOSIS — O34.29 PREGNANCY WITH HISTORY OF UTERINE MYOMECTOMY: ICD-10-CM

## 2025-01-31 DIAGNOSIS — O99.019 ANEMIA AFFECTING FIRST PREGNANCY: ICD-10-CM

## 2025-01-31 DIAGNOSIS — O09.513 PRIMIGRAVIDA OF ADVANCED MATERNAL AGE IN THIRD TRIMESTER: ICD-10-CM

## 2025-01-31 DIAGNOSIS — O43.219 PLACENTA ACCRETA AFFECTING DELIVERY: ICD-10-CM

## 2025-01-31 DIAGNOSIS — O43.199 MARGINAL INSERTION OF UMBILICAL CORD AFFECTING MANAGEMENT OF MOTHER: ICD-10-CM

## 2025-01-31 DIAGNOSIS — O99.283 HYPOTHYROIDISM AFFECTING PREGNANCY IN THIRD TRIMESTER: ICD-10-CM

## 2025-01-31 DIAGNOSIS — O24.410 WHITE CLASSIFICATION A1 GESTATIONAL DIABETES MELLITUS (GDM), DIET CONTROLLED: ICD-10-CM

## 2025-01-31 DIAGNOSIS — O22.30 DVT (DEEP VEIN THROMBOSIS) IN PREGNANCY: ICD-10-CM

## 2025-01-31 DIAGNOSIS — O09.899 TWO VESSEL UMBILICAL CORD, ANTEPARTUM: ICD-10-CM

## 2025-01-31 LAB
ABO + RH BLD: NORMAL
BLD GP AB SCN SERPL QL: NEGATIVE
CREAT SERPL-MCNC: 0.58 MG/DL (ref 0.51–0.95)
CREAT SERPL-MCNC: 0.61 MG/DL (ref 0.51–0.95)
EGFRCR SERPLBLD CKD-EPI 2021: >90 ML/MIN/1.73M2
EGFRCR SERPLBLD CKD-EPI 2021: >90 ML/MIN/1.73M2
GLUCOSE BLDC GLUCOMTR-MCNC: 95 MG/DL (ref 70–99)
HGB BLD-MCNC: 10 G/DL (ref 11.7–15.7)
PLATELET # BLD AUTO: 325 10E3/UL (ref 150–450)
SPECIMEN EXP DATE BLD: NORMAL
T PALLIDUM AB SER QL: NONREACTIVE

## 2025-01-31 PROCEDURE — 370N000017 HC ANESTHESIA TECHNICAL FEE, PER MIN: Performed by: OBSTETRICS & GYNECOLOGY

## 2025-01-31 PROCEDURE — 258N000003 HC RX IP 258 OP 636: Performed by: ANESTHESIOLOGY

## 2025-01-31 PROCEDURE — 120N000001 HC R&B MED SURG/OB

## 2025-01-31 PROCEDURE — 258N000003 HC RX IP 258 OP 636: Performed by: OBSTETRICS & GYNECOLOGY

## 2025-01-31 PROCEDURE — 85018 HEMOGLOBIN: CPT | Performed by: OBSTETRICS & GYNECOLOGY

## 2025-01-31 PROCEDURE — 710N000009 HC RECOVERY PHASE 1, LEVEL 1, PER MIN: Performed by: OBSTETRICS & GYNECOLOGY

## 2025-01-31 PROCEDURE — 250N000011 HC RX IP 250 OP 636: Performed by: OBSTETRICS & GYNECOLOGY

## 2025-01-31 PROCEDURE — 360N000076 HC SURGERY LEVEL 3, PER MIN: Performed by: OBSTETRICS & GYNECOLOGY

## 2025-01-31 PROCEDURE — 86850 RBC ANTIBODY SCREEN: CPT | Performed by: OBSTETRICS & GYNECOLOGY

## 2025-01-31 PROCEDURE — 36415 COLL VENOUS BLD VENIPUNCTURE: CPT | Performed by: OBSTETRICS & GYNECOLOGY

## 2025-01-31 PROCEDURE — 250N000011 HC RX IP 250 OP 636: Performed by: ANESTHESIOLOGY

## 2025-01-31 PROCEDURE — 250N000013 HC RX MED GY IP 250 OP 250 PS 637: Performed by: OBSTETRICS & GYNECOLOGY

## 2025-01-31 PROCEDURE — 85049 AUTOMATED PLATELET COUNT: CPT | Performed by: OBSTETRICS & GYNECOLOGY

## 2025-01-31 PROCEDURE — 86900 BLOOD TYPING SEROLOGIC ABO: CPT | Performed by: OBSTETRICS & GYNECOLOGY

## 2025-01-31 PROCEDURE — 59160 D & C AFTER DELIVERY: CPT | Performed by: OBSTETRICS & GYNECOLOGY

## 2025-01-31 PROCEDURE — 250N000013 HC RX MED GY IP 250 OP 250 PS 637: Performed by: FAMILY MEDICINE

## 2025-01-31 PROCEDURE — 86780 TREPONEMA PALLIDUM: CPT | Performed by: OBSTETRICS & GYNECOLOGY

## 2025-01-31 PROCEDURE — 82565 ASSAY OF CREATININE: CPT | Performed by: OBSTETRICS & GYNECOLOGY

## 2025-01-31 PROCEDURE — 59510 CESAREAN DELIVERY: CPT | Performed by: OBSTETRICS & GYNECOLOGY

## 2025-01-31 PROCEDURE — 88307 TISSUE EXAM BY PATHOLOGIST: CPT | Mod: TC | Performed by: OBSTETRICS & GYNECOLOGY

## 2025-01-31 PROCEDURE — 272N000001 HC OR GENERAL SUPPLY STERILE: Performed by: OBSTETRICS & GYNECOLOGY

## 2025-01-31 RX ORDER — ONDANSETRON 4 MG/1
4 TABLET, ORALLY DISINTEGRATING ORAL EVERY 6 HOURS PRN
Status: DISCONTINUED | OUTPATIENT
Start: 2025-01-31 | End: 2025-02-03 | Stop reason: HOSPADM

## 2025-01-31 RX ORDER — NALOXONE HYDROCHLORIDE 0.4 MG/ML
0.2 INJECTION, SOLUTION INTRAMUSCULAR; INTRAVENOUS; SUBCUTANEOUS
Status: DISCONTINUED | OUTPATIENT
Start: 2025-01-31 | End: 2025-02-03 | Stop reason: HOSPADM

## 2025-01-31 RX ORDER — ONDANSETRON 2 MG/ML
4 INJECTION INTRAMUSCULAR; INTRAVENOUS EVERY 6 HOURS PRN
Status: DISCONTINUED | OUTPATIENT
Start: 2025-01-31 | End: 2025-01-31

## 2025-01-31 RX ORDER — ACETAMINOPHEN 325 MG/1
975 TABLET ORAL ONCE
Status: COMPLETED | OUTPATIENT
Start: 2025-01-31 | End: 2025-01-31

## 2025-01-31 RX ORDER — METOCLOPRAMIDE HYDROCHLORIDE 5 MG/ML
10 INJECTION INTRAMUSCULAR; INTRAVENOUS EVERY 6 HOURS PRN
Status: DISCONTINUED | OUTPATIENT
Start: 2025-01-31 | End: 2025-02-03 | Stop reason: HOSPADM

## 2025-01-31 RX ORDER — OXYTOCIN/0.9 % SODIUM CHLORIDE 30/500 ML
100-340 PLASTIC BAG, INJECTION (ML) INTRAVENOUS CONTINUOUS PRN
Status: DISCONTINUED | OUTPATIENT
Start: 2025-01-31 | End: 2025-01-31

## 2025-01-31 RX ORDER — LOPERAMIDE HYDROCHLORIDE 2 MG/1
4 CAPSULE ORAL
Status: DISCONTINUED | OUTPATIENT
Start: 2025-01-31 | End: 2025-02-03 | Stop reason: HOSPADM

## 2025-01-31 RX ORDER — DEXTROSE MONOHYDRATE 25 G/50ML
25-50 INJECTION, SOLUTION INTRAVENOUS
Status: DISCONTINUED | OUTPATIENT
Start: 2025-01-31 | End: 2025-02-03 | Stop reason: HOSPADM

## 2025-01-31 RX ORDER — MISOPROSTOL 200 UG/1
800 TABLET ORAL
Status: DISCONTINUED | OUTPATIENT
Start: 2025-01-31 | End: 2025-02-03 | Stop reason: HOSPADM

## 2025-01-31 RX ORDER — MODIFIED LANOLIN
OINTMENT (GRAM) TOPICAL
Status: DISCONTINUED | OUTPATIENT
Start: 2025-01-31 | End: 2025-02-03 | Stop reason: HOSPADM

## 2025-01-31 RX ORDER — MISOPROSTOL 200 UG/1
800 TABLET ORAL
Status: DISCONTINUED | OUTPATIENT
Start: 2025-01-31 | End: 2025-01-31 | Stop reason: HOSPADM

## 2025-01-31 RX ORDER — OXYTOCIN 10 [USP'U]/ML
10 INJECTION, SOLUTION INTRAMUSCULAR; INTRAVENOUS
Status: DISCONTINUED | OUTPATIENT
Start: 2025-01-31 | End: 2025-01-31

## 2025-01-31 RX ORDER — CEFAZOLIN SODIUM/WATER 2 G/20 ML
2 SYRINGE (ML) INTRAVENOUS SEE ADMIN INSTRUCTIONS
Status: DISCONTINUED | OUTPATIENT
Start: 2025-01-31 | End: 2025-01-31 | Stop reason: HOSPADM

## 2025-01-31 RX ORDER — OXYTOCIN 10 [USP'U]/ML
10 INJECTION, SOLUTION INTRAMUSCULAR; INTRAVENOUS
Status: DISCONTINUED | OUTPATIENT
Start: 2025-01-31 | End: 2025-02-03 | Stop reason: HOSPADM

## 2025-01-31 RX ORDER — METHYLERGONOVINE MALEATE 0.2 MG/ML
200 INJECTION INTRAVENOUS
Status: DISCONTINUED | OUTPATIENT
Start: 2025-01-31 | End: 2025-02-03 | Stop reason: HOSPADM

## 2025-01-31 RX ORDER — LEVOTHYROXINE SODIUM 50 UG/1
50 TABLET ORAL DAILY
Status: DISCONTINUED | OUTPATIENT
Start: 2025-02-01 | End: 2025-02-03 | Stop reason: HOSPADM

## 2025-01-31 RX ORDER — LIDOCAINE 40 MG/G
CREAM TOPICAL
Status: DISCONTINUED | OUTPATIENT
Start: 2025-01-31 | End: 2025-01-31 | Stop reason: HOSPADM

## 2025-01-31 RX ORDER — NICOTINE POLACRILEX 4 MG
15-30 LOZENGE BUCCAL
Status: DISCONTINUED | OUTPATIENT
Start: 2025-01-31 | End: 2025-02-03 | Stop reason: HOSPADM

## 2025-01-31 RX ORDER — METOCLOPRAMIDE 10 MG/1
10 TABLET ORAL EVERY 6 HOURS PRN
Status: DISCONTINUED | OUTPATIENT
Start: 2025-01-31 | End: 2025-02-03 | Stop reason: HOSPADM

## 2025-01-31 RX ORDER — IBUPROFEN 600 MG/1
600 TABLET, FILM COATED ORAL EVERY 6 HOURS
Status: DISCONTINUED | OUTPATIENT
Start: 2025-02-01 | End: 2025-02-03 | Stop reason: HOSPADM

## 2025-01-31 RX ORDER — NALOXONE HYDROCHLORIDE 0.4 MG/ML
0.4 INJECTION, SOLUTION INTRAMUSCULAR; INTRAVENOUS; SUBCUTANEOUS
Status: DISCONTINUED | OUTPATIENT
Start: 2025-01-31 | End: 2025-02-03 | Stop reason: HOSPADM

## 2025-01-31 RX ORDER — OXYTOCIN/0.9 % SODIUM CHLORIDE 30/500 ML
340 PLASTIC BAG, INJECTION (ML) INTRAVENOUS CONTINUOUS PRN
Status: DISCONTINUED | OUTPATIENT
Start: 2025-01-31 | End: 2025-02-03 | Stop reason: HOSPADM

## 2025-01-31 RX ORDER — MORPHINE SULFATE 0.5 MG/ML
150 INJECTION, SOLUTION EPIDURAL; INTRATHECAL; INTRAVENOUS ONCE
Status: DISCONTINUED | OUTPATIENT
Start: 2025-01-31 | End: 2025-01-31

## 2025-01-31 RX ORDER — CARBOPROST TROMETHAMINE 250 UG/ML
250 INJECTION, SOLUTION INTRAMUSCULAR
Status: DISCONTINUED | OUTPATIENT
Start: 2025-01-31 | End: 2025-01-31 | Stop reason: HOSPADM

## 2025-01-31 RX ORDER — METOCLOPRAMIDE 10 MG/1
10 TABLET ORAL EVERY 6 HOURS PRN
Status: DISCONTINUED | OUTPATIENT
Start: 2025-01-31 | End: 2025-01-31

## 2025-01-31 RX ORDER — METOCLOPRAMIDE HYDROCHLORIDE 5 MG/ML
10 INJECTION INTRAMUSCULAR; INTRAVENOUS EVERY 6 HOURS PRN
Status: DISCONTINUED | OUTPATIENT
Start: 2025-01-31 | End: 2025-01-31

## 2025-01-31 RX ORDER — MISOPROSTOL 200 UG/1
400 TABLET ORAL
Status: DISCONTINUED | OUTPATIENT
Start: 2025-01-31 | End: 2025-02-03 | Stop reason: HOSPADM

## 2025-01-31 RX ORDER — LIDOCAINE 40 MG/G
CREAM TOPICAL
Status: DISCONTINUED | OUTPATIENT
Start: 2025-01-31 | End: 2025-02-03 | Stop reason: HOSPADM

## 2025-01-31 RX ORDER — TRANEXAMIC ACID 10 MG/ML
1 INJECTION, SOLUTION INTRAVENOUS EVERY 30 MIN PRN
Status: DISCONTINUED | OUTPATIENT
Start: 2025-01-31 | End: 2025-02-03 | Stop reason: HOSPADM

## 2025-01-31 RX ORDER — SODIUM CHLORIDE, SODIUM LACTATE, POTASSIUM CHLORIDE, CALCIUM CHLORIDE 600; 310; 30; 20 MG/100ML; MG/100ML; MG/100ML; MG/100ML
INJECTION, SOLUTION INTRAVENOUS CONTINUOUS
Status: DISCONTINUED | OUTPATIENT
Start: 2025-01-31 | End: 2025-01-31 | Stop reason: HOSPADM

## 2025-01-31 RX ORDER — OXYTOCIN/0.9 % SODIUM CHLORIDE 30/500 ML
340 PLASTIC BAG, INJECTION (ML) INTRAVENOUS CONTINUOUS PRN
Status: DISCONTINUED | OUTPATIENT
Start: 2025-01-31 | End: 2025-01-31 | Stop reason: HOSPADM

## 2025-01-31 RX ORDER — TRANEXAMIC ACID 10 MG/ML
1 INJECTION, SOLUTION INTRAVENOUS EVERY 30 MIN PRN
Status: DISCONTINUED | OUTPATIENT
Start: 2025-01-31 | End: 2025-01-31 | Stop reason: HOSPADM

## 2025-01-31 RX ORDER — PANTOPRAZOLE SODIUM 40 MG/1
40 TABLET, DELAYED RELEASE ORAL DAILY
Status: DISCONTINUED | OUTPATIENT
Start: 2025-01-31 | End: 2025-02-03 | Stop reason: HOSPADM

## 2025-01-31 RX ORDER — METHYLERGONOVINE MALEATE 0.2 MG/ML
200 INJECTION INTRAVENOUS
Status: DISCONTINUED | OUTPATIENT
Start: 2025-01-31 | End: 2025-01-31 | Stop reason: HOSPADM

## 2025-01-31 RX ORDER — AMOXICILLIN 250 MG
1 CAPSULE ORAL 2 TIMES DAILY
Status: DISCONTINUED | OUTPATIENT
Start: 2025-01-31 | End: 2025-02-03 | Stop reason: HOSPADM

## 2025-01-31 RX ORDER — ENOXAPARIN SODIUM 100 MG/ML
40 INJECTION SUBCUTANEOUS EVERY 24 HOURS
Status: DISCONTINUED | OUTPATIENT
Start: 2025-02-01 | End: 2025-02-03 | Stop reason: HOSPADM

## 2025-01-31 RX ORDER — ONDANSETRON 4 MG/1
4 TABLET, ORALLY DISINTEGRATING ORAL EVERY 6 HOURS PRN
Status: DISCONTINUED | OUTPATIENT
Start: 2025-01-31 | End: 2025-01-31

## 2025-01-31 RX ORDER — DEXTROSE, SODIUM CHLORIDE, SODIUM LACTATE, POTASSIUM CHLORIDE, AND CALCIUM CHLORIDE 5; .6; .31; .03; .02 G/100ML; G/100ML; G/100ML; G/100ML; G/100ML
INJECTION, SOLUTION INTRAVENOUS CONTINUOUS
Status: DISCONTINUED | OUTPATIENT
Start: 2025-01-31 | End: 2025-02-03 | Stop reason: HOSPADM

## 2025-01-31 RX ORDER — FENTANYL CITRATE-0.9 % NACL/PF 10 MCG/ML
100 PLASTIC BAG, INJECTION (ML) INTRAVENOUS EVERY 5 MIN PRN
Status: COMPLETED | OUTPATIENT
Start: 2025-01-31 | End: 2025-01-31

## 2025-01-31 RX ORDER — NALBUPHINE HYDROCHLORIDE 10 MG/ML
2.5-5 INJECTION INTRAMUSCULAR; INTRAVENOUS; SUBCUTANEOUS EVERY 6 HOURS PRN
Status: DISCONTINUED | OUTPATIENT
Start: 2025-01-31 | End: 2025-01-31

## 2025-01-31 RX ORDER — LOPERAMIDE HYDROCHLORIDE 2 MG/1
2 CAPSULE ORAL
Status: DISCONTINUED | OUTPATIENT
Start: 2025-01-31 | End: 2025-01-31 | Stop reason: HOSPADM

## 2025-01-31 RX ORDER — CEFAZOLIN SODIUM/WATER 2 G/20 ML
2 SYRINGE (ML) INTRAVENOUS
Status: DISCONTINUED | OUTPATIENT
Start: 2025-01-31 | End: 2025-01-31 | Stop reason: HOSPADM

## 2025-01-31 RX ORDER — LOPERAMIDE HYDROCHLORIDE 2 MG/1
4 CAPSULE ORAL
Status: DISCONTINUED | OUTPATIENT
Start: 2025-01-31 | End: 2025-01-31 | Stop reason: HOSPADM

## 2025-01-31 RX ORDER — LOPERAMIDE HYDROCHLORIDE 2 MG/1
2 CAPSULE ORAL
Status: DISCONTINUED | OUTPATIENT
Start: 2025-01-31 | End: 2025-02-03 | Stop reason: HOSPADM

## 2025-01-31 RX ORDER — OXYCODONE HYDROCHLORIDE 5 MG/1
5 TABLET ORAL EVERY 4 HOURS PRN
Status: DISCONTINUED | OUTPATIENT
Start: 2025-01-31 | End: 2025-02-03 | Stop reason: HOSPADM

## 2025-01-31 RX ORDER — MISOPROSTOL 200 UG/1
400 TABLET ORAL
Status: DISCONTINUED | OUTPATIENT
Start: 2025-01-31 | End: 2025-01-31 | Stop reason: HOSPADM

## 2025-01-31 RX ORDER — BISACODYL 10 MG
10 SUPPOSITORY, RECTAL RECTAL DAILY PRN
Status: DISCONTINUED | OUTPATIENT
Start: 2025-02-02 | End: 2025-02-03 | Stop reason: HOSPADM

## 2025-01-31 RX ORDER — PROCHLORPERAZINE MALEATE 10 MG
10 TABLET ORAL EVERY 6 HOURS PRN
Status: DISCONTINUED | OUTPATIENT
Start: 2025-01-31 | End: 2025-01-31

## 2025-01-31 RX ORDER — PROCHLORPERAZINE MALEATE 10 MG
10 TABLET ORAL EVERY 6 HOURS PRN
Status: DISCONTINUED | OUTPATIENT
Start: 2025-01-31 | End: 2025-02-03 | Stop reason: HOSPADM

## 2025-01-31 RX ORDER — CALCIUM CARBONATE 500 MG/1
500 TABLET, CHEWABLE ORAL DAILY PRN
Status: DISCONTINUED | OUTPATIENT
Start: 2025-01-31 | End: 2025-02-03 | Stop reason: HOSPADM

## 2025-01-31 RX ORDER — SODIUM PHOSPHATE,MONO-DIBASIC 19G-7G/118
1 ENEMA (ML) RECTAL DAILY PRN
Status: DISCONTINUED | OUTPATIENT
Start: 2025-02-02 | End: 2025-02-03 | Stop reason: HOSPADM

## 2025-01-31 RX ORDER — CARBOPROST TROMETHAMINE 250 UG/ML
250 INJECTION, SOLUTION INTRAMUSCULAR
Status: DISCONTINUED | OUTPATIENT
Start: 2025-01-31 | End: 2025-02-03 | Stop reason: HOSPADM

## 2025-01-31 RX ORDER — ONDANSETRON 2 MG/ML
4 INJECTION INTRAMUSCULAR; INTRAVENOUS EVERY 6 HOURS PRN
Status: DISCONTINUED | OUTPATIENT
Start: 2025-01-31 | End: 2025-02-03 | Stop reason: HOSPADM

## 2025-01-31 RX ORDER — CITRIC ACID/SODIUM CITRATE 334-500MG
30 SOLUTION, ORAL ORAL
Status: COMPLETED | OUTPATIENT
Start: 2025-01-31 | End: 2025-01-31

## 2025-01-31 RX ORDER — KETOROLAC TROMETHAMINE 15 MG/ML
15 INJECTION, SOLUTION INTRAMUSCULAR; INTRAVENOUS EVERY 6 HOURS
Status: COMPLETED | OUTPATIENT
Start: 2025-01-31 | End: 2025-02-01

## 2025-01-31 RX ORDER — SIMETHICONE 80 MG
80 TABLET,CHEWABLE ORAL 4 TIMES DAILY PRN
Status: DISCONTINUED | OUTPATIENT
Start: 2025-01-31 | End: 2025-02-03 | Stop reason: HOSPADM

## 2025-01-31 RX ORDER — OXYTOCIN 10 [USP'U]/ML
10 INJECTION, SOLUTION INTRAMUSCULAR; INTRAVENOUS
Status: DISCONTINUED | OUTPATIENT
Start: 2025-01-31 | End: 2025-01-31 | Stop reason: HOSPADM

## 2025-01-31 RX ORDER — AMOXICILLIN 250 MG
2 CAPSULE ORAL 2 TIMES DAILY
Status: DISCONTINUED | OUTPATIENT
Start: 2025-01-31 | End: 2025-02-03 | Stop reason: HOSPADM

## 2025-01-31 RX ORDER — ACETAMINOPHEN 325 MG/1
975 TABLET ORAL EVERY 6 HOURS
Status: DISCONTINUED | OUTPATIENT
Start: 2025-01-31 | End: 2025-02-03 | Stop reason: HOSPADM

## 2025-01-31 RX ADMIN — SODIUM CHLORIDE, POTASSIUM CHLORIDE, SODIUM LACTATE AND CALCIUM CHLORIDE 250 ML: 600; 310; 30; 20 INJECTION, SOLUTION INTRAVENOUS at 09:20

## 2025-01-31 RX ADMIN — Medication 100 MCG: at 09:35

## 2025-01-31 RX ADMIN — CALCIUM CARBONATE (ANTACID) CHEW TAB 500 MG 500 MG: 500 CHEW TAB at 22:50

## 2025-01-31 RX ADMIN — KETOROLAC TROMETHAMINE 15 MG: 15 INJECTION, SOLUTION INTRAMUSCULAR; INTRAVENOUS at 23:30

## 2025-01-31 RX ADMIN — METOCLOPRAMIDE 10 MG: 5 INJECTION, SOLUTION INTRAMUSCULAR; INTRAVENOUS at 12:42

## 2025-01-31 RX ADMIN — ACETAMINOPHEN 975 MG: 325 TABLET, FILM COATED ORAL at 21:19

## 2025-01-31 RX ADMIN — ACETAMINOPHEN 975 MG: 325 TABLET, FILM COATED ORAL at 14:59

## 2025-01-31 RX ADMIN — ACETAMINOPHEN 975 MG: 325 TABLET, FILM COATED ORAL at 07:04

## 2025-01-31 RX ADMIN — Medication 100 MCG: at 09:21

## 2025-01-31 RX ADMIN — Medication 100 MCG: at 09:44

## 2025-01-31 RX ADMIN — SENNOSIDES AND DOCUSATE SODIUM 1 TABLET: 50; 8.6 TABLET ORAL at 21:19

## 2025-01-31 RX ADMIN — SODIUM CHLORIDE, POTASSIUM CHLORIDE, SODIUM LACTATE AND CALCIUM CHLORIDE 500 ML: 600; 310; 30; 20 INJECTION, SOLUTION INTRAVENOUS at 06:26

## 2025-01-31 RX ADMIN — SODIUM CITRATE AND CITRIC ACID MONOHYDRATE 30 ML: 2004; 3000 SOLUTION ORAL at 07:04

## 2025-01-31 RX ADMIN — KETOROLAC TROMETHAMINE 15 MG: 15 INJECTION, SOLUTION INTRAMUSCULAR; INTRAVENOUS at 17:28

## 2025-01-31 RX ADMIN — KETOROLAC TROMETHAMINE 15 MG: 15 INJECTION, SOLUTION INTRAMUSCULAR; INTRAVENOUS at 10:45

## 2025-01-31 RX ADMIN — Medication 100 MCG: at 09:51

## 2025-01-31 RX ADMIN — SODIUM CHLORIDE, SODIUM LACTATE, POTASSIUM CHLORIDE, CALCIUM CHLORIDE AND DEXTROSE MONOHYDRATE: 5; 600; 310; 30; 20 INJECTION, SOLUTION INTRAVENOUS at 18:00

## 2025-01-31 ASSESSMENT — ACTIVITIES OF DAILY LIVING (ADL)
ADLS_ACUITY_SCORE: 36
ADLS_ACUITY_SCORE: 36
ADLS_ACUITY_SCORE: 27
ADLS_ACUITY_SCORE: 36
ADLS_ACUITY_SCORE: 27
ADLS_ACUITY_SCORE: 27
ADLS_ACUITY_SCORE: 46
ADLS_ACUITY_SCORE: 36
ADLS_ACUITY_SCORE: 46
ADLS_ACUITY_SCORE: 40
ADLS_ACUITY_SCORE: 27
ADLS_ACUITY_SCORE: 36
ADLS_ACUITY_SCORE: 46
ADLS_ACUITY_SCORE: 46
ADLS_ACUITY_SCORE: 36

## 2025-01-31 NOTE — OP NOTE
Operative Note    Pre-op Diagnosis:  Intrauterine Pregnancy at 36 2/7 weeks, History of Myomectomy, History of Deep venous thrombosis during pregnancy, Class A1 Gestational diabetes, Hypothyroidism during pregnancy, Pregnancy conceived via in vitro fertilization, Anemia, Advanced maternal age  Post-op Diagnosis:  Same,  live male infant delivered, Placenta accreta    Procedure: Primary Low Transverse  Section    Surgeon:  Dann Yee MD    Anesthesia:  Spinal  Fluids:  1200 cc LR, Ancef 2 gm IV pre-op, Pitocin after cord clamp  QBL:  522 cc  Drains:  Cummings - clear yellow urine during the case    Indications for Procedure:  Patient is a 44 year old G1 with an intrauterine pregnancy at 36 2/7 weeks who has had a myomectomy before and is to undergo  section at this time.  Her pregnancy has been complicated by deep venous thrombosis requiring anticoagulation with Lovenox which has resolved that clot but now she is on prophylactic dosing until 6 weeks postpartum, pregnancy conceived by in vitro fertilization, class A1 gestational diabetes, hypothyroidism on Synthroid, advanced maternal age with normal genetics on the embryo prior to implantation transfer, and also normal level 2 ultrasound with exception of a two-vessel umbilical cord and marginal cord insertion.  The patient has been followed closely by maternal-fetal medicine as well.  She understands risks, benefits, and alternatives to the above and has given informed consent.      Findings:   live male infant, VERTEX, 3110 g (6lb 14oz), Apgars 7 (1min) 8 (5min), amniotic fluid-Clear, 3v cord, placenta densely adherent requiring manual extraction and notable for approximately 5 cm portion of the placenta that remained attached to the fundus consistent with a placenta accreta.  This was ultimately able to be removed.  There was some irregularity of the fundal myometrium posteriorly that suggested still some retained placental  fragments however this was not able to be removed with the banjo curette and so this will be observed, normal uterus, normal tubes and ovaries bilaterally.    Specimens:  Placenta    Procedure in detail:  After proper patient identification and informed consent was obtained, the patient was taken to the operating room where adequate Spinal anesthesia was obtained.  Patient was placed in dorsal supine position with leftward tilt.  Cummings catheter was placed.  Patient was prepped and draped in usual sterile manner for this procedure.    A Pfannenstiel skin incision was made with a knife and carried down through the underlying subcutaneous fat to the anterior rectus fascia.  Bleeders were cauterized along the way.  The anterior rectus fascia was incised transversely with a knife and the incision was extended bilaterally in a semilunar fashion with curved Iniguez scissors.  The superior and inferior edges of the fascial incision were tented with Kocher clamps and sharply dissected from the underlying rectus muscle bellies.  The peritoneum was tented and incised with Metzenbaum scissors.  After this was performed, the peritoneal incision was extended superiorly and inferiorly avoiding the bladder.  An Richy-O retractor was placed.  Bladder blade was placed.  Vesicouterine peritoneum overlying the lower uterine segment was incised with a Metzenbaum scissors and the incision was extended bilaterally and the bladder flap was created with sharp dissection.  The bladder blade was placed to retract the bladder flap out of harm's way.    The lower uterine segment was then scored transversely with a knife superficially and then the incision was carried down in the central portion of the score line until the amniotic space was reached.  Clear fluid was noted.  Blunt traction was applied in a craniocaudad manner, extended the incision along the score line.  The vertex was floating out of the pelvis so a Kiwi vacuum was applied to the  flexion point and the vertex was brought down to the incision. The vertext was atraumatically delivered as it was lifted up through the incision with moderate fundal pressure. The Kiwi was removed, and the oropharynx and nares were bulb suctioned on the operative field.  The rest of the delivery occurred atraumatically without difficulty and the infant began crying on the operative field.  A 3v cord was noted which was doubly clamped and cut and the infant was handed off to the nursing staff in attendance.  Cord blood was obtained.  The patient received IV Pitocin.  She received Ancef preoperatively.  Placenta was noted to have dense adherence to the uterine wall especially at the fundus.  Manual extraction was performed with the bulk of the placenta being removed.  It was noted to be a 5 cm portion of the placenta densely attached to the fundus consistent with placenta accreta.  I was able to eventually excise this and send it along with the placenta to pathology.  There was some irregularity at the posterior fundus that suggested some retained placenta tissue there however a banjo curette was used to try to remove this and it did not come off.  The area was hemostatic and so left intact.  The uterine cavity was cleared of all other clots and debris.  The uterine incision was closed using a running locking #1 chromic gut suture and then followed by an imbricating suture of #1 chromic gut in vertical mattress suture.  The uterine incision was hemostatic at this point.  The bladder flap was reapproximated with a running 2-0 Vicryl suture.    The pericolic gutters were copiously irrigated with warm normal saline and cleared of any clots and debris.  Reinspection of all operative sites confirmed good hemostasis. The Richy-O retractor was removed.    The rectus muscle bellies were then loosely reapproximated in the midline using interrupted simple 2-0 Vicryl sutures to try to reduce the likelihood of diastasis.  The  subfascial space was copiously irrigated with warm normal saline and any remaining bleeders were cauterized.  The anterior rectus fascia was closed using running 0 PDS suture.  The subcutaneous layer was copiously irrigated with warm normal saline and any remaining bleeders were cauterized.  Subcutaneous layer was reapproximated using running 2-0 Monocryl sutures to close the dead space.  Skin was closed using running 4-0 Vicryl subcuticular suture.  Steri-Strips were placed.    The patient tolerated the procedure well.  Sponge, instrument and needle counts were correct x3.  The patient was taken to the recovery room in stable condition.    Dann Yee MD

## 2025-01-31 NOTE — PROGRESS NOTES
Data: Anahi Hannah transferred to [id number] via zoomcart at 1155 after visiting baby in NICU.   Action: Receiving unit notified of transfer: Yes. Patient and family notified of room change. Report given to ORA Cheek at 1200. Belongings sent to receiving unit. Accompanied by Registered Nurse. Oriented patient to surroundings. Call light within reach. ID bands double-checked with receiving RN.  Response: Patient tolerated transfer and is stable.    Patients mobililty level scored using the bedside mobility assistance tool (BMAT). Patient is at a mobility level test number: 1. Mobility equipment used: slider board. Required assist of 2 staff members. Further use of BMAT scoring required.

## 2025-01-31 NOTE — PROVIDER NOTIFICATION
"   01/31/25 1011   Provider Notification   Provider Name/Title Dr. Merlos   Method of Notification Phone   Request Evaluate - Remote   Notification Reason Maternal Vital Sign Change     MDA notified of persistent low blood pressures post partum. IVF 250ml bolus and phenylephrine x4 adminsitered with some improvement in blood pressures and symptoms (lightheadedness and nausea), blood pressures remain 90s/60s on average.     TORB to administer the rest of IVF bag \"wide open\", will reassess after bolus if patient remains hypotensive and symptomatic.  "

## 2025-02-01 LAB
GLUCOSE BLDC GLUCOMTR-MCNC: 68 MG/DL (ref 70–99)
HGB BLD-MCNC: 7.7 G/DL (ref 11.7–15.7)

## 2025-02-01 PROCEDURE — 250N000011 HC RX IP 250 OP 636: Performed by: OBSTETRICS & GYNECOLOGY

## 2025-02-01 PROCEDURE — 85018 HEMOGLOBIN: CPT | Performed by: OBSTETRICS & GYNECOLOGY

## 2025-02-01 PROCEDURE — 258N000003 HC RX IP 258 OP 636: Performed by: OBSTETRICS & GYNECOLOGY

## 2025-02-01 PROCEDURE — 120N000001 HC R&B MED SURG/OB

## 2025-02-01 PROCEDURE — 36415 COLL VENOUS BLD VENIPUNCTURE: CPT | Performed by: OBSTETRICS & GYNECOLOGY

## 2025-02-01 PROCEDURE — 250N000013 HC RX MED GY IP 250 OP 250 PS 637: Performed by: OBSTETRICS & GYNECOLOGY

## 2025-02-01 RX ORDER — DIPHENHYDRAMINE HYDROCHLORIDE 50 MG/ML
50 INJECTION INTRAMUSCULAR; INTRAVENOUS
Status: DISCONTINUED | OUTPATIENT
Start: 2025-02-01 | End: 2025-02-03 | Stop reason: HOSPADM

## 2025-02-01 RX ORDER — ALBUTEROL SULFATE 0.83 MG/ML
2.5 SOLUTION RESPIRATORY (INHALATION)
Status: DISCONTINUED | OUTPATIENT
Start: 2025-02-01 | End: 2025-02-03 | Stop reason: HOSPADM

## 2025-02-01 RX ORDER — MEPERIDINE HYDROCHLORIDE 25 MG/ML
25 INJECTION INTRAMUSCULAR; INTRAVENOUS; SUBCUTANEOUS
Status: DISCONTINUED | OUTPATIENT
Start: 2025-02-01 | End: 2025-02-03 | Stop reason: HOSPADM

## 2025-02-01 RX ORDER — DIPHENHYDRAMINE HYDROCHLORIDE 50 MG/ML
25 INJECTION INTRAMUSCULAR; INTRAVENOUS
Status: DISCONTINUED | OUTPATIENT
Start: 2025-02-01 | End: 2025-02-03 | Stop reason: HOSPADM

## 2025-02-01 RX ORDER — ALBUTEROL SULFATE 90 UG/1
1-2 INHALANT RESPIRATORY (INHALATION)
Status: DISCONTINUED | OUTPATIENT
Start: 2025-02-01 | End: 2025-02-03 | Stop reason: HOSPADM

## 2025-02-01 RX ADMIN — ACETAMINOPHEN 975 MG: 325 TABLET, FILM COATED ORAL at 10:02

## 2025-02-01 RX ADMIN — IBUPROFEN 600 MG: 600 TABLET ORAL at 18:45

## 2025-02-01 RX ADMIN — IBUPROFEN 600 MG: 600 TABLET ORAL at 12:20

## 2025-02-01 RX ADMIN — SENNOSIDES AND DOCUSATE SODIUM 1 TABLET: 50; 8.6 TABLET ORAL at 08:03

## 2025-02-01 RX ADMIN — ENOXAPARIN SODIUM 40 MG: 40 INJECTION SUBCUTANEOUS at 08:03

## 2025-02-01 RX ADMIN — ACETAMINOPHEN 975 MG: 325 TABLET, FILM COATED ORAL at 16:34

## 2025-02-01 RX ADMIN — PANTOPRAZOLE SODIUM 40 MG: 40 TABLET, DELAYED RELEASE ORAL at 08:04

## 2025-02-01 RX ADMIN — ACETAMINOPHEN 975 MG: 325 TABLET, FILM COATED ORAL at 03:06

## 2025-02-01 RX ADMIN — ACETAMINOPHEN 975 MG: 325 TABLET, FILM COATED ORAL at 22:34

## 2025-02-01 RX ADMIN — SENNOSIDES AND DOCUSATE SODIUM 2 TABLET: 50; 8.6 TABLET ORAL at 22:34

## 2025-02-01 RX ADMIN — KETOROLAC TROMETHAMINE 15 MG: 15 INJECTION, SOLUTION INTRAMUSCULAR; INTRAVENOUS at 05:29

## 2025-02-01 RX ADMIN — LEVOTHYROXINE SODIUM 50 MCG: 0.05 TABLET ORAL at 08:03

## 2025-02-01 ASSESSMENT — ACTIVITIES OF DAILY LIVING (ADL)
ADLS_ACUITY_SCORE: 29
ADLS_ACUITY_SCORE: 29
ADLS_ACUITY_SCORE: 31
ADLS_ACUITY_SCORE: 29
ADLS_ACUITY_SCORE: 29
ADLS_ACUITY_SCORE: 31
ADLS_ACUITY_SCORE: 31
ADLS_ACUITY_SCORE: 29
ADLS_ACUITY_SCORE: 31
ADLS_ACUITY_SCORE: 29
ADLS_ACUITY_SCORE: 30
ADLS_ACUITY_SCORE: 29
ADLS_ACUITY_SCORE: 31
ADLS_ACUITY_SCORE: 29
ADLS_ACUITY_SCORE: 31
ADLS_ACUITY_SCORE: 31
ADLS_ACUITY_SCORE: 29
ADLS_ACUITY_SCORE: 31
ADLS_ACUITY_SCORE: 31
ADLS_ACUITY_SCORE: 29
ADLS_ACUITY_SCORE: 31

## 2025-02-01 NOTE — PROGRESS NOTES
[facility] OB Addendum    Hemoglobin   Date Value Ref Range Status   02/01/2025 7.7 (L) 11.7 - 15.7 g/dL Final     This is c/w Acute blood loss anemia superimposed on chronic Anemia. Will give Venofer 300 mg IV today.    Dann Yee MD

## 2025-02-01 NOTE — PROGRESS NOTES
[facility] OB/GYN Postop C/S Note    S:  Patient without complaints other than typical soreness.  Minimal lochia.  Flatus passed, tolerating Clear liquids diet well    O:  Blood pressure 103/59, pulse 75, temperature 97.8  F (36.6  C), temperature source Oral, resp. rate 18, SpO2 98%, unknown if currently breastfeeding.          Intake/Output Summary (Last 24 hours) at 2/1/2025 0732  Last data filed at 2/1/2025 0533  Gross per 24 hour   Intake 800 ml   Output 1022 ml   Net -222 ml           Abdomen - Non-distended, Normoactive bowel sounds, soft, appropriately tender; Fundus firm, at umbilicus, nontender        Dressing/Incision - clean, dry, intact        Extremities - No calf tenderness    Hemoglobin   Date Value Ref Range Status   01/31/2025 10.0 (L) 11.7 - 15.7 g/dL Final   01/03/2025 10.5 (L) 11.7 - 15.7 g/dL Final   ]      A:   Postop Day# 1, s/p Primary LTCS        Urine output adequate        Baby in NICU on CPAP    P:  1)  Routine care        2)  Check Hgb today, will start Fe prn.        3)  Probable D/C in 2 days.      Dann Yee MD

## 2025-02-01 NOTE — LACTATION NOTE
"Lactation visit; LPT infant in NICU- has not started pumping. When discussing feeding plan/goal Anahi states her plan is \"to not be stressed\" but would like to attempt to get milk supply. Education provided on importance of consistent pumping (q3 hours) to establish milk supply. Reviewed benefits of hand expression and QR code provided for [facility] hand expression video. Anahi agrees to start pumping- writer assisted with making hands free pump bra.   Assisted with pump set up, reviewed settings, and care/cleaning of parts.   When pump started- Anahi \"startled and yelled out\"- states she isn't in pain but not comfortable either and that she has sensitive breasts. Started on lowest settings and discussed increasing as tolerated.  Breasts are widely spaced and tubular in shape- small amount of tissue. Denies history of PCOS. Discussed potential delays of lactogenesis II with GDM.  Anahi aware lactation available for support as needed.  All questions answered.  "

## 2025-02-01 NOTE — PLAN OF CARE
Outcome Evaluation  Arrived to floor at Noon today 1/31. Patient feeling nauseated, cool and clammy. Patient has many allergies and sensitivities. BS- 95 prior to delivery. QBL- 522, HGB 10.0. VSS. Did receive a liter of IV fluid in the post op period.due to hypotension, patient is back up to baseline. Aler and oriented. Has only taken ice chips. Urine output emptied before transferring- 100 ml      Data: Vital signs within normal limits. Postpartum checks within normal limits - see flow record. Patient only take ice chips since arrival to room 426. She has been able to take down a couple of saltine crackers, and a few sips of water and apple juice. IV Reglan given for nausea. Patient did have a small emesis, maybe 50 ml. Will be emptying vargas cath at 1830. 100 mls were emptied by Labor and Delivery prior to transferring to 426. No apparent signs of infection. Incision covered with foam tape. Not leaking.  Baby in the NICU right from the OR.   Action: Patient medicated during the shift for pain and cramping. See MAR. Patient reassessed within 1 hour after each medication and pain was improved - patient stated she was comfortable. Patient education done about room orientation, paperwork to be completed, medications. See flow record.  Response: Ipad set up in room to view baby. Patient just starting to feel a little better, she is going to order a clear liquid tray for dinner. IV fluids D5LR at 125 ml/hr re-started for patient at 1730.  at bedside, and patient's mother.   Plan: Anticipate discharge on 2/3/25    Emptied vargas at 1900- 100 ml of clear chetna urine. Patient was able to eat a clear liquid tray and keep it down. Nausea is much better. Patient still has some shakiness on and off. An ice pack was given for her incision. When patient's  comes back- she would like to go down to NICU to visit baby.       Problem: Adult Inpatient Plan of Care  Goal: Plan of Care Review  Description: The Plan of  "Care Review/Shift note should be completed every shift.  The Outcome Evaluation is a brief statement about your assessment that the patient is improving, declining, or no change.  This information will be displayed automatically on your shift  note.  1/31/2025 1812 by Mine Gamino RN  Outcome: Progressing  Flowsheets (Taken 1/31/2025 1812)  Outcome Evaluation: Arrived to floor at Noon today 1/31. Patient feeling nauseated, cool and clammy. Patient has many allergies and sensitivities. BS- 95 prior to delivery. QBL- 522, HGB 10.0. VSS. Did receive a liter of IV fluid in the post op period.due to hypotension, patient is back up to baseline. Aler and oriented. Has only taken ice chips. Urine output emptied before transferring- 100 ml  Overall Patient Progress: improving  1/31/2025 1812 by Mine Gamino RN  Outcome: Progressing  Flowsheets  Taken 1/31/2025 1812  Outcome Evaluation: Arrived to floor at Noon today 1/31. Patient feeling nauseated, cool and clammy. Patient has many allergies and sensitivities. BS- 95 prior to delivery. QBL- 522, HGB 10.0. VSS. Did receive a liter of IV fluid in the post op period.due to hypotension, patient is back up to baseline. Aler and oriented. Has only taken ice chips. Urine output emptied before transferring- 100 ml  Overall Patient Progress: improving  Taken 1/31/2025 1809  Outcome Evaluation: Scheduled C/S 36 2/7 weeks. Due to history of of Myomectomy  Plan of Care Reviewed With:   patient   spouse  Overall Patient Progress: improving  Goal: Patient-Specific Goal (Individualized)  Description: You can add care plan individualizations to a care plan. Examples of Individualization might be:  \"Parent requests to be called daily at 9am for status\", \"I have a hard time hearing out of my right ear\", or \"Do not touch me to wake me up as it startles  me\".  1/31/2025 1812 by Mine Gamino RN  Outcome: Progressing  1/31/2025 1812 by Stevenson, " ORA Blount  Outcome: Progressing  Goal: Absence of Hospital-Acquired Illness or Injury  1/31/2025 1812 by Mine Gamino RN  Outcome: Progressing  1/31/2025 1812 by Mine Gamino RN  Outcome: Progressing  Intervention: Prevent Skin Injury  Recent Flowsheet Documentation  Taken 1/31/2025 1459 by Mine Gamino RN  Body Position: position changed independently  Intervention: Prevent and Manage VTE (Venous Thromboembolism) Risk  Recent Flowsheet Documentation  Taken 1/31/2025 1330 by Mine Gamino RN  VTE Prevention/Management: SCDs on (sequential compression devices)  Goal: Optimal Comfort and Wellbeing  1/31/2025 1812 by Mine Gamino RN  Outcome: Progressing  1/31/2025 1812 by Mine Gamino RN  Outcome: Progressing  Intervention: Provide Person-Centered Care  Recent Flowsheet Documentation  Taken 1/31/2025 1330 by Mine Gamino RN  Trust Relationship/Rapport:   care explained   choices provided   emotional support provided   empathic listening provided   questions answered   questions encouraged   reassurance provided   thoughts/feelings acknowledged  Goal: Readiness for Transition of Care  1/31/2025 1812 by Mine Gamino RN  Outcome: Progressing  1/31/2025 1812 by Mine Gamino RN  Outcome: Progressing

## 2025-02-01 NOTE — PLAN OF CARE
Goal Outcome Evaluation:      Plan of Care Reviewed With: patient    Overall Patient Progress: improvingOverall Patient Progress: improving         Patient's vital signs stable, fundal checks and bleeding within normal limits. Pain being managed with scheduled pain medications. She is up out of bed with stand by assist. Went to the NICU to visit and hold baby before bed last night. Cummings removed at 0545 this morning, due to void at 0945, patient aware and will call for assistance when she needs to go to the bathroom.  at bedside, supportive of patient.       Problem: Adult Inpatient Plan of Care  Goal: Plan of Care Review  Description: The Plan of Care Review/Shift note should be completed every shift.  The Outcome Evaluation is a brief statement about your assessment that the patient is improving, declining, or no change.  This information will be displayed automatically on your shift  note.  Outcome: Progressing  Flowsheets (Taken 2/1/2025 0550)  Plan of Care Reviewed With: patient  Overall Patient Progress: improving  Goal: Absence of Hospital-Acquired Illness or Injury  Outcome: Progressing  Intervention: Prevent Skin Injury  Recent Flowsheet Documentation  Taken 1/31/2025 2333 by Zeinab Sosa, RN  Body Position: position changed independently  Intervention: Prevent Infection  Recent Flowsheet Documentation  Taken 1/31/2025 2333 by Zeinab Sosa, RN  Infection Prevention:   rest/sleep promoted   hand hygiene promoted  Goal: Optimal Comfort and Wellbeing  Outcome: Progressing  Intervention: Monitor Pain and Promote Comfort  Recent Flowsheet Documentation  Taken 1/31/2025 2333 by Zeinab Sosa, RN  Pain Management Interventions:   medication (see MAR)   cold applied  Intervention: Provide Person-Centered Care  Recent Flowsheet Documentation  Taken 1/31/2025 2333 by Zeinab Sosa, RN  Trust Relationship/Rapport:   care explained   choices provided   emotional support provided   empathic  listening provided   questions answered   questions encouraged   reassurance provided   thoughts/feelings acknowledged  Goal: Readiness for Transition of Care  Outcome: Progressing     Problem: Postpartum ( Delivery)  Goal: Successful Parent Role Transition  Outcome: Progressing  Intervention: Support Parent Role Transition  Recent Flowsheet Documentation  Taken 2025 by Zeinab Sosa, RN  Parent-Child Attachment Promotion: (NICU ipad set up) other (see comments)  Goal: Hemostasis  Outcome: Progressing  Goal: Effective Bowel Elimination  Outcome: Progressing  Goal: Fluid and Electrolyte Balance  Outcome: Progressing  Goal: Absence of Infection Signs and Symptoms  Outcome: Progressing  Goal: Anesthesia/Sedation Recovery  Outcome: Progressing  Goal: Optimal Pain Control and Function  Outcome: Progressing  Intervention: Prevent or Manage Pain  Recent Flowsheet Documentation  Taken 2025 by Zeinab Sosa, RN  Pain Management Interventions:   medication (see MAR)   cold applied  Goal: Nausea and Vomiting Relief  Outcome: Progressing  Goal: Effective Urinary Elimination  Outcome: Progressing  Goal: Effective Oxygenation and Ventilation  Outcome: Progressing  Intervention: Optimize Oxygenation and Ventilation  Recent Flowsheet Documentation  Taken 2025 by Zeinab Sosa, RN  Head of Bed (HOB) Positioning: HOB at 30-45 degrees

## 2025-02-02 PROCEDURE — 250N000013 HC RX MED GY IP 250 OP 250 PS 637: Performed by: OBSTETRICS & GYNECOLOGY

## 2025-02-02 PROCEDURE — 250N000011 HC RX IP 250 OP 636: Performed by: OBSTETRICS & GYNECOLOGY

## 2025-02-02 PROCEDURE — 120N000001 HC R&B MED SURG/OB

## 2025-02-02 RX ADMIN — SENNOSIDES AND DOCUSATE SODIUM 1 TABLET: 50; 8.6 TABLET ORAL at 10:17

## 2025-02-02 RX ADMIN — ACETAMINOPHEN 975 MG: 325 TABLET, FILM COATED ORAL at 23:49

## 2025-02-02 RX ADMIN — PANTOPRAZOLE SODIUM 40 MG: 40 TABLET, DELAYED RELEASE ORAL at 10:16

## 2025-02-02 RX ADMIN — LEVOTHYROXINE SODIUM 50 MCG: 0.05 TABLET ORAL at 06:51

## 2025-02-02 RX ADMIN — ACETAMINOPHEN 975 MG: 325 TABLET, FILM COATED ORAL at 05:01

## 2025-02-02 RX ADMIN — ENOXAPARIN SODIUM 40 MG: 40 INJECTION SUBCUTANEOUS at 10:17

## 2025-02-02 RX ADMIN — IBUPROFEN 600 MG: 600 TABLET ORAL at 19:42

## 2025-02-02 RX ADMIN — SIMETHICONE 80 MG: 80 TABLET, CHEWABLE ORAL at 06:49

## 2025-02-02 RX ADMIN — ACETAMINOPHEN 975 MG: 325 TABLET, FILM COATED ORAL at 17:49

## 2025-02-02 RX ADMIN — IBUPROFEN 600 MG: 600 TABLET ORAL at 06:46

## 2025-02-02 RX ADMIN — IBUPROFEN 600 MG: 600 TABLET ORAL at 12:44

## 2025-02-02 RX ADMIN — SIMETHICONE 80 MG: 80 TABLET, CHEWABLE ORAL at 17:58

## 2025-02-02 RX ADMIN — IBUPROFEN 600 MG: 600 TABLET ORAL at 00:40

## 2025-02-02 RX ADMIN — SENNOSIDES AND DOCUSATE SODIUM 2 TABLET: 50; 8.6 TABLET ORAL at 23:49

## 2025-02-02 RX ADMIN — ACETAMINOPHEN 975 MG: 325 TABLET, FILM COATED ORAL at 10:44

## 2025-02-02 RX ADMIN — SIMETHICONE 80 MG: 80 TABLET, CHEWABLE ORAL at 00:45

## 2025-02-02 ASSESSMENT — ACTIVITIES OF DAILY LIVING (ADL)
ADLS_ACUITY_SCORE: 31

## 2025-02-02 NOTE — CONSULTS
"[facility]  MATERNAL CHILD HEALTH   INITIAL PSYCHOSOCIAL ASSESSMENT     DATA:     Reason for Social Work Consult: NICU admission     Presenting Information: YUNIEL met with Anahi at bedside to introduce role, offer support and complete initial assessment.    Living Situation/Family Constellation: Anahi lives with her  in [place].     Social Support: Anahi shared that both she and her  have involved family members able to support during this time.     Employment/financial support: No concerns reported    Insurance: [facility] CHOICE     Mental Health History: Anahi shared that she knows she has a panic and anxiety disorder. She also shared that she has struggled with eating disorders in the past, with symptoms including anorexia and compulsive exercising. She tried talk therapy intermittently but did not find it helpful and had worries about providers wanting to \"shove medications down my throat\". Upon further exploration of Anahi's resistance to mental health medication, she shared that she has a lot of allergies and is resistant to some medications. She shared that she has been told mental health medication is unpredictable and you may have to try multiple variations until you find what works. This lack of certainty triggered her anxiety and makes her not want to try.     History of Postpartum Mood Disorders: n/a first child    Chemical Health History: no concerns reported    Community Resources/PHN/WIC independent in accessing services      //Baby Supplies: no concerns reported       INTERVENTION:     YUNIEL completed chart review and collaborated with the multidisciplinary team.   Psychosocial Assessment   Introduction to Maternal Child Health  role and scope of practice   Reviewed Hospital and Community Resources   Assessed Mental Health History and Current Symptoms   Identified stressors, barriers and family concerns   Provided support and active " empathetic listening and validation.   Provided psychoeducation on  mood and anxiety disorders, assessed for any current symptoms or history  Provided brochure Depression and Anxiety During and after Pregnancy.     ASSESSMENT:     Coping: patient was tearful and expressed anxiety    Affect: appropriate    Mood: appropriate, anxious    Motivation/Ability to Access Services: independent in accessing services but may not be motivated due to reservations about mental health medications and unhelpful experience with therapy in the past    Assessment of Support System: stable, involved    Level of engagement with SW: Engaged and appropriate. Able to seek out SW when needs arise.     Family and parent/infant interactions: (attentiveness to baby, interactions between parents) Parents seem supportive of each other. Anahi has expressed nervousness around baby's cares but did do some skin-to-skin yesterday. CARMELITA articulated his support of MOB and approached her multiple times during this encounter when she was getting emotional to offer tissues/physical touch.    Assessment of parental risk for PMAD:   Higher than average risk given untreated anxiety and panic disorder, NICU admission     Strengths: has a support system    Vulnerabilities: has had trouble finding mental health treatment that works for her      Identified Barriers:   none identified at this time    PLAN:   Patient's untreated mental health symptoms and history put her at higher risk for postpartum mood disorders.     SW discussed strategies for emotional wellness postpartum with the patient, including sleep, movement, social connection and nutrition. Reviewed signs and symptoms of postpartum mood disorders and talked about how to get help if needed. Discussed that there are a variety of ways to treat postpartum mood disorders, including therapy and medication.     Patient expressed anxiety around breast feeding, and was fearful that during her upcoming  "visit with lactation she would \"scream in pain\" and subsequently scare the baby. SW asked about her feeding plan and wishes. Patient said she would \"like to try\" breastfeeding but was fearful of how it may impact her mental health symptoms. She worries breastfeeding will be a stressful experience and is considering the impact the disruption to her sleep could have on her mental health. SW encouraged her to discuss her worries further with lactation. SW also attempted to normalize/validate all feeding methods and praised her considering her mental health as she works on a plan that will work best for her family.     SW let patient know team would be happy to share resources for  practitioners in the area at her request. Patient has not found therapy to be helpful in the past.     SW spoke with MD to review patient's risk factors for postpartum mood disorders and MD said she will recommend patient be seen in her primary clinic within 1 week of discharge rather than at 6 weeks.     SW will continue to follow throughout pt's Maternal-Child Health Journey as needs arise. YUNIEL will continue to collaborate with the multidisciplinary team.    EVERETT Pineda, [facility]   Care Coordinator-Casual  [email or web address]   "

## 2025-02-02 NOTE — PLAN OF CARE
"Goal Outcome Evaluation:      Plan of Care Reviewed With: patient    Overall Patient Progress: improvingOverall Patient Progress: improving    Outcome Evaluation: Patient has been voiding spontaneously in good amounts- encouraged to push more fluids, She does not drink enough water or liquids. Does not like the \"taste of water\". Encouraged to find other things that may flavor the water a little bit more for her without adding sugar to it. VSS. Patient has been pushing off IV Iron all day, hopefully now towards dinner time patient will take it. Patient's pain level acceptable to patient, taking oral Ibuprofen, and Tylenol. Using abdominal binder, ice to abdomen for comfort. Uterine bldg is at a minimum.    Data: Vital signs within normal limits. Postpartum checks within normal limits - see flow record. Patient eating and drinking normally. Patient able to empty bladder independently and is up ambulating. Encouraging patient to push fluids.  No apparent signs of infection. Incision healing well. Patient showered and abdominal dressing off. Using abdominal binder. Patient performing self cares and is able to care for infant. Encouraging patient to ambulate more in halls vs just in the room.   Action: Patient medicated during the shift for pain and cramping. See MAR. Patient reassessed within 1 hour after each medication and pain was improved - patient stated she was comfortable.   Response: Positive attachment behaviors observed with infant. Patient's , and mother at bedside.   Plan: Anticipate discharge on 2/3/25    Problem: Adult Inpatient Plan of Care  Goal: Plan of Care Review  Description: The Plan of Care Review/Shift note should be completed every shift.  The Outcome Evaluation is a brief statement about your assessment that the patient is improving, declining, or no change.  This information will be displayed automatically on your shift  note.  2/1/2025 1833 by Mine Gamino RN  Outcome: " "Progressing  Flowsheets (Taken 2/1/2025 1833)  Outcome Evaluation: Patient has been voiding spontaneously in good amounts- encouraged to push more fluids, She does not drink enough water or liquids. Does not like the \"taste of water\". Encouraged to find other things that may flavor the water a little bit more for her without adding sugar to it. VSS. Patient has been pushing off IV Iron all day, hopefully now towards dinner time patient will take it. Patient's pain level acceptable to patient, taking oral Ibuprofen, and Tylenol. Using abdominal binder, ice to abdomen for comfort. Uterine bldg is at a minimum.  Plan of Care Reviewed With: patient  Overall Patient Progress: improving  2/1/2025 1642 by Mine Gamino RN  Outcome: Progressing  Flowsheets (Taken 2/1/2025 1623)  Plan of Care Reviewed With: patient  Overall Patient Progress: improving  Goal: Patient-Specific Goal (Individualized)  Description: You can add care plan individualizations to a care plan. Examples of Individualization might be:  \"Parent requests to be called daily at 9am for status\", \"I have a hard time hearing out of my right ear\", or \"Do not touch me to wake me up as it startles  me\".  2/1/2025 1833 by Mine Gamino RN  Outcome: Progressing  2/1/2025 1642 by Mine Gamino RN  Outcome: Progressing  Goal: Absence of Hospital-Acquired Illness or Injury  2/1/2025 1833 by Mine Gamino RN  Outcome: Progressing  2/1/2025 1642 by Mine Gamino RN  Outcome: Progressing  Intervention: Prevent Skin Injury  Recent Flowsheet Documentation  Taken 2/1/2025 0800 by Mine Gamino RN  Body Position: position changed independently  Intervention: Prevent Infection  Recent Flowsheet Documentation  Taken 2/1/2025 0800 by Mine Gamino RN  Infection Prevention:   rest/sleep promoted   hand hygiene promoted  Goal: Optimal Comfort and Wellbeing  2/1/2025 1833 by Stevenson" Mine, ORA  Outcome: Progressing  2/1/2025 1642 by Mine Gamino RN  Outcome: Progressing  Intervention: Monitor Pain and Promote Comfort  Recent Flowsheet Documentation  Taken 2/1/2025 1002 by Mine Gamion RN  Pain Management Interventions:   medication (see MAR)   cold applied  Intervention: Provide Person-Centered Care  Recent Flowsheet Documentation  Taken 2/1/2025 0800 by Mine Gamino RN  Trust Relationship/Rapport:   care explained   choices provided   emotional support provided   empathic listening provided   questions answered   questions encouraged   reassurance provided   thoughts/feelings acknowledged  Goal: Readiness for Transition of Care  2/1/2025 1833 by Mine Gamino RN  Outcome: Progressing  2/1/2025 1642 by Mine Gamino, RN  Outcome: Progressing

## 2025-02-02 NOTE — PLAN OF CARE
Goal Outcome Evaluation:      Plan of Care Reviewed With: patient    Overall Patient Progress: improvingOverall Patient Progress: improving         Patient's vital signs stable, up independently in room. Voiding spontaneously, encouraged to drink more fluids. Administered IV iron last evening finally, patient tolerated it well. Pain being managed with scheduled pain medications, did complain of some gas pain in the middle of the night and simethicone was given. Patient did pump every 3 hours overnight, got a few drops of colostrum.  at bedside, supportive of patient.       Problem: Adult Inpatient Plan of Care  Goal: Plan of Care Review  Description: The Plan of Care Review/Shift note should be completed every shift.  The Outcome Evaluation is a brief statement about your assessment that the patient is improving, declining, or no change.  This information will be displayed automatically on your shift  note.  Outcome: Progressing  Flowsheets (Taken 2/2/2025 0530)  Plan of Care Reviewed With: patient  Overall Patient Progress: improving  Goal: Absence of Hospital-Acquired Illness or Injury  Outcome: Progressing  Intervention: Prevent Skin Injury  Recent Flowsheet Documentation  Taken 2/2/2025 0043 by Zeinab Sosa, RN  Body Position: position changed independently  Intervention: Prevent Infection  Recent Flowsheet Documentation  Taken 2/2/2025 0043 by Zeinab Sosa, RN  Infection Prevention:   hand hygiene promoted   rest/sleep promoted   single patient room provided  Taken 2/1/2025 2047 by Zeinab Sosa, RN  Infection Prevention:   hand hygiene promoted   rest/sleep promoted   single patient room provided  Goal: Optimal Comfort and Wellbeing  Outcome: Progressing  Intervention: Monitor Pain and Promote Comfort  Recent Flowsheet Documentation  Taken 2/2/2025 0043 by Zeinab Sosa, RN  Pain Management Interventions: (encouraged walking)   medication (see MAR)   repositioned   other (see  comments)  Taken 2025 by Zeinab Sosa, RN  Pain Management Interventions:   medication (see MAR)   cold applied  Intervention: Provide Person-Centered Care  Recent Flowsheet Documentation  Taken 2025 by Zeinab Sosa, RN  Trust Relationship/Rapport:   care explained   choices provided   emotional support provided   empathic listening provided   questions answered   questions encouraged   reassurance provided   thoughts/feelings acknowledged  Taken 2025 by Zeinab Sosa RN  Trust Relationship/Rapport:   care explained   choices provided   emotional support provided   empathic listening provided   questions answered   questions encouraged   reassurance provided   thoughts/feelings acknowledged  Goal: Readiness for Transition of Care  Outcome: Progressing     Problem: Postpartum ( Delivery)  Goal: Successful Parent Role Transition  Outcome: Progressing  Intervention: Support Parent Role Transition  Recent Flowsheet Documentation  Taken 2025 by Zeinab Sosa, RN  Supportive Measures:   active listening utilized   decision-making supported   positive reinforcement provided   relaxation techniques promoted   self-care encouraged  Parent-Child Attachment Promotion: (NICU ipad set up, NICU visits encouraged) other (see comments)  Taken 2025 by Zeinab Sosa, RN  Supportive Measures:   active listening utilized   decision-making supported   positive reinforcement provided   relaxation techniques promoted   self-care encouraged  Parent-Child Attachment Promotion: (NICU ipad set up, NICU visits encouraged) other (see comments)  Goal: Hemostasis  Outcome: Progressing  Goal: Effective Bowel Elimination  Outcome: Progressing  Goal: Fluid and Electrolyte Balance  Outcome: Progressing  Goal: Absence of Infection Signs and Symptoms  Outcome: Progressing  Goal: Optimal Pain Control and Function  Outcome: Progressing  Intervention: Prevent or Manage  Pain  Recent Flowsheet Documentation  Taken 2/2/2025 0043 by Zeinab Sosa, RN  Pain Management Interventions: (encouraged walking)   medication (see MAR)   repositioned   other (see comments)  Taken 2/1/2025 2047 by Zeinab Sosa, RN  Pain Management Interventions:   medication (see MAR)   cold applied

## 2025-02-02 NOTE — PLAN OF CARE
Patient finally at a point today to administer IV Iron. IV saline flushed well. Patient is sensitive to IV flushes. IV Iron started, and within a minute, patient was complaining of discomfort at IV site. Site started to look infiltrated , IV Iron stopped, obtained a warm pack to place on site, and removed Saline lock. Will call Flying squad RN to start a new IV, report to give to on coming RN for night shift.

## 2025-02-02 NOTE — PROGRESS NOTES
"  February 2, 2025    DAILY NOTE - POSTOP DAY 2    SUBJECTIVE: Doing okay, no concerns.    Pain controlled? Yes  Tolerating a regular diet? YES  Ambulating? YES  Voiding without difficulty? Yes  Lochia? minimal  Breastfeeding:  no baby in NICU but plans to breastfeed        OBJECTIVE:  Vitals:    02/01/25 2150 02/01/25 2214 02/02/25 0043 02/02/25 1044   BP: 116/52 122/61 115/49 114/64   BP Location: Left arm Left arm Left arm Left arm   Patient Position: Supine Supine Semi-Lambert's Semi-Lambert's   Cuff Size: Adult Regular Adult Regular Adult Regular Adult Regular   Pulse: 83 83 73 86   Resp: 16 16 16 18   Temp: 98.1  F (36.7  C) 98.4  F (36.9  C)  98.3  F (36.8  C)   TempSrc: Oral Oral  Oral   SpO2:           Constitutional: healthy, alert, and no distress  Abdomen:  Uterine fundus is firm, non-tender and at the level of the umbilicus   Incision: C/D/I   LE:  trace edema, non-tender      LABS:  Hemoglobin   Date Value Ref Range Status   02/01/2025 7.7 (L) 11.7 - 15.7 g/dL Final   01/31/2025 10.0 (L) 11.7 - 15.7 g/dL Final     No results found for: \"RUBELLAABIGG\"   No results found for: \"ABO\"      No results found for: \"RH\"   No components found for: \"CMP\"      ASSESSMENT:   POD #2  Primary LTCS    Doing well.  Pain well-controlled.       PLAN:      Continue routine care  Ambulation encouraged  SW consult today, see notes.  Needs close postpartum follow up, likely 1 week postpartum appointment with Dr. Yee     Anticipate discharge tomorrow    Vandana Rodriguez MD   "

## 2025-02-02 NOTE — LACTATION NOTE
"This note was copied from a baby's chart.  Lactation visit for first time to breast; Anahi reports she had been working on some hand expression but that she has very sensitive nipples-states some nervousness with offering breast but would like to try. Reinforced gentle expression as tolerated and reinforced we can remove infant from breast if not tolerating.   Brought Imtiaz STS to right breast in cross cradle hold. Imtiaz eager to open mouth wide- with first suck Anahi \"yelping\" and a little shaky but did some deep breathing and worked through. Education reviewed on deep latch techniques and how to position. Imtiaz taking 1-2 sucks- having some difficulty sustaining latch but is practicing. Discussed trying nipple shield with next attempt and reviewed helping create suction. May or may not help with nipple sensitivity. Anahi states it is not painful but seems to startle with any sucks from infant. Infant fatigued quickly and primary RN assisting parents with a bottle feed.   Anahi reports getting some drops with pump- but that it was leaking- reinforced leaning forward during pump session and suggested nipple sizing for flange sizes prior to discharge.   All questions answered.    "

## 2025-02-02 NOTE — PLAN OF CARE
Goal Outcome Evaluation:      Plan of Care Reviewed With: patient    Overall Patient Progress: improvingOverall Patient Progress: improving    Outcome Evaluation: Iron infusion completed. PO Tylenol, and Ibuprofen given every 6 hrs for pain. Using abdominal binder. Ambulating down the rodriguez with wheelchair for support. Pumping. Down to NICU to see baby, Put baby to breast for the first time today,    Data: Vital signs within normal limits. Postpartum checks within normal limits - see flow record. Patient eating and drinking normally. Patient able to empty bladder independently and is up ambulating. Incision covered with steri strips that have dried blood. Incisional cares gone over with patient.  No apparent signs of infection. Incision healing well. Patient performing self cares and is able to care for infant.  Action: Patient medicated during the shift for pain and cramping. See MAR. Patient reassessed within 1 hour after each medication and pain was improved - patient stated she was comfortable. Patient education done about breast cares, pumping, hand expression. See flow record. Showed patient hand expression on her left side, and apparently she is very sensitive on the left side, and did not give any warming to writer, and yelled out in pain, and started shaking. Right away apologized to patient. Did lots of teaching around hand expression, how breastfeeding is going to feel, encouraged her to watch the hand expression video, to read postpartum book. Her plan is to at least breastfeed 12 weeks.   Response: Infant in NICU- Ipad in room to visualize infant.  at bedside.   Plan: Anticipate discharge on 2/3/25      Problem: Adult Inpatient Plan of Care  Goal: Plan of Care Review  Description: The Plan of Care Review/Shift note should be completed every shift.  The Outcome Evaluation is a brief statement about your assessment that the patient is improving, declining, or no change.  This information will be  "displayed automatically on your shift  note.  Outcome: Progressing  Flowsheets (Taken 2/2/2025 1504)  Outcome Evaluation: Iron infusion completed. PO Tylenol, and Ibuprofen given every 6 hrs for pain. Using abdominal binder. Ambulating down the rodriguez with wheelchair for support. Pumping. Down to NICU to see baby, Put baby to breast for the first time today,  Plan of Care Reviewed With: patient  Overall Patient Progress: improving  Goal: Patient-Specific Goal (Individualized)  Description: You can add care plan individualizations to a care plan. Examples of Individualization might be:  \"Parent requests to be called daily at 9am for status\", \"I have a hard time hearing out of my right ear\", or \"Do not touch me to wake me up as it startles  me\".  Outcome: Progressing  Goal: Absence of Hospital-Acquired Illness or Injury  Outcome: Progressing  Intervention: Prevent Skin Injury  Recent Flowsheet Documentation  Taken 2/2/2025 1030 by Mine Gamino RN  Body Position: position changed independently  Intervention: Prevent Infection  Recent Flowsheet Documentation  Taken 2/2/2025 1030 by Mine Gamino RN  Infection Prevention:   hand hygiene promoted   rest/sleep promoted   single patient room provided  Goal: Optimal Comfort and Wellbeing  Outcome: Progressing  Intervention: Provide Person-Centered Care  Recent Flowsheet Documentation  Taken 2/2/2025 1030 by Mine Gamino RN  Trust Relationship/Rapport:   care explained   choices provided   emotional support provided   empathic listening provided   questions answered   questions encouraged   reassurance provided   thoughts/feelings acknowledged  Goal: Readiness for Transition of Care  Outcome: Progressing     "

## 2025-02-03 VITALS
RESPIRATION RATE: 18 BRPM | HEART RATE: 79 BPM | OXYGEN SATURATION: 100 % | TEMPERATURE: 98.9 F | DIASTOLIC BLOOD PRESSURE: 72 MMHG | WEIGHT: 198.9 LBS | BODY MASS INDEX: 29.37 KG/M2 | SYSTOLIC BLOOD PRESSURE: 130 MMHG

## 2025-02-03 LAB
CREAT SERPL-MCNC: 0.65 MG/DL (ref 0.51–0.95)
EGFRCR SERPLBLD CKD-EPI 2021: >90 ML/MIN/1.73M2
PLATELET # BLD AUTO: 263 10E3/UL (ref 150–450)

## 2025-02-03 PROCEDURE — 85049 AUTOMATED PLATELET COUNT: CPT | Performed by: OBSTETRICS & GYNECOLOGY

## 2025-02-03 PROCEDURE — 250N000013 HC RX MED GY IP 250 OP 250 PS 637: Performed by: OBSTETRICS & GYNECOLOGY

## 2025-02-03 PROCEDURE — 250N000011 HC RX IP 250 OP 636: Performed by: OBSTETRICS & GYNECOLOGY

## 2025-02-03 PROCEDURE — 82565 ASSAY OF CREATININE: CPT | Performed by: OBSTETRICS & GYNECOLOGY

## 2025-02-03 PROCEDURE — 36415 COLL VENOUS BLD VENIPUNCTURE: CPT | Performed by: OBSTETRICS & GYNECOLOGY

## 2025-02-03 RX ORDER — ENOXAPARIN SODIUM 100 MG/ML
40 INJECTION SUBCUTANEOUS DAILY
Qty: 17 ML | Refills: 0 | Status: SHIPPED | OUTPATIENT
Start: 2025-02-03

## 2025-02-03 RX ADMIN — IBUPROFEN 600 MG: 600 TABLET ORAL at 01:46

## 2025-02-03 RX ADMIN — LEVOTHYROXINE SODIUM 50 MCG: 0.05 TABLET ORAL at 07:44

## 2025-02-03 RX ADMIN — IBUPROFEN 600 MG: 600 TABLET ORAL at 07:45

## 2025-02-03 RX ADMIN — ACETAMINOPHEN 975 MG: 325 TABLET, FILM COATED ORAL at 11:56

## 2025-02-03 RX ADMIN — ENOXAPARIN SODIUM 40 MG: 40 INJECTION SUBCUTANEOUS at 07:42

## 2025-02-03 RX ADMIN — IBUPROFEN 600 MG: 600 TABLET ORAL at 13:29

## 2025-02-03 RX ADMIN — ACETAMINOPHEN 975 MG: 325 TABLET, FILM COATED ORAL at 05:46

## 2025-02-03 RX ADMIN — SENNOSIDES AND DOCUSATE SODIUM 1 TABLET: 50; 8.6 TABLET ORAL at 07:45

## 2025-02-03 ASSESSMENT — ACTIVITIES OF DAILY LIVING (ADL)
ADLS_ACUITY_SCORE: 31
DEPENDENT_IADLS:: INDEPENDENT
ADLS_ACUITY_SCORE: 31
ADLS_ACUITY_SCORE: 31

## 2025-02-03 NOTE — PLAN OF CARE
Goal Outcome Evaluation:           Overall Patient Progress: improvingOverall Patient Progress: improving         Patient's vital signs stable, voiding independently and drinking adequate fluids. Pumping very 3 hours. Walked down to the NICU once last evening before bed to visit baby. Pain being managed with scheduled pain medications.  at bedside, supportive of patient.       Problem: Adult Inpatient Plan of Care  Goal: Plan of Care Review  Description: The Plan of Care Review/Shift note should be completed every shift.  The Outcome Evaluation is a brief statement about your assessment that the patient is improving, declining, or no change.  This information will be displayed automatically on your shift  note.  Outcome: Progressing  Flowsheets (Taken 2/3/2025 0534)  Overall Patient Progress: improving  Goal: Absence of Hospital-Acquired Illness or Injury  Outcome: Progressing  Intervention: Prevent Skin Injury  Recent Flowsheet Documentation  Taken 2/2/2025 2351 by Zeinab Sosa, RN  Body Position: position changed independently  Intervention: Prevent Infection  Recent Flowsheet Documentation  Taken 2/2/2025 2351 by Zeinab Sosa, RN  Infection Prevention:   hand hygiene promoted   rest/sleep promoted   single patient room provided  Goal: Optimal Comfort and Wellbeing  Outcome: Progressing  Intervention: Monitor Pain and Promote Comfort  Recent Flowsheet Documentation  Taken 2/2/2025 2351 by Zeinab Sosa, RN  Pain Management Interventions:   medication (see MAR)   cold applied  Intervention: Provide Person-Centered Care  Recent Flowsheet Documentation  Taken 2/2/2025 2351 by Zeinab Sosa, RN  Trust Relationship/Rapport:   care explained   choices provided   emotional support provided   empathic listening provided   questions answered   questions encouraged   reassurance provided   thoughts/feelings acknowledged  Goal: Readiness for Transition of Care  Outcome: Progressing     Problem:  Postpartum ( Delivery)  Goal: Successful Parent Role Transition  Outcome: Progressing  Intervention: Support Parent Role Transition  Recent Flowsheet Documentation  Taken 2025 by Zeinab Sosa, RN  Supportive Measures:   active listening utilized   decision-making supported   positive reinforcement provided   relaxation techniques promoted   self-care encouraged  Parent-Child Attachment Promotion: (NICU ipad set up, encouraged to visit baby in NICU) other (see comments)  Goal: Hemostasis  Outcome: Progressing  Goal: Effective Bowel Elimination  Outcome: Progressing  Goal: Fluid and Electrolyte Balance  Outcome: Progressing  Goal: Absence of Infection Signs and Symptoms  Outcome: Progressing  Goal: Optimal Pain Control and Function  Outcome: Progressing  Intervention: Prevent or Manage Pain  Recent Flowsheet Documentation  Taken 2025 by Zeinab Sosa, RN  Pain Management Interventions:   medication (see MAR)   cold applied

## 2025-02-03 NOTE — DISCHARGE SUMMARY
[facility] Discharge Summary    Anahi Hannah MRN# [id number]   Age: 44 year old YOB: 1980     Date of Admission:  2025  Date of Discharge::  2/3/2025  Admitting Physician:  Dann Yee MD  Discharge Physician:  Silvia Browne MD     Home clinic: [facility]          Admission Diagnoses:   Pregnancy with history of uterine myomectomy [O34.29]   delivery delivered [O82]  Placenta accreta affecting delivery [O43.219]  Anemia affecting first pregnancy [O99.019]  DVT (deep vein thrombosis) in pregnancy [O22.30]  White classification A1 gestational diabetes mellitus (GDM), diet controlled [O24.410]  Marginal insertion of umbilical cord affecting management of mother [O43.199]  Two vessel umbilical cord, antepartum [O09.899]  Pregnancy resulting from in vitro fertilization  Primigravida of advanced maternal age in third trimester [O09.513]  Hypothyroidism affecting pregnancy in third trimester [O99.283, E03.9]  Anxiety  Depression          Discharge Diagnosis:     Scheduled  section  DVT this pregnancy  GESTATIONAL DIABETES  Mild anemia  Depression/Anxiety          Procedures:     Procedure(s): Primary low transverse  section       No other procedures performed during this admission           Medications Prior to Admission:     Medications Prior to Admission   Medication Sig Dispense Refill Last Dose/Taking    acetone urine (KETOSTIX) test strip Use 1 strip daily (upon awaking for the day) to check urine ketones per  directions. 50 strip 1 2025 Evening    LANsoprazole (PREVACID) 30 MG DR capsule Take 1 capsule by mouth daily. 90 capsule 1 2025 Morning    levothyroxine (SYNTHROID/LEVOTHROID) 50 MCG tablet Take 1 tablet (50 mcg) by mouth daily. 90 tablet 2 2025 Morning    Prenatal MV & Min w/FA-DHA (PRENATAL GUMMIES PO) Take 2 each by mouth daily. Sean Brand   Past Week    Alcohol Swabs (ALCOHOL PREP) 70 %  PADS        blood glucose (NO BRAND SPECIFIED) test strip Use to test blood sugar 4 times daily or as directed. To accompany: Blood Glucose Monitor Brands: per insurance. 150 strip 3     blood glucose monitoring (NO BRAND SPECIFIED) meter device kit Use to test blood sugar 4 times daily or as directed. Preferred blood glucose meter OR supplies to accompany: Blood Glucose Monitor Brands: per insurance. 1 kit 0     Blood Glucose Monitoring Suppl (ACCU-CHEK GUIDE ME) w/Device KIT USE TO TEST BLOOD SUGARS FOUR TIMES DAILY OR AS DIRECTED       thin (NO BRAND SPECIFIED) lancets Use with lancing device to check glucose four times per day. To accompany: Blood Glucose Monitor Brands: per insurance. 200 each 1     [DISCONTINUED] aspirin 81 MG EC tablet Take 81 mg by mouth daily.   2025 Noon    [DISCONTINUED] enoxaparin ANTICOAGULANT (LOVENOX) 40 MG/0.4ML syringe Inject 40 mg subcutaneously daily.   2025    [DISCONTINUED] pyridOXINE (VITAMIN  B-6) 25 MG tablet Take 25 mg by mouth daily   2025             Discharge Medications:     Current Discharge Medication List        CONTINUE these medications which have CHANGED    Details   enoxaparin ANTICOAGULANT (LOVENOX) 40 MG/0.4ML syringe Inject 0.4 mLs (40 mg) subcutaneously daily. For 6 weeks postpartum  Qty: 17 mL, Refills: 0    Associated Diagnoses:  delivery delivered; History of deep vein thrombosis (DVT) during pregnancy           CONTINUE these medications which have NOT CHANGED    Details   acetone urine (KETOSTIX) test strip Use 1 strip daily (upon awaking for the day) to check urine ketones per  directions.  Qty: 50 strip, Refills: 1    Associated Diagnoses: White classification A1 gestational diabetes mellitus (GDM), diet controlled      LANsoprazole (PREVACID) 30 MG DR capsule Take 1 capsule by mouth daily.  Qty: 90 capsule, Refills: 1    Comments: Please do not cancel.  Associated Diagnoses: Gastroesophageal reflux disease without  esophagitis      levothyroxine (SYNTHROID/LEVOTHROID) 50 MCG tablet Take 1 tablet (50 mcg) by mouth daily.  Qty: 90 tablet, Refills: 2    Associated Diagnoses: Female infertility      Prenatal MV & Min w/FA-DHA (PRENATAL GUMMIES PO) Take 2 each by mouth daily. Sean Brand      Alcohol Swabs (ALCOHOL PREP) 70 % PADS       blood glucose (NO BRAND SPECIFIED) test strip Use to test blood sugar 4 times daily or as directed. To accompany: Blood Glucose Monitor Brands: per insurance.  Qty: 150 strip, Refills: 3    Associated Diagnoses: White classification A1 gestational diabetes mellitus (GDM), diet controlled      blood glucose monitoring (NO BRAND SPECIFIED) meter device kit Use to test blood sugar 4 times daily or as directed. Preferred blood glucose meter OR supplies to accompany: Blood Glucose Monitor Brands: per insurance.  Qty: 1 kit, Refills: 0    Associated Diagnoses: White classification A1 gestational diabetes mellitus (GDM), diet controlled      Blood Glucose Monitoring Suppl (ACCU-CHEK GUIDE ME) w/Device KIT USE TO TEST BLOOD SUGARS FOUR TIMES DAILY OR AS DIRECTED      thin (NO BRAND SPECIFIED) lancets Use with lancing device to check glucose four times per day. To accompany: Blood Glucose Monitor Brands: per insurance.  Qty: 200 each, Refills: 1    Associated Diagnoses: White classification A1 gestational diabetes mellitus (GDM), diet controlled           STOP taking these medications       aspirin 81 MG EC tablet Comments:   Reason for Stopping:         pyridOXINE (VITAMIN  B-6) 25 MG tablet Comments:   Reason for Stopping:                     Consultations:   Consultation during this admission received from social work and lactation          Brief History of Labor or Admission:     Anahi Hannah is a 44 year old G1 with an intrauterine pregnancy at 36 2/7 weeks who has had a myomectomy before and is to undergo  section at this time.  Her pregnancy has been complicated by deep venous thrombosis  requiring anticoagulation with Lovenox which has resolved that clot but now she is on prophylactic dosing until 6 weeks postpartum, pregnancy conceived by in vitro fertilization, class A1 gestational diabetes, hypothyroidism on Synthroid, advanced maternal age with normal genetics on the embryo prior to implantation transfer, and also normal level 2 ultrasound with exception of a two-vessel umbilical cord and marginal cord insertion.  The patient has been followed closely by maternal-fetal medicine as well.             Hospital Course:   The patient's hospital course was unremarkable.  She recovered as anticipated and experienced no post-operative complications. On discharge, her pain was well controlled. Vaginal bleeding is similar to peak menstrual flow.  Voiding without difficulty.  Ambulating well and tolerating a normal diet.  No fever or significant wound drainage.  Infant is stable in the NICU. +bowel movement.  She was discharged on post-partum day #3. Plan for 6 weeks of lovenox PP. 1 week follow up for mood check, then routine 6w follow up. She did receive an iron transfusion for anemia postpartum and will continue PO iron at home.     Post-partum hemoglobin:   Hemoglobin   Date Value Ref Range Status   02/01/2025 7.7 (L) 11.7 - 15.7 g/dL Final             Discharge Instructions and Follow-Up:     Discharge diet: Regular   Discharge activity: Activity as tolerated  Your activity upon discharge:   Activity as tolerated  No lifting or strenuous exercise for 6 weeks  No driving for 2 weeks or while on narcotic pain medications  Nothing in the vagina including sex, douching or tampons for 6 weeks    Discharge follow-up: Follow up with Dr. Yee in 1 and 6 weeks   Wound care: Drink plenty of fluids  Ice to area for comfort  Steri-strips off in 5 days           Discharge Disposition:     Discharged to home      Attestation:  I have reviewed today's vital signs, notes, medications, labs and imaging.    Silvia ALAMO  MD [place]

## 2025-02-03 NOTE — DISCHARGE SUMMARY
Vital signs stable. Fundus firm and midline, scant lochia. Ambulating independently, denies dizziness. Voiding spontaneously, passing flatus. Reports pain is well controlled with ibuprofen and tylenol. Abdominal incision is approximated with steri strips, scant dried drainage noted. No signs of infection in surrounding tissue. Pumping every 3 hours. Speaks positively of infant in NICU and viewing on iPad.    Discharge instructions completed.  Patient states she understands all discharge instructions and all of her questions have been answered.  Patient verbalizes when she needs to return to clinic for follow up for herself and baby.  She is caring for herself and her baby independently.  Prescriptions filled and given to patient/family.  Postpartum depression symptoms reviewed and encouraged frequent review of depression scale; plan for mood check in 1 week. Breast pump at home. Patient discharged with spouse.

## 2025-02-03 NOTE — DISCHARGE INSTRUCTIONS
Warning Signs after Having a Baby    Keep this paper on your fridge or somewhere else where you can see it.    Call your provider if you have any of these symptoms up to 12 weeks after having your baby.    Thoughts of hurting yourself or your baby  Pain in your chest or trouble breathing  Severe headache not helped by pain medicine  Eyesight concerns (blurry vision, seeing spots or flashes of light, other changes to eyesight)  Fainting, shaking or other signs of a seizure    Call 9-1-1 if you feel that it is an emergency.     The symptoms below can happen to anyone after giving birth. They can be very serious. Call your provider if you have any of these warning signs.    My provider s phone number: _______________________    Losing too much blood (hemorrhage)    Call your provider if you soak through a pad in less than an hour or pass blood clots bigger than a golf ball. These may be signs that you are bleeding too much.    Blood clots in the legs or lungs    After you give birth, your body naturally clots its blood to help prevent blood loss. Sometimes this increased clotting can happen in other areas of the body, like the legs or lungs. This can block your blood flow and be very dangerous.     Call your provider if you:  Have a red, swollen spot on the back of your leg that is warm or painful when you touch it.   Are coughing up blood.     Infection    Call your provider if you have any of these symptoms:  Fever of 100.4 F (38 C) or higher.  Pain or redness around your stitches if you had an incision.   Any yellow, white, or green fluid coming from places where you had stitches or surgery.    Mood Problems (postpartum depression)    Many people feel sad or have mood changes after having a baby. But for some people, these mood swings are worse.     Call your provider right away if you feel so anxious or nervous that you can't care for yourself or your baby.    Preeclampsia (high blood pressure)    Even if you  didn't have high blood pressure when you were pregnant, you are at risk for the high blood pressure disease called preeclampsia. This risk can last up to 12 weeks after giving birth.     Call your provider if you have:   Pain on your right side under your rib cage  Sudden swelling in the hands and face    Remember: You know your body. If something doesn't feel right, get medical help.     For informational purposes only. Not to replace the advice of your health care provider. Copyright 2020 [facility]. All rights reserved. Clinically reviewed by Liberty Finley, RNC-OB, MSN. [employer] [id number] - Rev 02/23.

## 2025-02-04 ENCOUNTER — LACTATION ENCOUNTER (OUTPATIENT)
Dept: OTHER | Facility: CLINIC | Age: 45
End: 2025-02-04

## 2025-02-04 PROCEDURE — 88307 TISSUE EXAM BY PATHOLOGIST: CPT | Mod: 26 | Performed by: PATHOLOGY

## 2025-02-04 NOTE — LACTATION NOTE
"This note was copied from a baby's chart.  Lactation visit for breastfeed. Anahi reports she got 10 ml with this morning pump session- day 4. Reviewed when and how to switch to maintain mode.  Imtiaz alert and vigorously cueing. Suggested trying football hold- mother agrees. Imtiaz fussy and unable to grasp nipple without shield- shield then utilized and easily achieved deep latch. Reinforced deep latch techniques. Anahi \"jumpy\" at first with latch but demonstrated good relaxation breathing and was better throughout breastfeed. Reviewed breast compressions during feed. Imtiaz will take 2-4 sucks and then have long pauses and needing to be restimulated. Did have a couple bursts of long nutritive (2-3) sucks with occasional swallows. Sleepy after 5-6 minutes and had very brief desat of 89-90% which quickly self resolved. Discussed reading infant cues at breast.   Scale indicating transfer of 0 ml but praise provided for breastfeeding session and sustaining latch.  All questions answered. Support and encouragement provided. Primary RN updated on visit.   "

## 2025-02-05 ENCOUNTER — LACTATION ENCOUNTER (OUTPATIENT)
Dept: OTHER | Facility: CLINIC | Age: 45
End: 2025-02-05

## 2025-02-05 ENCOUNTER — PATIENT OUTREACH (OUTPATIENT)
Dept: CARE COORDINATION | Facility: CLINIC | Age: 45
End: 2025-02-05
Payer: COMMERCIAL

## 2025-02-05 NOTE — LACTATION NOTE
This note was copied from a baby's chart.  Lactation visit- Anahi has questions regarding home pump. Using spectra pump at home and states not getting much but is pumping several mls with hospital grade pump in NICU.   Using 21 mm flanges with both pumps. Reinforced switching to maintain mode tomorrow which is day 6.   Reviewed spectra pump set up- using pieces correctly but did not know how to use pump settings. Reviewed settings and how to utilize. Using lanolin cream at home- mothers love given and suggested using instead of lanolin.   All questions answered.

## 2025-02-05 NOTE — PROGRESS NOTES
[facility] Contact  [facility]/Voicemail     Clinical Data: Post-Discharge Outreach     Outreach attempted x 2.  Left message on patient's voicemail, providing [facility]'s central phone number of [phone] ([phone]) for questions/concerns and/or to schedule an appt with an [facility] provider, if they do not have a PCP.      Plan:  [facility] will do no further outreaches at this time.     RAYMOND Alcantar  [phone]  [facility]     *[facility] Care Resource Team does NOT follow patient ongoing. Referrals are identified based on internal discharge reports and the outreach is to ensure patient has an understanding of their discharge instructions.

## 2025-02-07 ENCOUNTER — TELEPHONE (OUTPATIENT)
Dept: OBGYN | Facility: CLINIC | Age: 45
End: 2025-02-07

## 2025-02-07 NOTE — TELEPHONE ENCOUNTER
Spoke with pt-     Advised if steri strips have not come off in 5-7 days she can take them off.     Pt also wondering if biotin is safe to take while breastfeeding- advised not enough studies have been done on use of product with breastfeeding mothers so cannot safely advise on use.     Pt also wanting order for hospital grade pump rental- advised will place order today but due to end of clinic day may have to wait until Monday for order to be signed and processed. She is ok with that and will reach out to DME on Monday and alert us to any issues.     Breast pump order faxed to [employer] [facility].     ORA [place] JULIA

## 2025-02-07 NOTE — TELEPHONE ENCOUNTER
[facility] Call Center    Phone Message    May a detailed message be left on voicemail: yes     Reason for Call: Other: Patient is requesting to order for hospital grade breast pump. Please call patient back once order has been placed- [phone]     Action Taken: Other: [facility]    Travel Screening: Not Applicable     Date of Service:

## 2025-02-10 ENCOUNTER — LACTATION ENCOUNTER (OUTPATIENT)
Dept: OTHER | Facility: CLINIC | Age: 45
End: 2025-02-10

## 2025-02-10 NOTE — LACTATION NOTE
This note was copied from a baby's chart.  Lactation Visit: Infant is discharging today. Anahi is concerned because she is still struggling with her milk supply. Anahi plans to  the hospital grade rental today as she sees a small increase in production when she uses that. She has been mostly pumping every 3 hours but not always per her report, she has not started the power pumping sessions yet. Reviewed the importance of frequent stimulation of breasts. Encouraged her to add in the power pumping session once a day. Encouraged her to make an appointment with her OB to review her labs as well as a breast tissue assessment and to discuss any potential medication or supplements. Marilyn breasts are coned shaped, small and far apart . Currently she has been able to pump 8-10 ml per session. Mother was emotional. Encouraged her and answered questions. Reviewed that the most important thing is that infant is fed and how that looks is different for every family.

## 2025-02-26 ENCOUNTER — OFFICE VISIT (OUTPATIENT)
Dept: DERMATOLOGY | Facility: CLINIC | Age: 45
End: 2025-02-26
Attending: NURSE PRACTITIONER
Payer: COMMERCIAL

## 2025-02-26 DIAGNOSIS — L82.0 INFLAMED SEBORRHEIC KERATOSIS: ICD-10-CM

## 2025-02-26 DIAGNOSIS — D18.01 CHERRY ANGIOMA: ICD-10-CM

## 2025-02-26 DIAGNOSIS — D22.9 MULTIPLE BENIGN NEVI: Primary | ICD-10-CM

## 2025-02-26 DIAGNOSIS — L81.4 LENTIGINES: ICD-10-CM

## 2025-02-26 DIAGNOSIS — L82.1 SEBORRHEIC KERATOSES: ICD-10-CM

## 2025-02-26 DIAGNOSIS — Z12.83 ENCOUNTER FOR SCREENING FOR MALIGNANT NEOPLASM OF SKIN: ICD-10-CM

## 2025-02-26 NOTE — LETTER
2/26/2025      Anahi Hannah  [address] [facility] [address]      Dear Colleague,    Thank you for referring your patient, Anahi Hannah, [facility]. Please see a copy of my visit note below.    [facility] Dermatology Note  Encounter Date: Feb 26, 2025  Office Visit     Reviewed patients past medical history and pertinent chart review prior to patients visit today.     Dermatology Problem List:  ISK, cryo 2/21/2024, 2/26/2025   Hair loss, topical minoxidil recommended 2/21/24    Patient denies personal history of skin cancer or dysplastic nevi.   Patient denies family history of skin cancer or dysplastic nevi.      Has a 3 week old baby boy    ____________________________________________    Assessment & Plan:     # Irritated and inflamed Seborrheic Keratosis. Discussed treatment options with patient including no treatment, cryotherapy, and shave removal. Patient prefers cryotherapy today due to irritation and itching. After verbal consent and discussion of risks and benefits including but no limited to dyspigmentation/scar, blister, and pain, 20 was(were) treated with 1-2mm freeze border for 2 cycles with liquid nitrogen. Post cryotherapy instructions were provided.       # Benign skin findings including: seborrheic keratoses, cherry angioma, lentigines and benign nevi.   - No further intervention required. Patient to report changes.   - Patient reassured of the benign nature of these lesions.    #Signs and Symptoms of non-melanoma skin cancer and ABCDEs of melanoma reviewed with patient. Patient encouraged to perform monthly self skin exams and educated on how to perform them. UV precautions reviewed with patient. Patient was asked about new or changing moles/lesions on body.     #Reviewed Sunscreen: Apply 20 minutes prior to going outdoors and reapply every two hours, when wet or sweating. We recommend using an SPF 30 or higher, and to  use one that is water resistant.       Follow-up:  1-2 years for follow up full body skin exam, prn for new or changing lesions or new concerns    Donna Blas CNP  Dermatology     ____________________________________________    CC: Skin Check (FBSC/Concerns: none)    HPI:  Ms. Anahi Hannah is a(n) 44 year old female who presents today as a return patient for a full body skin cancer screening.  She has many irritated and inflamed seborrheic keratoses on her neckline, right thigh, right hip and back area that she would like treated with cryotherapy today most of them have improved or resolved that we treated last time but she thinks she got many more over the last year.    Patient is otherwise feeling well, without additional skin concerns.     Physical Exam:  Vitals: LMP  (LMP Unknown)   SKIN: Total skin excluding the genitalia areas was performed. The exam included the head/face, neck, both arms, chest, back, abdomen, both legs, digits, mons pubis, buttock and nails.   -Very thin and sparse growth of hair on the vertex and frontal scalp.  Thin hair throughout the rest of the scalp as well but most significant on the vertex and frontal  -several 1-2mm red dome shaped symmetric papules scattered on the trunk  -multiple tan/brown flat round macules and raised papules scattered throughout trunk, extremities and head. No worrisome features for malignancy noted on examination.  -scattered tan, homogenous macules scattered on sun exposed areas of trunk, extremities and face.   -scattered waxy, stuck on tan/brown papules and patches on the trunk, neckline, extremities and scalp and right cheek  - No other lesions of concern on areas examined.     Medications:  Current Outpatient Medications   Medication Sig Dispense Refill     Alcohol Swabs (ALCOHOL PREP) 70 % PADS        blood glucose (NO BRAND SPECIFIED) test strip Use to test blood sugar 4 times daily or as directed. To accompany: Blood Glucose Monitor Brands: per  insurance. 150 strip 3     Blood Glucose Monitoring Suppl (ACCU-CHEK GUIDE ME) w/Device KIT USE TO TEST BLOOD SUGARS FOUR TIMES DAILY OR AS DIRECTED       enoxaparin ANTICOAGULANT (LOVENOX) 40 MG/0.4ML syringe Inject 0.4 mLs (40 mg) subcutaneously daily. For 6 weeks postpartum 17 mL 0     LANsoprazole (PREVACID) 30 MG DR capsule Take 1 capsule by mouth daily. 90 capsule 1     levothyroxine (SYNTHROID/LEVOTHROID) 50 MCG tablet Take 1 tablet (50 mcg) by mouth daily. 90 tablet 2     Prenatal MV & Min w/FA-DHA (PRENATAL GUMMIES PO) Take 2 each by mouth daily. Sean Brand       thin (NO BRAND SPECIFIED) lancets Use with lancing device to check glucose four times per day. To accompany: Blood Glucose Monitor Brands: per insurance. 200 each 1     acetone urine (KETOSTIX) test strip Use 1 strip daily (upon awaking for the day) to check urine ketones per  directions. 50 strip 1     blood glucose monitoring (NO BRAND SPECIFIED) meter device kit Use to test blood sugar 4 times daily or as directed. Preferred blood glucose meter OR supplies to accompany: Blood Glucose Monitor Brands: per insurance. 1 kit 0     No current facility-administered medications for this visit.      Past Medical History:   Patient Active Problem List   Diagnosis     Gastroesophageal reflux disease without esophagitis     Female infertility     Uterine leiomyoma, unspecified location     History of colposcopy with cervical biopsy     High-tone pelvic floor dysfunction in female     Pelvic somatic dysfunction     Dyspareunia in female     Anxiety     Kidney stone     Migraine headache     Pap smear abnormality of cervix     DVT (deep venous thrombosis) (H)     Pregnancy with history of uterine myomectomy     DVT (deep vein thrombosis) in pregnancy     Pregnancy resulting from in vitro fertilization     Advanced maternal age, 1st pregnancy     Factor 5 Leiden mutation, heterozygous     Hypothyroidism affecting pregnancy     Family history of  hypercoagulable state     Two vessel umbilical cord, antepartum     Marginal insertion of umbilical cord affecting management of mother     White classification A1 gestational diabetes mellitus (GDM), diet controlled     Other hydronephrosis     Anemia affecting first pregnancy     Hydronephrosis      delivery delivered     Placenta accreta affecting delivery     History of deep vein thrombosis (DVT) during pregnancy     Gestational diabetes mellitus (GDM), delivered, current hospitalization     Gestational diabetes mellitus, delivered, current hospitalization     Pregnancy resulting from in vitro fertilization in third trimester     Hypothyroidism affecting pregnancy in third trimester     Primigravida of advanced maternal age in third trimester     Past Medical History:   Diagnosis Date     Anemia in pregnancy       delivery delivered 2025     Crushing injury of ankle and foot      DVT (deep vein thrombosis) in pregnancy 2024     DVT (deep venous thrombosis) (H) 07/10/2024     Early menopause      Factor 5 Leiden mutation, heterozygous 2024     Fibroid uterus 2023    large fibroid removed at [facility]     Gastroesophageal reflux disease without esophagitis      Gestational diabetes      History of colposcopy with cervical biopsy     pos high risk HPV     History of deep vein thrombosis (DVT) during pregnancy 2025     Pelvic floor dysfunction      Placenta accreta affecting delivery 2025     Pneumonia of right lower lobe due to infectious organism      PONV (postoperative nausea and vomiting)      Tension headache        CC Referred Self, MD  No address on file on close of this encounter.      Again, thank you for allowing me to participate in the care of your patient.        Sincerely,        BING Plunkett CNP    Electronically signed

## 2025-02-26 NOTE — PATIENT INSTRUCTIONS
Proper skin care from [facility]:    -Eliminate harsh soaps as they strip the natural oils from the skin, often resulting in dry itchy skin ( i.e. Dial, Zest, [place] Spring)  -Use mild soaps such as Cetaphil or Dove Sensitive Skin in the shower. You do not need to use soap on arms, legs, and trunk every time you shower unless visibly soiled.   -Avoid hot or cold showers.  -After showering, lightly dry off and apply moisturizing within 2-3 minutes. This will help trap moisture in the skin.   -Aggressive use of a moisturizer at least 1-2 times a day to the entire body (including -Vanicream, Cetaphil, Aquaphor or Cerave) and moisturize hands after every washing.  -We recommend using moisturizers that come in a tub that needs to be scooped out, not a pump. This has more of an oil base. It will hold moisture in your skin much better than a water base moisturizer. The above recommended are non-pore clogging.      Wear a sunscreen with at least SPF 30 on your face, ears, neck and V of the chest daily. Wear sunscreen on other areas of the body if those areas are exposed to the sun throughout the day. Sunscreens can contain physical and/or chemical blockers. Physical blockers are less likely to clog pores, these include zinc oxide and titanium dioxide. Reapply every two hour and after swimming.     Sunscreen examples: https://www.ewg.org/sunscreen/    UV radiation  UVA radiation remains constant throughout the day and throughout the year. It is a longer wavelength than UVB and therefore penetrates deeper into the skin leading to immediate and delayed tanning, photoaging, and skin cancer. 70-80% of UVA and UVB radiation occurs between the hours of 10am-2pm.  UVB radiation  UVB radiation causes the most harmful effects and is more significant during the summer months. However, snow and ice can reflect UVB radiation leading to skin damage during the winter months as well. UVB radiation is responsible for tanning,  burning, inflammation, delayed erythema (pinkness), pigmentation (brown spots), and skin cancer.     I recommend self monthly full body exams and yearly full body exams with a dermatology provider. If you develop a new or changing lesion please follow up for examination. Most skin cancers are pink and scaly or pink and pearly. However, we do see blue/brown/black skin cancers.  Consider the ABCDEs of melanoma when giving yourself your monthly full body exam ( don't forget the groin, buttocks, feet, toes, etc). A-asymmetry, B-borders, C-color, D-diameter, E-elevation or evolving. If you see any of these changes please follow up in clinic. If you cannot see your back I recommend purchasing a hand held mirror to use with a larger wall mirror.       Checking for Skin Cancer  You can find cancer early by checking your skin each month. There are 3 kinds of skin cancer. They are melanoma, basal cell carcinoma, and squamous cell carcinoma. Doing monthly skin checks is the best way to find new marks or skin changes. Follow the instructions below for checking your skin.   The ABCDEs of checking moles for melanoma   Check your moles or growths for signs of melanoma using ABCDE:   Asymmetry: the sides of the mole or growth don t match  Border: the edges are ragged, notched, or blurred  Color: the color within the mole or growth varies  Diameter: the mole or growth is larger than 6 mm (size of a pencil eraser)  Evolving: the size, shape, or color of the mole or growth is changing (evolving is not shown in the images below)    Checking for other types of skin cancer  Basal cell carcinoma or squamous cell carcinoma have symptoms such as:     A spot or mole that looks different from all other marks on your skin  Changes in how an area feels, such as itching, tenderness, or pain  Changes in the skin's surface, such as oozing, bleeding, or scaliness  A sore that does not heal  New swelling or redness beyond the border of a  mole    Who s at risk?  Anyone can get skin cancer. But you are at greater risk if you have:   Fair skin, light-colored hair, or light-colored eyes  Many moles or abnormal moles on your skin  A history of sunburns from sunlight or tanning beds  A family history of skin cancer  A history of exposure to radiation or chemicals  A weakened immune system  If you have had skin cancer in the past, you are at risk for recurring skin cancer.   How to check your skin  Do your monthly skin checkups in front of a full-length mirror. Check all parts of your body, including your:   Head (ears, face, neck, and scalp)  Torso (front, back, and sides)  Arms (tops, undersides, upper, and lower armpits)  Hands (palms, backs, and fingers, including under the nails)  Buttocks and genitals  Legs (front, back, and sides)  Feet (tops, soles, toes, including under the nails, and between toes)  If you have a lot of moles, take digital photos of them each month. Make sure to take photos both up close and from a distance. These can help you see if any moles change over time.   Most skin changes are not cancer. But if you see any changes in your skin, call your doctor right away. Only he or she can diagnose a problem. If you have skin cancer, seeing your doctor can be the first step toward getting the treatment that could save your life.   [employer] last reviewed this educational content on 4/1/2019 2000-2020 [employer]. [address] [place] [address], [place] [address]. All rights reserved. This information is not intended as a substitute for professional medical care. Always follow your healthcare professional's instructions.       When should I call my doctor?  If you are worsening or not improving, please, contact us or seek urgent care as noted below.     Who should I call with questions (adults)?    [facility] [phone]  For urgent needs outside of business hours call the [facility] at  [phone] and ask for the dermatology resident on call to be paged  If this is a medical emergency and you are unable to reach an ER, Call [phone]      If you need a prescription refill, please contact your pharmacy. Refills are approved or denied by our Physicians during normal business hours, Monday through Friday.  Per office policy, refills will not be granted if you have not been seen within the past year (or sooner depending on the condition).

## 2025-02-26 NOTE — PROGRESS NOTES
[facility] Dermatology Note  Encounter Date: Feb 26, 2025  Office Visit     Reviewed patients past medical history and pertinent chart review prior to patients visit today.     Dermatology Problem List:  ISK, cryo 2/21/2024, 2/26/2025   Hair loss, topical minoxidil recommended 2/21/24    Patient denies personal history of skin cancer or dysplastic nevi.   Patient denies family history of skin cancer or dysplastic nevi.      Has a 3 week old baby boy    ____________________________________________    Assessment & Plan:     # Irritated and inflamed Seborrheic Keratosis. Discussed treatment options with patient including no treatment, cryotherapy, and shave removal. Patient prefers cryotherapy today due to irritation and itching. After verbal consent and discussion of risks and benefits including but no limited to dyspigmentation/scar, blister, and pain, 20 was(were) treated with 1-2mm freeze border for 2 cycles with liquid nitrogen. Post cryotherapy instructions were provided.       # Benign skin findings including: seborrheic keratoses, cherry angioma, lentigines and benign nevi.   - No further intervention required. Patient to report changes.   - Patient reassured of the benign nature of these lesions.    #Signs and Symptoms of non-melanoma skin cancer and ABCDEs of melanoma reviewed with patient. Patient encouraged to perform monthly self skin exams and educated on how to perform them. UV precautions reviewed with patient. Patient was asked about new or changing moles/lesions on body.     #Reviewed Sunscreen: Apply 20 minutes prior to going outdoors and reapply every two hours, when wet or sweating. We recommend using an SPF 30 or higher, and to use one that is water resistant.       Follow-up:  1-2 years for follow up full body skin exam, prn for new or changing lesions or new concerns    Donna Blas CNP  Dermatology     ____________________________________________    CC: Skin Check  (FBSC/Concerns: none)    HPI:  Ms. Anahi Hannah is a(n) 44 year old female who presents today as a return patient for a full body skin cancer screening.  She has many irritated and inflamed seborrheic keratoses on her neckline, right thigh, right hip and back area that she would like treated with cryotherapy today most of them have improved or resolved that we treated last time but she thinks she got many more over the last year.    Patient is otherwise feeling well, without additional skin concerns.     Physical Exam:  Vitals: LMP  (LMP Unknown)   SKIN: Total skin excluding the genitalia areas was performed. The exam included the head/face, neck, both arms, chest, back, abdomen, both legs, digits, mons pubis, buttock and nails.   -Very thin and sparse growth of hair on the vertex and frontal scalp.  Thin hair throughout the rest of the scalp as well but most significant on the vertex and frontal  -several 1-2mm red dome shaped symmetric papules scattered on the trunk  -multiple tan/brown flat round macules and raised papules scattered throughout trunk, extremities and head. No worrisome features for malignancy noted on examination.  -scattered tan, homogenous macules scattered on sun exposed areas of trunk, extremities and face.   -scattered waxy, stuck on tan/brown papules and patches on the trunk, neckline, extremities and scalp and right cheek  - No other lesions of concern on areas examined.     Medications:  Current Outpatient Medications   Medication Sig Dispense Refill    Alcohol Swabs (ALCOHOL PREP) 70 % PADS       blood glucose (NO BRAND SPECIFIED) test strip Use to test blood sugar 4 times daily or as directed. To accompany: Blood Glucose Monitor Brands: per insurance. 150 strip 3    Blood Glucose Monitoring Suppl (ACCU-CHEK GUIDE ME) w/Device KIT USE TO TEST BLOOD SUGARS FOUR TIMES DAILY OR AS DIRECTED      enoxaparin ANTICOAGULANT (LOVENOX) 40 MG/0.4ML syringe Inject 0.4 mLs (40 mg)  subcutaneously daily. For 6 weeks postpartum 17 mL 0    LANsoprazole (PREVACID) 30 MG DR capsule Take 1 capsule by mouth daily. 90 capsule 1    levothyroxine (SYNTHROID/LEVOTHROID) 50 MCG tablet Take 1 tablet (50 mcg) by mouth daily. 90 tablet 2    Prenatal MV & Min w/FA-DHA (PRENATAL GUMMIES PO) Take 2 each by mouth daily. Sean Brand      thin (NO BRAND SPECIFIED) lancets Use with lancing device to check glucose four times per day. To accompany: Blood Glucose Monitor Brands: per insurance. 200 each 1    acetone urine (KETOSTIX) test strip Use 1 strip daily (upon awaking for the day) to check urine ketones per  directions. 50 strip 1    blood glucose monitoring (NO BRAND SPECIFIED) meter device kit Use to test blood sugar 4 times daily or as directed. Preferred blood glucose meter OR supplies to accompany: Blood Glucose Monitor Brands: per insurance. 1 kit 0     No current facility-administered medications for this visit.      Past Medical History:   Patient Active Problem List   Diagnosis    Gastroesophageal reflux disease without esophagitis    Female infertility    Uterine leiomyoma, unspecified location    History of colposcopy with cervical biopsy    High-tone pelvic floor dysfunction in female    Pelvic somatic dysfunction    Dyspareunia in female    Anxiety    Kidney stone    Migraine headache    Pap smear abnormality of cervix    DVT (deep venous thrombosis) (H)    Pregnancy with history of uterine myomectomy    DVT (deep vein thrombosis) in pregnancy    Pregnancy resulting from in vitro fertilization    Advanced maternal age, 1st pregnancy    Factor 5 Leiden mutation, heterozygous    Hypothyroidism affecting pregnancy    Family history of hypercoagulable state    Two vessel umbilical cord, antepartum    Marginal insertion of umbilical cord affecting management of mother    White classification A1 gestational diabetes mellitus (GDM), diet controlled    Other hydronephrosis    Anemia affecting  first pregnancy    Hydronephrosis     delivery delivered    Placenta accreta affecting delivery    History of deep vein thrombosis (DVT) during pregnancy    Gestational diabetes mellitus (GDM), delivered, current hospitalization    Gestational diabetes mellitus, delivered, current hospitalization    Pregnancy resulting from in vitro fertilization in third trimester    Hypothyroidism affecting pregnancy in third trimester    Primigravida of advanced maternal age in third trimester     Past Medical History:   Diagnosis Date    Anemia in pregnancy      delivery delivered 2025    Crushing injury of ankle and foot     DVT (deep vein thrombosis) in pregnancy 2024    DVT (deep venous thrombosis) (H) 07/10/2024    Early menopause     Factor 5 Leiden mutation, heterozygous 2024    Fibroid uterus 2023    large fibroid removed at [place]    Gastroesophageal reflux disease without esophagitis     Gestational diabetes     History of colposcopy with cervical biopsy     pos high risk HPV    History of deep vein thrombosis (DVT) during pregnancy 2025    Pelvic floor dysfunction     Placenta accreta affecting delivery 2025    Pneumonia of right lower lobe due to infectious organism     PONV (postoperative nausea and vomiting)     Tension headache        CC Referred Self, MD  No address on file on close of this encounter.

## 2025-02-28 ENCOUNTER — TRANSFERRED RECORDS (OUTPATIENT)
Dept: HEALTH INFORMATION MANAGEMENT | Facility: CLINIC | Age: 45
End: 2025-02-28
Payer: COMMERCIAL

## 2025-03-03 ENCOUNTER — TELEPHONE (OUTPATIENT)
Dept: OBGYN | Facility: CLINIC | Age: 45
End: 2025-03-03
Payer: COMMERCIAL

## 2025-03-03 NOTE — TELEPHONE ENCOUNTER
Caller reporting the following red-flag symptom(s): Pt called with concerns about her  incision/scar. States that one side has opened up about an inch but isn't bleeding or oozing. There is some yellowish mucus (?) not pus. Pt is wondering if  she should be seen    Per the system red-flag symptom policy, patient was instructed to:  speak with a Registered Nurse    Action:  ORA Kaur wanted to call pt back because she wanted to look at possibly getting pt in soon. Writer informed pt that RN would contact pt.

## 2025-03-03 NOTE — TELEPHONE ENCOUNTER
Had  section on 25    Incision has opened a little. Was open a small amount and now pt feel a like it has increased to about an inch in length. Feels like there may be a suture poking through on the right side.     Not oozing, has scant amount of bleeding at times. Happens with increased movement or after getting it wet.   No active bleeding.    Denies any redness around incision or fever.    Appt made with Dr Yee on 3/4 for incision check.     Advised on symptoms to immediatly report or be seen in ED for.    Natasha MAI RN  OB/GYN [place]

## 2025-03-04 ENCOUNTER — OFFICE VISIT (OUTPATIENT)
Dept: OBGYN | Facility: CLINIC | Age: 45
End: 2025-03-04
Payer: COMMERCIAL

## 2025-03-04 VITALS — BODY MASS INDEX: 28.6 KG/M2 | SYSTOLIC BLOOD PRESSURE: 118 MMHG | DIASTOLIC BLOOD PRESSURE: 82 MMHG | WEIGHT: 193.7 LBS

## 2025-03-04 PROCEDURE — 3079F DIAST BP 80-89 MM HG: CPT | Performed by: OBSTETRICS & GYNECOLOGY

## 2025-03-04 PROCEDURE — 3074F SYST BP LT 130 MM HG: CPT | Performed by: OBSTETRICS & GYNECOLOGY

## 2025-03-04 PROCEDURE — 99212 OFFICE O/P EST SF 10 MIN: CPT | Performed by: OBSTETRICS & GYNECOLOGY

## 2025-03-04 NOTE — NURSING NOTE
"Chief Complaint   Patient presents with    Postpartum Care     Incision check   C/S 25         Initial /82 (BP Location: Right arm, Cuff Size: Adult Regular)   Wt 87.9 kg (193 lb 11.2 oz)   LMP  (LMP Unknown)   Breastfeeding No   BMI 28.60 kg/m   Estimated body mass index is 28.6 kg/m  as calculated from the following:    Height as of 24: 1.753 m (5' 9\").    Weight as of this encounter: 87.9 kg (193 lb 11.2 oz).  BP completed using cuff size: regular    Questioned patient about current smoking habits.  Pt. has never smoked.          The following HM Due: NONE        Aline Solano, CMA on 3/4/2025 at 3:10 PM   "

## 2025-03-04 NOTE — PROGRESS NOTES
"  Assessment & Plan     Separation of  wound with drainage, postpartum  - Likely due to seroma that had to drain, no sx's infection  - Limited to 2 cm of skin and subcutaneous layer very superficially just below dermis  - Pt to keep clean and dry, change dressing BID  - Pt to remove steristrip in 1 week, or sooner if any new redness or pus noted  - RTC 1 wk for follow-up if incision doesn't appear to be healing well, o/w in 2 weeks for PP check as scheduled.        BMI  Estimated body mass index is 28.6 kg/m  as calculated from the following:    Height as of 24: 1.753 m (5' 9\").    Weight as of this encounter: 87.9 kg (193 lb 11.2 oz).             No follow-ups on file.    Karla Christian is a 44 year old, presenting for the following health issues:  Postpartum Care (Incision check   C/S 25  )    Pt here for follow-up after primary LTCS 2025 for separation of left side of incision she noted shortly after the steristrips were remove 1 wk postop, but she never came to see anyone for this. No redness around it. No F/C. No severe pain. Drains small amount clear fluid but no pus.                        Objective    /82 (BP Location: Right arm, Cuff Size: Adult Regular)   Wt 87.9 kg (193 lb 11.2 oz)   LMP  (LMP Unknown)   Breastfeeding No   BMI 28.60 kg/m    Body mass index is 28.6 kg/m .  Physical Exam  Abdominal:          Comments: Above-noted area cleaned w/ peroxide, then reapproximated w/ 1/2-in steristrip and covered w/ gauze                    Signed Electronically by: Dann Yee MD    "

## 2025-03-12 ENCOUNTER — OFFICE VISIT (OUTPATIENT)
Dept: OBGYN | Facility: CLINIC | Age: 45
End: 2025-03-12
Payer: COMMERCIAL

## 2025-03-12 ENCOUNTER — TELEPHONE (OUTPATIENT)
Dept: WOUND CARE | Facility: CLINIC | Age: 45
End: 2025-03-12

## 2025-03-12 VITALS — DIASTOLIC BLOOD PRESSURE: 82 MMHG | SYSTOLIC BLOOD PRESSURE: 110 MMHG

## 2025-03-12 DIAGNOSIS — B37.2 YEAST INFECTION OF THE SKIN: ICD-10-CM

## 2025-03-12 PROCEDURE — 3074F SYST BP LT 130 MM HG: CPT | Performed by: OBSTETRICS & GYNECOLOGY

## 2025-03-12 PROCEDURE — 3079F DIAST BP 80-89 MM HG: CPT | Performed by: OBSTETRICS & GYNECOLOGY

## 2025-03-12 PROCEDURE — 99213 OFFICE O/P EST LOW 20 MIN: CPT | Mod: 24 | Performed by: OBSTETRICS & GYNECOLOGY

## 2025-03-12 RX ORDER — NYSTATIN 100000 U/G
CREAM TOPICAL 2 TIMES DAILY
Qty: 15 G | Refills: 0 | Status: SHIPPED | OUTPATIENT
Start: 2025-03-12

## 2025-03-12 NOTE — PROGRESS NOTES
"  Assessment & Plan     Separation of  wound with drainage, postpartum  - Has not healed much after last visit  - Will have Pt keep wound clean, dry, and covered w/ gauze.  - Wound Care Referral; Future  - RTC 1 wk for routine PP check and will reexamine wound again    Yeast infection of the skin  - Area of erythema appears c/w slight skin yeast  - Rx sent to pharm nystatin (MYCOSTATIN) 748005 UNIT/GM external cream; Apply topically 2 times daily.  - Wound Care Referral; Future          BMI  Estimated body mass index is 28.6 kg/m  as calculated from the following:    Height as of 24: 1.753 m (5' 9\").    Weight as of 3/4/25: 87.9 kg (193 lb 11.2 oz).             No follow-ups on file.    Karla Christian is a 44 year old, presenting for the following health issues:  Wound Check (Patient had  on 25. Has been having problems with her incision not healing. Pt states she has some minor drainage, but no pus, greenish discharge or bleeding. )    Pt here for follow-up of wound separation after primary LTCS 2025. Separation of left side of incision was noted shortly after the steristrips were remove 1 wk postop. I saw her 8 days ago and cleaned the area and applied a steristrip to see if it would reapproximate, but the steristrip came off and the area remained open. Also she noted some redness to the right of the opening.                        Objective    /82   LMP  (LMP Unknown)   Breastfeeding No   There is no height or weight on file to calculate BMI.  Physical Exam  Abdominal:                        Signed Electronically by: Dann Yee MD    "

## 2025-03-12 NOTE — NURSING NOTE
"Chief Complaint   Patient presents with    Wound Check     Patient had  on 25. Has been having problems with her incision not healing. Pt states she has some minor drainage, but no pus, greenish discharge or bleeding.        Initial /82   LMP  (LMP Unknown)   Breastfeeding No  Estimated body mass index is 28.6 kg/m  as calculated from the following:    Height as of 24: 1.753 m (5' 9\").    Weight as of 3/4/25: 87.9 kg (193 lb 11.2 oz).  BP completed using cuff size: large    Questioned patient about current smoking habits.  Pt. has never smoked.          The following HM Due: NONE    Otilia Villareal CMA               "

## 2025-03-14 PROBLEM — L98.492 SKIN ULCER OF ABDOMEN WITH FAT LAYER EXPOSED (H): Status: ACTIVE | Noted: 2025-03-14

## 2025-03-14 PROBLEM — R10.0 SURGICAL ABDOMEN: Status: ACTIVE | Noted: 2025-03-14

## 2025-03-14 ASSESSMENT — EDINBURGH POSTNATAL DEPRESSION SCALE (EPDS)
I HAVE BEEN SO UNHAPPY THAT I HAVE BEEN CRYING: NO, NEVER
I HAVE FELT SAD OR MISERABLE: NO, NOT AT ALL
I HAVE BEEN ANXIOUS OR WORRIED FOR NO GOOD REASON: NO, NOT AT ALL
THINGS HAVE BEEN GETTING ON TOP OF ME: NO, I HAVE BEEN COPING AS WELL AS EVER
I HAVE BLAMED MYSELF UNNECESSARILY WHEN THINGS WENT WRONG: NOT VERY OFTEN
I HAVE LOOKED FORWARD WITH ENJOYMENT TO THINGS: AS MUCH AS I EVER DID
TOTAL SCORE: 3
I HAVE FELT SCARED OR PANICKY FOR NO GOOD REASON: NO, NOT MUCH
I HAVE BEEN SO UNHAPPY THAT I HAVE HAD DIFFICULTY SLEEPING: NOT VERY OFTEN
I HAVE BEEN ABLE TO LAUGH AND SEE THE FUNNY SIDE OF THINGS: AS MUCH AS I ALWAYS COULD
THE THOUGHT OF HARMING MYSELF HAS OCCURRED TO ME: NEVER

## 2025-03-17 ENCOUNTER — PRENATAL OFFICE VISIT (OUTPATIENT)
Dept: OBGYN | Facility: CLINIC | Age: 45
End: 2025-03-17
Payer: COMMERCIAL

## 2025-03-17 ENCOUNTER — TELEPHONE (OUTPATIENT)
Dept: WOUND CARE | Facility: CLINIC | Age: 45
End: 2025-03-17

## 2025-03-17 VITALS — WEIGHT: 192.9 LBS | DIASTOLIC BLOOD PRESSURE: 82 MMHG | SYSTOLIC BLOOD PRESSURE: 114 MMHG | BODY MASS INDEX: 28.49 KG/M2

## 2025-03-17 DIAGNOSIS — Z86.32 HISTORY OF GESTATIONAL DIABETES: ICD-10-CM

## 2025-03-17 DIAGNOSIS — E03.9 ACQUIRED HYPOTHYROIDISM: ICD-10-CM

## 2025-03-17 PROCEDURE — 99207 PR POST PARTUM EXAM: CPT | Performed by: OBSTETRICS & GYNECOLOGY

## 2025-03-17 PROCEDURE — 3074F SYST BP LT 130 MM HG: CPT | Performed by: OBSTETRICS & GYNECOLOGY

## 2025-03-17 PROCEDURE — 3079F DIAST BP 80-89 MM HG: CPT | Performed by: OBSTETRICS & GYNECOLOGY

## 2025-03-17 PROCEDURE — 0503F POSTPARTUM CARE VISIT: CPT | Performed by: OBSTETRICS & GYNECOLOGY

## 2025-03-17 NOTE — TELEPHONE ENCOUNTER
Patient is asking for what to use or  from the store while waiting for her supply order. Requesting a nurse call back.

## 2025-03-17 NOTE — TELEPHONE ENCOUNTER
Returned call to Anahi and discussed that she can  a 4 sided bandage to use as a cover dressing while she is waiting for the Medipore Plus Pads to come in.  Does have [employer] Ag from her appointment here.

## 2025-03-17 NOTE — PROGRESS NOTES
SUBJECTIVE: Anahi is here for a 6-week postpartum checkup.    Delivery date was 1/31/25 . She had a Primary LTCS at 36w2d due to Hx myomectomy of a viable boy, weight 6 pounds 13.7 oz., with complication of superficial wound separation of left side of incision approx 3 cm long. She has since seen Wound Care clinic 3 days ago and is following the wound care program they have initiated.  Since delivery, she has not been breast feeding.  She has No signs of infection, bleeding or other complications.  She is not pregnant.  She also had postop Hgb 7.7. Also on Synthroid for hypothyroidism. Had GDM A1. We discussed contraceptions and she has chosen condoms.  She  has not had intercourse since delivery and complains of No discomfort. Patient screened for postpartum depression and complaints are NEGATIVE. Screening has also been completed for intimate partner violence.    EXAM:  Today's Depression Rating was     [place] Depression Scale  Thoughts of Harming Self: (Patient-Rptd) Never  Total Score: (Patient-Rptd) 3       4/3/2024     8:31 AM   PHQ-9 SCORE   PHQ-9 Total Score 0     /82 (BP Location: Right arm, Cuff Size: Adult Regular)   Wt 87.5 kg (192 lb 14.4 oz)   LMP  (LMP Unknown)   Breastfeeding No   BMI 28.49 kg/m    Abdomen- Abdomen soft, non-tender. BS normal. No masses, organomegaly, positive findings: obese  Incision - 3 cm area of left side of incision covered currently with dressing from Wound Care clinic  Pelvic- Exam chaperoned by nurse, External genitalia normal, Bartholin's glands normal, MacArthur's glands normal, Urethral meatus normal, Urethra normal, Bladder normal, Vagina with normal rugae, no abnormal lesions, no abnormal discharge, Normal cervix without lesions or mucopus, no cervical motion tenderness, Uterus normal size, shape, and contour, no masses, non-tender, Adnexa normal size without masses or tenderness bilaterally, Anus normal      ASSESSMENT:   Encounter Diagnoses   Name Primary?     Postpartum care following  delivery Yes    Separation of  wound with drainage, postpartum     Postpartum anemia     Acquired hypothyroidism     History of gestational diabetes          PLAN:  1) Cont wound care w/ Wound Care clinic until healed.  2) Next Pap/HPV due after 2026.  3) Check Hgb, TSH, and 2hr GTT. Follow-up w/ PCP for ongoing management of Synthroid if still required.  4) Return as needed or at time of next expected pap, pelvic, or breast exam.      Dann Yee MD

## 2025-03-20 ENCOUNTER — MEDICAL CORRESPONDENCE (OUTPATIENT)
Dept: HEALTH INFORMATION MANAGEMENT | Facility: CLINIC | Age: 45
End: 2025-03-20
Payer: COMMERCIAL

## 2025-03-20 ENCOUNTER — OFFICE VISIT (OUTPATIENT)
Dept: FAMILY MEDICINE | Facility: CLINIC | Age: 45
End: 2025-03-20

## 2025-03-20 VITALS
BODY MASS INDEX: 27.49 KG/M2 | TEMPERATURE: 98.2 F | SYSTOLIC BLOOD PRESSURE: 122 MMHG | HEIGHT: 70 IN | WEIGHT: 192 LBS | DIASTOLIC BLOOD PRESSURE: 78 MMHG | OXYGEN SATURATION: 99 % | HEART RATE: 87 BPM

## 2025-03-20 DIAGNOSIS — D50.8 OTHER IRON DEFICIENCY ANEMIA: ICD-10-CM

## 2025-03-20 DIAGNOSIS — Z86.32 HISTORY OF GESTATIONAL DIABETES: ICD-10-CM

## 2025-03-20 DIAGNOSIS — E03.9 ACQUIRED HYPOTHYROIDISM: Primary | ICD-10-CM

## 2025-03-20 LAB
ALBUMIN SERPL-MCNC: 4.5 G/DL (ref 3.6–5.1)
ALP SERPL-CCNC: 58 U/L (ref 33–130)
ALT 1742-6: 6 U/L (ref 0–32)
AST 1920-8: 14 U/L (ref 0–35)
BILIRUB SERPL-MCNC: 0.4 MG/DL (ref 0.2–1.2)
BUN SERPL-MCNC: 13 MG/DL (ref 7–25)
BUN/CREATININE RATIO: 17 (ref 6–32)
CALCIUM SERPL-MCNC: 9.6 MG/DL (ref 8.6–10.3)
CHLORIDE SERPLBLD-SCNC: 108 MMOL/L (ref 98–110)
CO2 SERPL-SCNC: 26.7 MMOL/L (ref 20–32)
CREAT SERPL-MCNC: 0.78 MG/DL (ref 0.6–1.3)
ERYTHROCYTE [DISTWIDTH] IN BLOOD BY AUTOMATED COUNT: 17.4 %
GLUCOSE SERPL-MCNC: 91 MG/DL (ref 60–99)
HCT VFR BLD AUTO: 39.2 % (ref 35–47)
HEMOGLOBIN A1C: 5.5 % (ref 4–5.6)
HEMOGLOBIN: 11.8 G/DL (ref 11.7–15.7)
MCH RBC QN AUTO: 25.9 PG (ref 26–33)
MCHC RBC AUTO-ENTMCNC: 30.1 G/DL (ref 31–36)
MCV RBC AUTO: 86.2 FL (ref 78–100)
PLATELET COUNT - QUEST: 403 10^9/L (ref 150–375)
POTASSIUM SERPL-SCNC: 5.54 MMOL/L (ref 3.5–5.3)
PROT SERPL-MCNC: 7.4 G/DL (ref 6.1–8.1)
RBC # BLD AUTO: 4.55 10*12/L (ref 3.8–5.2)
SODIUM SERPL-SCNC: 140 MMOL/L (ref 135–146)
WBC # BLD AUTO: 6.7 10*9/L (ref 4–11)

## 2025-03-20 PROCEDURE — G2211 COMPLEX E/M VISIT ADD ON: HCPCS

## 2025-03-20 PROCEDURE — 80053 COMPREHEN METABOLIC PANEL: CPT

## 2025-03-20 PROCEDURE — 83036 HEMOGLOBIN GLYCOSYLATED A1C: CPT

## 2025-03-20 PROCEDURE — 36415 COLL VENOUS BLD VENIPUNCTURE: CPT

## 2025-03-20 PROCEDURE — 85027 COMPLETE CBC AUTOMATED: CPT

## 2025-03-20 PROCEDURE — 99204 OFFICE O/P NEW MOD 45 MIN: CPT

## 2025-03-20 NOTE — PROGRESS NOTES
Assessment & Plan     1. Acquired hypothyroidism (Primary)  - Labs pending, patient will be notified of results on my chart. Discussed with Anahi if TSH level is normal, will have her stop taking Synthroid and schedule lab only visit in 8 weeks for lab only to recheck TSH level to ensure is still normal as she has still been taking Synthroid daily up until this point.   - VENOUS COLLECTION  - TSH ([facility])  - T4 FREE ([facility])    2. Other iron deficiency anemia  - Labs pending, patient will be notified of results on my chart.   - VENOUS COLLECTION  - Hemogram Platelet ([facility])  - FERRITIN ([facility])  - IRON AND IRON BINDING CAPACITY ([facility])    3. History of gestational diabetes  - Labs pending, patient will be notified of results on my chart.   - VENOUS COLLECTION  - Comprehensive Metobolic Panel ([facility])  - HEMOGLOBIN A1C ([facility])    Will follow up with lab results on my chart. Reasons to follow-up sooner or seek emergent care reviewed.     YANET HillC  [facility]       Subjective     Anahi Hannah is a 44 year old female who presents to clinic today for the following health issues:    HPI   Chief Complaint   Patient presents with    New Patient     New patient to our clinic    Recheck Medication     Recheck on thyroid, she started this during IVF, she wants to stop this       Anahi is a new patient to the clinic who presents to establish care today and for a consult on her thyroid. She was previously seeing a PCP regularly at [facility].    Anahi recently gave birth to her first child (baby boy) via  on 25 via . She states she did IVF and was started on Synthroid 50 mcg in late last summer/fall, notes she had a normal TSH but it was recommended her TSH be <2.5 so was started on thyroid medication to help lower this level to sustain her pregnancy as she was post-menopausal. Prior to this she never had any problems with her thyroid. Would like to recheck her thyroid level today  "to see if she can stop taking Synthroid.     Anahi also notes she had a lot of complications with her pregnancy including a post-op hemoglobin of 7.7, is due to recheck her hemoglobin levels again today. She did have an iron infusion done while in the hospital after her  and then another time in the hospital. She also had gestational diabetes and needs her glucose levels checked. She was not on insulin during her pregnancy, was monitoring with checking her blood sugars four times a day and focusing on her diet. Also has a complication of superficial wound separation of left side of incision approx 3 cm long that she is regularly seeing wound clinic for.    Medical history and daily medications reviewed.       Objective    /78 (BP Location: Right arm, Patient Position: Sitting, Cuff Size: Adult Large)   Pulse 87   Temp 98.2  F (36.8  C) (Temporal)   Ht 1.765 m (5' 9.5\")   Wt 87.1 kg (192 lb)   SpO2 99%   Breastfeeding No   BMI 27.95 kg/m    Body mass index is 27.95 kg/m .    Physical Examination:  GENERAL: healthy, alert and no distress  EYES: Eyes grossly normal to inspection, PERRL and conjunctivae and sclerae normal  HENT: mouth without ulcers or lesions  NECK: no adenopathy, no asymmetry, masses, or scars and thyroid normal to palpation  RESP: lungs clear to auscultation - no rales, rhonchi or wheezes  CV: regular rate and rhythm, normal S1 S2, no S3 or S4, no murmur, click or rub, no peripheral edema   ABDOMEN: soft and non-tender  MS: no gross musculoskeletal defects noted, no edema  SKIN: no suspicious lesions or rashes  PSYCH: mentation appears normal, affect normal/bright    Lab work pending.   "

## 2025-03-20 NOTE — NURSING NOTE
Chief Complaint   Patient presents with    New Patient     New patient to our clinic    Recheck Medication     Recheck on thyroid, she started this during IVF, she wants to stop this      Pre-visit Screening:  Immunizations:  up to date  Colonoscopy:  is up to date  Mammogram: is up to date  Asthma Action Test/Plan:  NA  PHQ9:  NA  GAD7:  NA  Questioned patient about current smoking habits Pt. has never smoked.  Ok to leave detailed message on voice mail for today's visit only Yes, phone # [phone]

## 2025-03-21 LAB
% SATURATION - QUEST: 18 % (CALC) (ref 16–45)
FERRITIN SERPL-MCNC: 13 NG/ML (ref 16–232)
IRON: 70 MCG/DL (ref 40–190)
T4, FREE, NON-DIALYSIS - QUEST: 1.4 NG/DL (ref 0.8–1.8)
TIBC - QUEST: 382 MCG/DL (CALC) (ref 250–450)
TSH SERPL-ACNC: 2.03 MIU/L

## 2025-04-07 ENCOUNTER — HOSPITAL ENCOUNTER (OUTPATIENT)
Dept: WOUND CARE | Facility: CLINIC | Age: 45
Discharge: HOME | End: 2025-04-07
Payer: COMMERCIAL

## 2025-04-07 VITALS — SYSTOLIC BLOOD PRESSURE: 127 MMHG | DIASTOLIC BLOOD PRESSURE: 86 MMHG | TEMPERATURE: 96.6 F | HEART RATE: 73 BPM

## 2025-04-07 DIAGNOSIS — L98.492 SKIN ULCER OF ABDOMEN WITH FAT LAYER EXPOSED (H): Primary | ICD-10-CM

## 2025-04-07 PROCEDURE — 17250 CHEM CAUT OF GRANLTJ TISSUE: CPT

## 2025-04-07 NOTE — DISCHARGE INSTRUCTIONS
04/07/2025   Anahi Hannah   1980    A DME order for supplies has been placed to [employer]. If there are any issues with your order including not receiving the order please call our clinic at [phone]. Do not call [employer]. We are better able to help you. We can contact the [employer] rep to better assist than if you call the general [employer] number. We can provide a tracking number also if needed.     [employer] is now sending an ancillary kit free of charge. This kit includes gauze, gloves, and saline. You will receive 1 kit per 15 days of the supply order. We typically order 30 days of supplies so you will receive 2 kits. Please let us know if you would not like to receive this kit and we can communicate this to [employer].    Dressing changes outside of clinic are being performed by Patient    Plan 04/07/2025   We applied Silver Nitrate to the hypergranulation tissue today.  This may lead to temporary staining of the skin with increased drainage and discolored gray/black drainage.  This may be present for a few days and is a normal process.  Okay to shower. Remove old dressing prior to showering and allow soapy water to run over the wound. Do not scrub  You can apply lotions/oils to the healed portion of your incision. Avoid the wound and where the dressing will be applied.  Okay to do light exercise like walking and yoga. Wear your abdominal binder when doing strength exercises  Do not submerge in water (tubs, pools, etc.)  If your wound heals before your next appointment, you can call to cancel     Wound Dressing Change: Lower Abdomen  - Wash your hands with soap and water before you begin your dressing change and prepare a clean surface for dressings.  - Cleanse with mild unscented soap and water (such as Cetaphil, Cerave or Dove)   - Lightly apply antifungal powder to any rashy areas around the incision  - Primary dressing: Apply 1/30 of a 4x5 Hydrofera Blue Ready Transfer cut to fit to the wound bed  -  Secondary dressing: Cover with a 2x2.375 Medipore Plus Pad  Change dressing Every other day and as needed for soilage/leakage    You do not need to change the dressing on the days you are being seen at the wound clinic    A diet high in protein is important for wound healing, we recommend getting 90 grams of protein per day. Taking protein shakes or bars are a good way to get extra protein in your diet.     Good sources of protein:  Pork 26g per 3 oz  Whey protein powder - 24g per scoop (on average)  Greek yogurt - 23g per 8oz   Chicken or Turkey - 23g per 3oz  Fish - 20-25g per 3oz  Beef - 18-23g per 3oz  Tofu - 10g per 1/2 cup  Navy beans - 20g per cup  Cottage cheese - 14g per 1/2 cup   Lentils - 13g per 1/4 cup  Beef jerky 13g per 1oz  2% milk - 8g per cup  Peanut butter - 8g per 2 tablespoons  Eggs - 6g per egg  Mixed nuts - 6g per 2oz       Main Provider: Alicia Jade NP April 7, 2025    Call us at [phone] if you have any questions about your wounds, if you have redness or swelling around your wound, have a fever of 101 degrees Fahrenheit or greater or if you have any other problems or concerns. We answer the phone Monday through Friday 8 am to 4 pm, please leave a message as we check the voicemail frequently throughout the day. If you have a concern over the weekend, please leave a message and we will return your call Monday. If the need is urgent, go to the ER or urgent care.    If you had a positive experience please indicate that on your patient satisfaction survey form that [facility] will be sending you.    It was a pleasure meeting with you today.  Thank you for allowing me and my team the privilege of caring for you today.  YOU are the reason we are here, and I truly hope we provided you with the excellent service you deserve.  Please let us know if there is anything else we can do for you so that we can be sure you are leaving completely satisfied with your care experience.      If you  have any billing related questions please call the [facility] Business office at [phone]. The clinic staff does not handle billing related matters.    If you are scheduled to have a follow up appointment, you will receive a reminder call the day before your visit. On the appointment day please arrive 15 minutes prior to your appointment time. If you are unable to keep that appointment, please call the clinic to cancel or reschedule. If you are more than 10 minutes late or greater for your scheduled appointment time, the clinic policy is that you may be asked to reschedule.

## 2025-04-07 NOTE — PROGRESS NOTES
[facility]    ASSESSMENT:   Separation of left aspect of  incision    PLAN/DISCUSSION:   Wound care plan: hydrofera blue ready with miconazole powder to periwound, cover with medipore. Dressing will be performed by patient See bottom of note for detailed wound care and patient instructions  Dietary recommendations discussed, see AVS   No submerging into water until healed, ok to shower.    Interval Hx:  2025: Wound hypergranular-applied silver nitrate, slight worsening in size.     HISTORY OF PRESENT ILLNESS:   Anahi Hannah is a 44 year old female who underwent a  in early February, who noted a separation of left side of incision approximately 2/15, with slight worsening. Denies fevers/chills. Denies purulent drainage. Reports some moisture in area.       Patient Active Problem List   Diagnosis    Gastroesophageal reflux disease without esophagitis    Female infertility    Uterine leiomyoma, unspecified location    History of colposcopy with cervical biopsy    High-tone pelvic floor dysfunction in female    Pelvic somatic dysfunction    Dyspareunia in female    Anxiety    Kidney stone    Migraine headache    Pap smear abnormality of cervix    DVT (deep venous thrombosis) (H)    Pregnancy with history of uterine myomectomy    DVT (deep vein thrombosis) in pregnancy    Pregnancy resulting from in vitro fertilization    Advanced maternal age, 1st pregnancy    Factor 5 Leiden mutation, heterozygous    Hypothyroidism affecting pregnancy    Family history of hypercoagulable state    Two vessel umbilical cord, antepartum    Marginal insertion of umbilical cord affecting management of mother    White classification A1 gestational diabetes mellitus (GDM), diet controlled    Other hydronephrosis    Anemia affecting first pregnancy    Hydronephrosis     delivery delivered    Placenta accreta affecting delivery    History of deep vein thrombosis (DVT) during pregnancy     Gestational diabetes mellitus (GDM), delivered, current hospitalization    Gestational diabetes mellitus, delivered, current hospitalization    Pregnancy resulting from in vitro fertilization in third trimester    Hypothyroidism affecting pregnancy in third trimester    Primigravida of advanced maternal age in third trimester    Surgical abdomen    Skin ulcer of abdomen with fat layer exposed (H)    Acquired hypothyroidism       Current Outpatient Medications   Medication Sig Dispense Refill    LANsoprazole (PREVACID) 30 MG DR capsule Take 1 capsule by mouth daily. 90 capsule 1    Multiple Vitamins-Minerals (HAIR SKIN & NAILS PO) Take by mouth.      nystatin (MYCOSTATIN) 441621 UNIT/GM external cream Apply topically 2 times daily. 15 g 0     No current facility-administered medications for this encounter.       VITALS: /86 (BP Location: Right arm, Patient Position: Sitting, Cuff Size: Adult Regular)   Pulse 73   Temp (!) 96.6  F (35.9  C) (Temporal)      PHYSICAL EXAM:  GENERAL: Patient is alert and oriented and in no acute distress  INTEGUMENTARY:   Wound (used by OP [facility] only) 03/14/25 0759 abdomen lower surgical (Active)   Thickness/Stage full thickness 04/07/25 0950   Base hypergranulation 04/07/25 0950   Periwound intact 04/07/25 0950   Periwound Temperature warm 04/07/25 0950   Periwound Skin Turgor soft 04/07/25 0950   Edges open 04/07/25 0950   Length (cm) 0.5 04/07/25 0950   Width (cm) 2.8 04/07/25 0950   Depth (cm) 0.3 04/07/25 0950   Wound (cm^2) 1.4 cm^2 04/07/25 0950   Wound Volume (cm^3) 0.42 cm^3 04/07/25 0950   Wound healing % -112.12 04/07/25 0950   Drainage Characteristics/Odor serosanguineous 04/07/25 0950   Drainage Amount moderate 04/07/25 0950   Care, Wound non-select wound debridement performed.;chemical cautery applied 04/07/25 0950           PROCEDURES: Chemical debridement was performed with silver nitrate.     MDM: 30 minutes were spent on the date of the visit reviewing previous  chart notes, evaluating patient and developing the treatment plan, this excludes any time spent on procedures    PATIENT INSTRUCTIONS      Further instructions from your care team         04/07/2025   Anahiemily Hannah   1980    A DME order for supplies has been placed to [employer]. If there are any issues with your order including not receiving the order please call our clinic at [phone]. Do not call Nick. We are better able to help you. We can contact the [employer] rep to better assist than if you call the general [employer] number. We can provide a tracking number also if needed.     [employer] is now sending an ancillary kit free of charge. This kit includes gauze, gloves, and saline. You will receive 1 kit per 15 days of the supply order. We typically order 30 days of supplies so you will receive 2 kits. Please let us know if you would not like to receive this kit and we can communicate this to Nick.    Dressing changes outside of clinic are being performed by Patient    Plan 04/07/2025   We applied Silver Nitrate to the hypergranulation tissue today.  This may lead to temporary staining of the skin with increased drainage and discolored gray/black drainage.  This may be present for a few days and is a normal process.  Okay to shower. Remove old dressing prior to showering and allow soapy water to run over the wound. Do not scrub  You can apply lotions/oils to the healed portion of your incision. Avoid the wound and where the dressing will be applied.  Okay to do light exercise like walking and yoga. Wear your abdominal binder when doing strength exercises  Do not submerge in water (tubs, pools, etc.)  If your wound heals before your next appointment, you can call to cancel     Wound Dressing Change: Lower Abdomen  - Wash your hands with soap and water before you begin your dressing change and prepare a clean surface for dressings.  - Cleanse with mild unscented soap and water (such as Cetaphil, Cerave or  Dove)   - Lightly apply antifungal powder to any rashy areas around the incision  - Primary dressing: Apply 1/30 of a 4x5 Hydrofera Blue Ready Transfer cut to fit to the wound bed  - Secondary dressing: Cover with a 2x2.375 Medipore Plus Pad  Change dressing Every other day and as needed for soilage/leakage    You do not need to change the dressing on the days you are being seen at the wound clinic    A diet high in protein is important for wound healing, we recommend getting 90 grams of protein per day. Taking protein shakes or bars are a good way to get extra protein in your diet.     Good sources of protein:  Pork 26g per 3 oz  Whey protein powder - 24g per scoop (on average)  Greek yogurt - 23g per 8oz   Chicken or Turkey - 23g per 3oz  Fish - 20-25g per 3oz  Beef - 18-23g per 3oz  Tofu - 10g per 1/2 cup  Navy beans - 20g per cup  Cottage cheese - 14g per 1/2 cup   Lentils - 13g per 1/4 cup  Beef jerky 13g per 1oz  2% milk - 8g per cup  Peanut butter - 8g per 2 tablespoons  Eggs - 6g per egg  Mixed nuts - 6g per 2oz       Main Provider: Alicia Jade NP April 7, 2025    Call us at [phone] if you have any questions about your wounds, if you have redness or swelling around your wound, have a fever of 101 degrees Fahrenheit or greater or if you have any other problems or concerns. We answer the phone Monday through Friday 8 am to 4 pm, please leave a message as we check the voicemail frequently throughout the day. If you have a concern over the weekend, please leave a message and we will return your call Monday. If the need is urgent, go to the ER or urgent care.    If you had a positive experience please indicate that on your patient satisfaction survey form that [facility] will be sending you.    It was a pleasure meeting with you today.  Thank you for allowing me and my team the privilege of caring for you today.  YOU are the reason we are here, and I truly hope we provided you with the excellent  service you deserve.  Please let us know if there is anything else we can do for you so that we can be sure you are leaving completely satisfied with your care experience.      If you have any billing related questions please call the [facility] Business office at [phone]. The clinic staff does not handle billing related matters.    If you are scheduled to have a follow up appointment, you will receive a reminder call the day before your visit. On the appointment day please arrive 15 minutes prior to your appointment time. If you are unable to keep that appointment, please call the clinic to cancel or reschedule. If you are more than 10 minutes late or greater for your scheduled appointment time, the clinic policy is that you may be asked to reschedule.          Electronically signed by Alicia Jade CNP on April 7, 2025

## 2025-04-18 ENCOUNTER — ANCILLARY PROCEDURE (OUTPATIENT)
Dept: MAMMOGRAPHY | Facility: CLINIC | Age: 45
End: 2025-04-18
Payer: COMMERCIAL

## 2025-04-18 DIAGNOSIS — Z12.31 VISIT FOR SCREENING MAMMOGRAM: ICD-10-CM

## 2025-04-18 PROCEDURE — 77067 SCR MAMMO BI INCL CAD: CPT | Mod: TC | Performed by: RADIOLOGY

## 2025-04-18 PROCEDURE — 77063 BREAST TOMOSYNTHESIS BI: CPT | Mod: TC | Performed by: RADIOLOGY

## 2025-05-05 ENCOUNTER — HOSPITAL ENCOUNTER (OUTPATIENT)
Dept: WOUND CARE | Facility: CLINIC | Age: 45
Discharge: HOME | End: 2025-05-05
Payer: COMMERCIAL

## 2025-05-05 VITALS — DIASTOLIC BLOOD PRESSURE: 79 MMHG | SYSTOLIC BLOOD PRESSURE: 132 MMHG | TEMPERATURE: 97 F | HEART RATE: 74 BPM

## 2025-05-05 DIAGNOSIS — L98.492 SKIN ULCER OF ABDOMEN WITH FAT LAYER EXPOSED (H): Primary | ICD-10-CM

## 2025-05-05 DIAGNOSIS — E03.9 ACQUIRED HYPOTHYROIDISM: ICD-10-CM

## 2025-05-05 PROCEDURE — 36415 COLL VENOUS BLD VENIPUNCTURE: CPT

## 2025-05-05 PROCEDURE — G0463 HOSPITAL OUTPT CLINIC VISIT: HCPCS

## 2025-05-05 NOTE — PROGRESS NOTES
[facility]    ASSESSMENT:   Separation of left aspect of  incision     PLAN/DISCUSSION:   Wound is healed, can apply silicone  No activity restrictions  Dietary recommendations discussed, see AVS       HISTORY OF PRESENT ILLNESS:   Anahi Hannah is a 44 year old female who underwent a  in early February, who noted a separation of left side of incision approximately 2/15, with slight worsening. Denies fevers/chills. Denies purulent drainage. Reports some moisture in area.       Patient Active Problem List   Diagnosis    Gastroesophageal reflux disease without esophagitis    Female infertility    Uterine leiomyoma, unspecified location    History of colposcopy with cervical biopsy    High-tone pelvic floor dysfunction in female    Pelvic somatic dysfunction    Dyspareunia in female    Anxiety    Kidney stone    Migraine headache    Pap smear abnormality of cervix    DVT (deep venous thrombosis) (H)    Pregnancy with history of uterine myomectomy    DVT (deep vein thrombosis) in pregnancy    Pregnancy resulting from in vitro fertilization    Advanced maternal age, 1st pregnancy    Factor 5 Leiden mutation, heterozygous    Hypothyroidism affecting pregnancy    Family history of hypercoagulable state    Two vessel umbilical cord, antepartum    Marginal insertion of umbilical cord affecting management of mother    White classification A1 gestational diabetes mellitus (GDM), diet controlled    Other hydronephrosis    Anemia affecting first pregnancy    Hydronephrosis     delivery delivered    Placenta accreta affecting delivery    History of deep vein thrombosis (DVT) during pregnancy    Gestational diabetes mellitus (GDM), delivered, current hospitalization    Gestational diabetes mellitus, delivered, current hospitalization    Pregnancy resulting from in vitro fertilization in third trimester    Hypothyroidism affecting pregnancy in third trimester    Primigravida of  advanced maternal age in third trimester    Surgical abdomen    Skin ulcer of abdomen with fat layer exposed (H)    Acquired hypothyroidism     Current Outpatient Medications   Medication Sig Dispense Refill    LANsoprazole (PREVACID) 30 MG DR capsule Take 1 capsule by mouth daily. 90 capsule 1    Multiple Vitamins-Minerals (HAIR SKIN & NAILS PO) Take by mouth.      nystatin (MYCOSTATIN) [id number] UNIT/GM external cream Apply topically 2 times daily. 15 g 0     No current facility-administered medications for this encounter.       VITALS: /79   Pulse 74   Temp 97  F (36.1  C) (Temporal)      PHYSICAL EXAM:  GENERAL: Patient is alert and oriented and in no acute distress  INTEGUMENTARY:   Wound (used by OP [facility] only) 03/14/25 0759 abdomen lower surgical (Active)   Thickness/Stage full thickness 05/05/25 0823   Base closed/resurfaced 05/05/25 0823   Periwound intact 05/05/25 0823   Periwound Temperature warm 05/05/25 0823   Periwound Skin Turgor soft 05/05/25 0823   Edges rolled/closed 05/05/25 0823   Length (cm) 0 05/05/25 0823   Width (cm) 0 05/05/25 0823   Depth (cm) 0 05/05/25 0823   Wound (cm^2) 0 cm^2 05/05/25 0823   Wound Volume (cm^3) 0 cm^3 05/05/25 0823   Wound healing % 100 05/05/25 0823   Drainage Characteristics/Odor serosanguineous 04/07/25 0950   Drainage Amount none 05/05/25 0823   Care, Wound non-select wound debridement performed.;chemical cautery applied 04/07/25 0950                 MDM: 15 minutes were spent on the date of the visit reviewing previous chart notes, evaluating patient and developing the treatment plan, this excludes any time spent on procedures    PATIENT INSTRUCTIONS      Further instructions from your care team         05/05/2025   Anahi Hannah   1980    Plan 05/05/2025   Today your wound is healed; no need to return here to clinic unless wound reopens; if needed, call clinic for appointment.  You can apply lotions/oils to the healed portion of your incision.    Incision will continue to heal and strengthen over next year or so during which time the scar will pale in color.            Main Provider: Alicia Jade NP May 5, 2025    Call us at [phone] if you have any questions about your wounds, if you have redness or swelling around your wound, have a fever of 101 degrees Fahrenheit or greater or if you have any other problems or concerns. We answer the phone Monday through Friday 8 am to 4 pm, please leave a message as we check the voicemail frequently throughout the day. If you have a concern over the weekend, please leave a message and we will return your call Monday. If the need is urgent, go to the ER or urgent care.    If you had a positive experience please indicate that on your patient satisfaction survey form that [facility] will be sending you.    It was a pleasure meeting with you today.  Thank you for allowing me and my team the privilege of caring for you today.  YOU are the reason we are here, and I truly hope we provided you with the excellent service you deserve.  Please let us know if there is anything else we can do for you so that we can be sure you are leaving completely satisfied with your care experience.      If you have any billing related questions please call the [facility] Business office at [phone]. The clinic staff does not handle billing related matters.    If you are scheduled to have a follow up appointment, you will receive a reminder call the day before your visit. On the appointment day please arrive 15 minutes prior to your appointment time. If you are unable to keep that appointment, please call the clinic to cancel or reschedule. If you are more than 10 minutes late or greater for your scheduled appointment time, the clinic policy is that you may be asked to reschedule.         Electronically signed by Alicia Jade CNP on May 5, 2025

## 2025-05-05 NOTE — PROGRESS NOTES
Patient arrived for wound care visit. Certified Wound Care Nurse time spent evaluating patient record, and completed a full evaluation; provided recommendation based on treatment plan. Reviewed discharge instructions, patient education, and discussed plan of care with appropriate medical team staff members and patient.

## 2025-05-05 NOTE — DISCHARGE INSTRUCTIONS
05/05/2025   Anahi Hannah   1980    Plan 05/05/2025   Today your wound is healed; no need to return here to clinic unless wound reopens; if needed, call clinic for appointment.  You can apply lotions/oils to the healed portion of your incision.   Incision will continue to heal and strengthen over next year or so during which time the scar will pale in color.            Main Provider: Alicia Jade NP May 5, 2025    Call us at [phone] if you have any questions about your wounds, if you have redness or swelling around your wound, have a fever of 101 degrees Fahrenheit or greater or if you have any other problems or concerns. We answer the phone Monday through Friday 8 am to 4 pm, please leave a message as we check the voicemail frequently throughout the day. If you have a concern over the weekend, please leave a message and we will return your call Monday. If the need is urgent, go to the ER or urgent care.    If you had a positive experience please indicate that on your patient satisfaction survey form that [facility] will be sending you.    It was a pleasure meeting with you today.  Thank you for allowing me and my team the privilege of caring for you today.  YOU are the reason we are here, and I truly hope we provided you with the excellent service you deserve.  Please let us know if there is anything else we can do for you so that we can be sure you are leaving completely satisfied with your care experience.      If you have any billing related questions please call the [facility] Business office at [phone]. The clinic staff does not handle billing related matters.    If you are scheduled to have a follow up appointment, you will receive a reminder call the day before your visit. On the appointment day please arrive 15 minutes prior to your appointment time. If you are unable to keep that appointment, please call the clinic to cancel or reschedule. If you are more than 10 minutes late or  greater for your scheduled appointment time, the clinic policy is that you may be asked to reschedule.

## 2025-05-06 LAB — TSH SERPL-ACNC: 2.53 MIU/L

## 2025-07-07 ENCOUNTER — OFFICE VISIT (OUTPATIENT)
Dept: VASCULAR SURGERY | Facility: CLINIC | Age: 45
End: 2025-07-07
Payer: COMMERCIAL

## 2025-07-07 DIAGNOSIS — I83.813 VARICOSE VEINS OF BILATERAL LOWER EXTREMITIES WITH PAIN: Primary | ICD-10-CM

## 2025-07-07 NOTE — PROGRESS NOTES
Vein Clinic Consultation Note    HPI:  Anahi Hannah is a 44 year old female who was seen today in consultation for right small saphenous vein disease with associated symptomatic varicose veins, left popliteal and femoral occlusive DVT in the setting of pregnancy treated with Lovenox but no follow-up imaging.  Currently not anticoagulated.    Patient presents with complaints of right lower extremity achiness, heaviness, throbbing, and itching. Patient's symptoms are worse when standing for long periods of time and sitting for long periods of time and improve with leg elevation and frequent breaks.    Patient has a history of varicose veins and DVT.    The patient has pursued conservative measures with compression stockings, leg elevation, dietary measures and exercise. The symptoms are occurring despite the use of medical grade compression stockings for more than 3 months.    ROS    PHH:    Past Medical History:   Diagnosis Date    Anemia in pregnancy      delivery delivered 2025    Crushing injury of ankle and foot     DVT (deep vein thrombosis) in pregnancy 2024    DVT (deep venous thrombosis) (H) 07/10/2024    Early menopause     Factor 5 Leiden mutation, heterozygous 2024    Fibroid uterus 2023    large fibroid removed at [place]    Gastroesophageal reflux disease without esophagitis     Gestational diabetes     History of colposcopy with cervical biopsy     pos high risk HPV    History of deep vein thrombosis (DVT) during pregnancy 2025    Pelvic floor dysfunction     Placenta accreta affecting delivery 2025    Pneumonia of right lower lobe due to infectious organism     PONV (postoperative nausea and vomiting)     Tension headache         Past Surgical History:   Procedure Laterality Date     SECTION N/A 2025    Procedure: PRIMARY  SECTION;  Surgeon: Dann Yee MD;  Location: [facility] L+D - Placental path confirmed accreta      SECTION N/A 2025    Procedure:  section;  Surgeon: Dann Yee MD;  Location: [facility] OR - Placental path confirmed accreta    GYNECOLOGIC CRYOSURGERY      for HPV    HIP SURGERY      Congenital hip dysplasia    HYSTEROSCOPY,DIAGNOSTIC  2023    At [place] - done just prior to Myomectomy    LAPAROSCOPIC MYOMECTOMY UTERUS  2023    At [place] - 10 cm myoma    WISDOM TOOTH EXTRACTION         ALLERGIES:  Fluconazole, Hydromorphone, Pcn [penicillins], Amoxicillin, Bacitra-neomycin-polymyxin-hc, Bacitracin-polymyxin b, Cephalosporins, Hydrocortisone, Neomycin-bacitracin zn-polymyx, Other drug allergy (see comments), Polymyxin b sulfate, Terconazole, Neomycin sulfate, and Rosemary oil    Allergies   Allergen Reactions    Fluconazole Hives    Hydromorphone Other (See Comments)     Auditory Hallucinations    Pcn [Penicillins] Hives     PEN-FAST - Penicillin Allergy Risk Tool completed by [facility] 2024  Score: 0  Risk: Very Low  Assessment: Patient has a < 1% chance of a positive penicillin allergy test    Amoxicillin Hives    Bacitra-Neomycin-Polymyxin-Hc     Bacitracin-Polymyxin B Hives    Cephalosporins Other (See Comments)     Pt thinks she might be  allergic to cephalosporins- but not certain?    Hydrocortisone Swelling    Neomycin-Bacitracin Zn-Polymyx     Other Drug Allergy (See Comments)     Polymyxin B Sulfate Hives    Terconazole     Neomycin Sulfate Anxiety, Hives, Itching, Palpitations and Rash    Rosemary Oil Anxiety, GI Disturbance, Hives, Itching, Rash and Nausea       MEDS:    Current Outpatient Medications:     LANsoprazole (PREVACID) 30 MG DR capsule, Take 1 capsule by mouth daily., Disp: 90 capsule, Rfl: 1    Multiple Vitamins-Minerals (HAIR SKIN & NAILS PO), Take by mouth., Disp: , Rfl:     nystatin (MYCOSTATIN) [id number] UNIT/GM external cream, Apply topically 2 times daily., Disp: 15 g, Rfl: 0    SOCIAL HABITS:    History   Smoking Status    Never   Smokeless Tobacco    Never      Social History    Substance and Sexual Activity      Alcohol use: No      History   Drug Use No       FAMILY HISTORY:    Family History   Problem Relation Age of Onset    Diabetes Type 2  Mother         Diagnosed at age 40    Heart Disease Mother     Hypertension Mother     Hyperlipidemia Mother     Arthritis Father     Arthritis Brother     Asthma Brother     Depression Maternal Grandmother     Heart Disease Maternal Grandmother     Hypertension Maternal Grandmother     Hyperlipidemia Maternal Grandmother     Diabetes Type 2  Maternal Grandmother     Depression Maternal Grandfather     Heart Disease Maternal Grandfather     Hypertension Maternal Grandfather     Hyperlipidemia Maternal Grandfather     Diabetes Type 2  Maternal Grandfather     Cancer Paternal Grandfather     No Known Problems Son     Factor V Leiden deficiency Maternal Cousin     Breast Cancer No family hx of     Colon Cancer No family hx of     Cerebrovascular Disease No family hx of     Ovarian Cancer No family hx of          Results for orders placed or performed in visit on 04/18/25   MA Screening Bilateral w/ [place]    Narrative    BILATERAL FULL FIELD DIGITAL SCREENING MAMMOGRAM WITH TOMOSYNTHESIS    Performed on: 4/18/25    Compared to: 04/16/2024, 03/03/2023, and 02/10/2022    Technique:  This study was evaluated with the assistance of Computer-Aided   Detection.  Breast Tomosynthesis was used in interpretation.    Findings: There are scattered areas of fibroglandular density.  There is   no radiographic evidence of malignancy.     Impression    IMPRESSION: ACR BI-RADS Category 1: Negative    BREAST CANCER SCREENING RECOMMENDATION: Routine yearly mammography   beginning at age 40 or as discussed with your provider.    The results and recommendations of this examination will be communicated   to the patient.        Kim Burt MD         VEIN CLINIC LEG DRAWING    Side:: Left    VCSS  PAIN:: 0  Varicose Veins:: 1  Venous Edema::  1  Skin Pigmentation:: 0  Inflamation:: 0  Induration:: 0  Number of active ulcers:: 0  Active ulcer duration:: 0  Active ulcer diameter:: 0  Compression Therapy:: 0  VCSS Score:: 2    CEAP:  CEAP:: C3         ASSESSMENT: Symptomatic right calf varicose veins from small saphenous vein source.  Update venous competency testing.  History of left DVT that was occlusive up to the level of proximal femoral vein.    PLAN: Repeat venous competency testing and follow-up with me.  Will do bilateral given her symptomatic right side and her previous severe occlusive left DVT that did not have follow-up imaging to confirm resolution and assess competency of her deep vein system.      Grover Cheek MD    30-minute spent today reviewing chart, discussing with patient, coordinating care and postvisit charting.    The longitudinal plan of care for the diagnosis(es)/condition(s) as documented were addressed during this visit. Due to the added complexity in care, I will continue to support Anahi in the subsequent management and with ongoing continuity of care.

## 2025-07-07 NOTE — LETTER
2025      Anahi Hannah  [address]  [facility] [address]      Dear Colleague,    Thank you for referring your patient, Anahi Hannah, to the [facility] [place]. Please see a copy of my visit note below.        Vein Clinic Consultation Note    HPI:  Anahi Hannah is a 44 year old female who was seen today in consultation for right small saphenous vein disease with associated symptomatic varicose veins, left popliteal and femoral occlusive DVT in the setting of pregnancy treated with Lovenox but no follow-up imaging.  Currently not anticoagulated.    Patient presents with complaints of right lower extremity achiness, heaviness, throbbing, and itching. Patient's symptoms are worse when standing for long periods of time and sitting for long periods of time and improve with leg elevation and frequent breaks.    Patient has a history of varicose veins and DVT.    The patient has pursued conservative measures with compression stockings, leg elevation, dietary measures and exercise. The symptoms are occurring despite the use of medical grade compression stockings for more than 3 months.    ROS    PHH:    Past Medical History:   Diagnosis Date     Anemia in pregnancy       delivery delivered 2025     Crushing injury of ankle and foot      DVT (deep vein thrombosis) in pregnancy 2024     DVT (deep venous thrombosis) (H) 07/10/2024     Early menopause      Factor 5 Leiden mutation, heterozygous 2024     Fibroid uterus 2023    large fibroid removed at [place]     Gastroesophageal reflux disease without esophagitis      Gestational diabetes      History of colposcopy with cervical biopsy     pos high risk HPV     History of deep vein thrombosis (DVT) during pregnancy 2025     Pelvic floor dysfunction      Placenta accreta affecting delivery 2025     Pneumonia of right lower lobe due to infectious organism      PONV (postoperative nausea  and vomiting)      Tension headache         Past Surgical History:   Procedure Laterality Date      SECTION N/A 2025    Procedure: PRIMARY  SECTION;  Surgeon: Dann Yee MD;  Location: [facility] L+D - Placental path confirmed accreta      SECTION N/A 2025    Procedure:  section;  Surgeon: Dann Yee MD;  Location: [facility] OR - Placental path confirmed accreta     GYNECOLOGIC CRYOSURGERY      for HPV     HIP SURGERY      Congenital hip dysplasia     HYSTEROSCOPY,DIAGNOSTIC  2023    At [place] - done just prior to Myomectomy     LAPAROSCOPIC MYOMECTOMY UTERUS  2023    At [place] - 10 cm myoma     WISDOM TOOTH EXTRACTION         ALLERGIES:  Fluconazole, Hydromorphone, Pcn [penicillins], Amoxicillin, Bacitra-neomycin-polymyxin-hc, Bacitracin-polymyxin b, Cephalosporins, Hydrocortisone, Neomycin-bacitracin zn-polymyx, Other drug allergy (see comments), Polymyxin b sulfate, Terconazole, Neomycin sulfate, and Rosemary oil    Allergies   Allergen Reactions     Fluconazole Hives     Hydromorphone Other (See Comments)     Auditory Hallucinations     Pcn [Penicillins] Hives     PEN-FAST - Penicillin Allergy Risk Tool completed by [facility] 2024  Score: 0  Risk: Very Low  Assessment: Patient has a < 1% chance of a positive penicillin allergy test     Amoxicillin Hives     Bacitra-Neomycin-Polymyxin-Hc      Bacitracin-Polymyxin B Hives     Cephalosporins Other (See Comments)     Pt thinks she might be  allergic to cephalosporins- but not certain?     Hydrocortisone Swelling     Neomycin-Bacitracin Zn-Polymyx      Other Drug Allergy (See Comments)      Polymyxin B Sulfate Hives     Terconazole      Neomycin Sulfate Anxiety, Hives, Itching, Palpitations and Rash     Rosemary Oil Anxiety, GI Disturbance, Hives, Itching, Rash and Nausea       MEDS:    Current Outpatient Medications:      LANsoprazole (PREVACID) 30 MG DR capsule, Take 1 capsule by mouth daily., Disp: 90  capsule, Rfl: 1     Multiple Vitamins-Minerals (HAIR SKIN & NAILS PO), Take by mouth., Disp: , Rfl:      nystatin (MYCOSTATIN) 508487 UNIT/GM external cream, Apply topically 2 times daily., Disp: 15 g, Rfl: 0    SOCIAL HABITS:    History   Smoking Status     Never   Smokeless Tobacco     Never     Social History    Substance and Sexual Activity      Alcohol use: No      History   Drug Use No       FAMILY HISTORY:    Family History   Problem Relation Age of Onset     Diabetes Type 2  Mother         Diagnosed at age 40     Heart Disease Mother      Hypertension Mother      Hyperlipidemia Mother      Arthritis Father      Arthritis Brother      Asthma Brother      Depression Maternal Grandmother      Heart Disease Maternal Grandmother      Hypertension Maternal Grandmother      Hyperlipidemia Maternal Grandmother      Diabetes Type 2  Maternal Grandmother      Depression Maternal Grandfather      Heart Disease Maternal Grandfather      Hypertension Maternal Grandfather      Hyperlipidemia Maternal Grandfather      Diabetes Type 2  Maternal Grandfather      Cancer Paternal Grandfather      No Known Problems Son      Factor V Leiden deficiency Maternal Cousin      Breast Cancer No family hx of      Colon Cancer No family hx of      Cerebrovascular Disease No family hx of      Ovarian Cancer No family hx of          Results for orders placed or performed in visit on 04/18/25   MA Screening Bilateral w/ [place]    Narrative    BILATERAL FULL FIELD DIGITAL SCREENING MAMMOGRAM WITH TOMOSYNTHESIS    Performed on: 4/18/25    Compared to: 04/16/2024, 03/03/2023, and 02/10/2022    Technique:  This study was evaluated with the assistance of Computer-Aided   Detection.  Breast Tomosynthesis was used in interpretation.    Findings: There are scattered areas of fibroglandular density.  There is   no radiographic evidence of malignancy.     Impression    IMPRESSION: ACR BI-RADS Category 1: Negative    BREAST CANCER SCREENING  RECOMMENDATION: Routine yearly mammography   beginning at age 40 or as discussed with your provider.    The results and recommendations of this examination will be communicated   to the patient.        Kim Burt MD         VEIN CLINIC LEG DRAWING    Side:: Left    VCSS  PAIN:: 0  Varicose Veins:: 1  Venous Edema:: 1  Skin Pigmentation:: 0  Inflamation:: 0  Induration:: 0  Number of active ulcers:: 0  Active ulcer duration:: 0  Active ulcer diameter:: 0  Compression Therapy:: 0  VCSS Score:: 2    CEAP:  CEAP:: C3         ASSESSMENT: Symptomatic right calf varicose veins from small saphenous vein source.  Update venous competency testing.  History of left DVT that was occlusive up to the level of proximal femoral vein.    PLAN: Repeat venous competency testing and follow-up with me.  Will do bilateral given her symptomatic right side and her previous severe occlusive left DVT that did not have follow-up imaging to confirm resolution and assess competency of her deep vein system.      Grover Cheek MD    30-minute spent today reviewing chart, discussing with patient, coordinating care and postvisit charting.    The longitudinal plan of care for the diagnosis(es)/condition(s) as documented were addressed during this visit. Due to the added complexity in care, I will continue to support Anahi in the subsequent management and with ongoing continuity of care.      Again, thank you for allowing me to participate in the care of your patient.        Sincerely,        Grover Cheek MD    Electronically signed

## 2025-07-12 ENCOUNTER — APPOINTMENT (OUTPATIENT)
Dept: ULTRASOUND IMAGING | Facility: CLINIC | Age: 45
End: 2025-07-12
Attending: STUDENT IN AN ORGANIZED HEALTH CARE EDUCATION/TRAINING PROGRAM
Payer: COMMERCIAL

## 2025-07-12 ENCOUNTER — HOSPITAL ENCOUNTER (EMERGENCY)
Facility: CLINIC | Age: 45
Discharge: HOME | End: 2025-07-12
Attending: EMERGENCY MEDICINE | Admitting: EMERGENCY MEDICINE
Payer: COMMERCIAL

## 2025-07-12 VITALS
OXYGEN SATURATION: 95 % | BODY MASS INDEX: 27.95 KG/M2 | RESPIRATION RATE: 16 BRPM | HEART RATE: 88 BPM | SYSTOLIC BLOOD PRESSURE: 140 MMHG | DIASTOLIC BLOOD PRESSURE: 86 MMHG | HEIGHT: 70 IN | TEMPERATURE: 97 F

## 2025-07-12 DIAGNOSIS — M79.662 PAIN OF LEFT LOWER LEG: ICD-10-CM

## 2025-07-12 PROCEDURE — 99284 EMERGENCY DEPT VISIT MOD MDM: CPT | Mod: 25

## 2025-07-12 PROCEDURE — 93971 EXTREMITY STUDY: CPT | Mod: LT

## 2025-07-12 ASSESSMENT — ACTIVITIES OF DAILY LIVING (ADL)
ADLS_ACUITY_SCORE: 54
ADLS_ACUITY_SCORE: 54

## 2025-07-13 NOTE — ED PROVIDER NOTES
"  Emergency Department Note      History of Present Illness     Chief Complaint   Leg Pain      HPI   Anahi Hannah is a 44 year old female with a history of gestational diabetes, DVT, and anemia who presents to the ED for leg pain. The patient reports that she noticed left leg pain this morning and thought that she should come in to the ED due to her history of DVT in the left leg a year ago. The patient was on anticoagulants but has since stopped. The patient used tylenol this morning for a headache but has noticed no leg relief. The patient denies CP, shortness of breath, lower leg edema, and pain with deep breath.     Independent Historian   None    Review of External Notes   I reviewed her vascular surgery consult from 5 days ago for varicose veins in the setting of previous left popliteal and femoral occlusive DVT during her pregnancy.    Past Medical History     Medical History and Problem List   Anemia in pregnancy    delivery delivered   Crushing injury of ankle and foot   DVT (deep vein thrombosis) in pregnancy   Early menopause  Factor 5 Leiden mutation, heterozygous   Fibroid uterus  Gastroesophageal reflux disease without esophagitis   Gestational diabetes   History of colposcopy with cervical biopsy   Pelvic floor dysfunction   Placenta accreta affecting delivery   Pneumonia of right lower lobe due to infectious organism   PONV (postoperative nausea and vomiting)   Tension headache     Medications   Lansoprazole    Surgical History    section  Gynecologic cryosurgery  Hip surgery  Hysteroscopy  Laparoscopic myomectomy   [place] tooth extraction    Physical Exam     Patient Vitals for the past 24 hrs:   BP Temp Temp src Pulse Resp SpO2 Height   25 1920 (!) 140/86 97  F (36.1  C) Temporal 88 16 95 % 1.765 m (5' 9.5\")     Physical Exam  Constitutional: Vital signs reviewed as above  General: Alert  HEENT: Moist mucous membranes  Eyes: Conjunctiva normal.   Neck: Normal range of " motion  Cardiovascular: Regular rate, Regular rhythm and normal heart sounds.  No MRG  Pulmonary/Chest: Effort normal and breath sounds normal. No respiratory distress. Patient has no wheezes. Patient has no rales.   Abdominal: Soft. Positive bowel sounds. No MRG.  Musculoskeletal/Extremities: Full ROM. No calf swelling or tenderness.  Palpable left dorsalis pedis pulse  Endo: No pitting edema  Neurological: Alert, no focal deficits.  Skin: Skin is warm and dry.   Psychiatric: Pleasant     Diagnostics     Lab Results   Labs Ordered and Resulted from Time of ED Arrival to Time of ED Departure - No data to display    Imaging   US Lower Extremity Venous Duplex Left   Final Result   IMPRESSION:   1.  No deep venous thrombosis in the left lower extremity.        Independent Interpretation   None    ED Course      Medications Administered   Medications - No data to display    Procedures   Procedures     Discussion of Management   None    ED Course   ED Course as of 07/13/25 0115   Sat Jul 12, 2025 2114 I obtained history and examined the patient as noted above.        Additional Documentation  None    Medical Decision Making / Diagnosis     CMS Diagnoses: None    MIPS   None         MDM   Anahi Hannah is a 44 year old female with a history of DVT in pregnancy approximately 1 year ago in the left leg and now presents with pain in the thigh.  She is concerned about the possibility of a DVT.  There is no injuries.  There is no chest pain or shortness of breath.  There is no increased left calf swelling.  There is no pain to the right leg.  She has good distal pulse.  Fortunately ultrasound here was negative.  She will monitor her symptoms closely.  If symptoms not improved in 1 week's time she should have a repeat ultrasound.  If they progress rapidly she should come back sooner.    Disposition   The patient was discharged.     Diagnosis     ICD-10-CM    1. Pain of left lower leg  M79.662            Discharge  Medications   Discharge Medication List as of 7/12/2025  9:15 PM            Scribe Disclosure:  I, Khurram Ramirez, am serving as a scribe at 10:12 PM on 7/12/2025 to document services personally performed by No att. providers found based on my observations and the provider's statements to me.        Que Del Cid MD  07/13/25 0115

## 2025-07-13 NOTE — ED TRIAGE NOTES
Patient states 5 months post partum.  Had a massive DVT to left leg during pregnancy.  Off blood thinners since March 15.  Now having similar left leg pain; pain from upper thigh to knee.     Triage Assessment (Adult)       Row Name 07/12/25 1917          Triage Assessment    Airway WDL WDL        Respiratory WDL    Respiratory WDL WDL        Cardiac WDL    Cardiac WDL WDL        Cognitive/Neuro/Behavioral WDL    Cognitive/Neuro/Behavioral WDL WDL

## 2025-08-04 ENCOUNTER — VIRTUAL VISIT (OUTPATIENT)
Dept: VASCULAR SURGERY | Facility: CLINIC | Age: 45
End: 2025-08-04
Attending: INTERNAL MEDICINE
Payer: COMMERCIAL

## 2025-08-04 DIAGNOSIS — I83.91 VARICOSE VEINS OF RIGHT LOWER EXTREMITY, UNSPECIFIED WHETHER COMPLICATED: Primary | ICD-10-CM

## 2025-09-04 DIAGNOSIS — I83.91 VARICOSE VEINS OF RIGHT LOWER EXTREMITY, UNSPECIFIED WHETHER COMPLICATED: Primary | ICD-10-CM

## (undated) DEVICE — SOL WATER IRRIG 1000ML BOTTLE 2F7114

## (undated) DEVICE — SUCTION KIWI VAC  VAC-6000M

## (undated) DEVICE — GLOVE BIOGEL PI MICRO INDICATOR UNDERGLOVE SZ 6.5 48965

## (undated) DEVICE — SU VICRYL 2-0 CT-1 27" UND J259H

## (undated) DEVICE — SU VICRYL 4-0 PS-2 27" UND J426H

## (undated) DEVICE — LINEN DRAPE 54X72" 5467

## (undated) DEVICE — TRANSFER DEVICE BLOOD NDL HOLDER 364880

## (undated) DEVICE — GLOVE PROTEXIS MICRO 6.5  2D73PM65

## (undated) DEVICE — CAP BABY PINK/BLUE IC-2

## (undated) DEVICE — SU VICRYL 4-0 PS-2 18" UND J496H

## (undated) DEVICE — DRSG STERI STRIP 1/2X4" R1547

## (undated) DEVICE — PREP POVIDONE IODINE SOLUTION 10% 4OZ BOTTLE 29906-004

## (undated) DEVICE — GLOVE BIOGEL PI MICRO SZ 6.5 48565

## (undated) DEVICE — SU PDS II 0 CT 36" Z358T

## (undated) DEVICE — PREP DURAPREP 26ML APL 8630

## (undated) DEVICE — STOCKING SLEEVE VASOPRESS COMPRESSION CALF MED 18" VP501M

## (undated) DEVICE — PACK C-SECTION LF PL15OTA83B

## (undated) DEVICE — BAG CLEAR TRASH 1.3M 39X33" P4040C

## (undated) DEVICE — DRSG GAUZE 4X8"

## (undated) DEVICE — SU MONOCRYL 2-0 CT-1 36" UND Y945H

## (undated) DEVICE — STOCKING SLEEVE VASOPRESS COMPRESSION CALF MED VP501M

## (undated) DEVICE — BLADE CLIPPER SGL USE 9680

## (undated) DEVICE — DRSG ADAPTIC 3X8" 6113

## (undated) DEVICE — GLOVE PROTEXIS BLUE W/NEU-THERA 6.5  2D73EB65

## (undated) DEVICE — SU VICRYL 2-0 CT 36" J957H

## (undated) DEVICE — ESU GROUND PAD ADULT W/CORD E7507

## (undated) DEVICE — LINEN TOWEL PACK X10 5473

## (undated) DEVICE — LINEN HALF SHEET 5512

## (undated) DEVICE — CATH TRAY FOLEY SURESTEP 16FR DRAIN BAG STATOCK A899916

## (undated) DEVICE — GLOVE BIOGEL PI MICRO SZ 6.0 48560

## (undated) DEVICE — SYR TRANSFER DEVICE BLOOD NDL HOLDER 3648800

## (undated) DEVICE — SU CHROMIC 1 CT-1 36" 925H

## (undated) DEVICE — Device

## (undated) DEVICE — SUTURE MONOCRYL+ 2-0 CT-1 36" UNDYED MCP945H

## (undated) DEVICE — LINEN FULL SHEET 5511

## (undated) DEVICE — PAD CHUX UNDERPAD 30X36" P3036C

## (undated) DEVICE — TRAY PREP DRY SKIN SCRUB 067

## (undated) DEVICE — SOLIDIFIER FLUID RED 1500ML LIQUID KIT SYS POWDER ISOB1500

## (undated) DEVICE — DRSG GAUZE 4X4" 8044

## (undated) DEVICE — CATH TRAY FOLEY 16FR SILICONE 907416

## (undated) DEVICE — SOL NACL 0.9% IRRIG 1000ML BOTTLE 2F7124

## (undated) DEVICE — LINEN BABY BLANKET 5434

## (undated) DEVICE — ESU PENCIL SMOKE EVAC W/ROCKER SWITCH 0703-047-000

## (undated) RX ORDER — ONDANSETRON 2 MG/ML
INJECTION INTRAMUSCULAR; INTRAVENOUS
Status: DISPENSED
Start: 2025-01-31

## (undated) RX ORDER — OXYTOCIN/0.9 % SODIUM CHLORIDE 30/500 ML
PLASTIC BAG, INJECTION (ML) INTRAVENOUS
Status: DISPENSED
Start: 2025-01-31

## (undated) RX ORDER — FENTANYL CITRATE-0.9 % NACL/PF 10 MCG/ML
PLASTIC BAG, INJECTION (ML) INTRAVENOUS
Status: DISPENSED
Start: 2025-01-31

## (undated) RX ORDER — MORPHINE SULFATE 1 MG/ML
INJECTION, SOLUTION EPIDURAL; INTRATHECAL; INTRAVENOUS
Status: DISPENSED
Start: 2025-01-31

## (undated) RX ORDER — PROPOFOL 10 MG/ML
INJECTION, EMULSION INTRAVENOUS
Status: DISPENSED
Start: 2025-01-31

## (undated) RX ORDER — LIDOCAINE HYDROCHLORIDE 10 MG/ML
INJECTION, SOLUTION EPIDURAL; INFILTRATION; INTRACAUDAL; PERINEURAL
Status: DISPENSED
Start: 2025-01-31